# Patient Record
Sex: FEMALE | Race: WHITE | NOT HISPANIC OR LATINO | Employment: FULL TIME | URBAN - METROPOLITAN AREA
[De-identification: names, ages, dates, MRNs, and addresses within clinical notes are randomized per-mention and may not be internally consistent; named-entity substitution may affect disease eponyms.]

---

## 2017-07-03 ENCOUNTER — LAB VISIT (OUTPATIENT)
Dept: LAB | Facility: HOSPITAL | Age: 22
End: 2017-07-03
Payer: COMMERCIAL

## 2017-07-03 ENCOUNTER — OFFICE VISIT (OUTPATIENT)
Dept: INTERNAL MEDICINE | Facility: CLINIC | Age: 22
End: 2017-07-03
Payer: COMMERCIAL

## 2017-07-03 VITALS
SYSTOLIC BLOOD PRESSURE: 101 MMHG | WEIGHT: 144.81 LBS | BODY MASS INDEX: 20.73 KG/M2 | HEART RATE: 74 BPM | HEIGHT: 70 IN | DIASTOLIC BLOOD PRESSURE: 82 MMHG

## 2017-07-03 DIAGNOSIS — M25.541 ARTHRALGIA OF BOTH HANDS: ICD-10-CM

## 2017-07-03 DIAGNOSIS — J45.20 MILD INTERMITTENT ASTHMA WITHOUT COMPLICATION: Primary | ICD-10-CM

## 2017-07-03 DIAGNOSIS — Z00.00 ANNUAL PHYSICAL EXAM: ICD-10-CM

## 2017-07-03 DIAGNOSIS — M25.542 ARTHRALGIA OF BOTH HANDS: ICD-10-CM

## 2017-07-03 DIAGNOSIS — R53.82 CHRONIC FATIGUE: ICD-10-CM

## 2017-07-03 DIAGNOSIS — R81 GLUCOSURIA: ICD-10-CM

## 2017-07-03 LAB
ANION GAP SERPL CALC-SCNC: 7 MMOL/L
BACTERIA #/AREA URNS AUTO: NORMAL /HPF
BASOPHILS # BLD AUTO: 0.02 K/UL
BASOPHILS NFR BLD: 0.3 %
BILIRUB UR QL STRIP: NEGATIVE
BUN SERPL-MCNC: 14 MG/DL
CALCIUM SERPL-MCNC: 9.1 MG/DL
CHLORIDE SERPL-SCNC: 104 MMOL/L
CLARITY UR REFRACT.AUTO: CLEAR
CO2 SERPL-SCNC: 28 MMOL/L
COLOR UR AUTO: YELLOW
CREAT SERPL-MCNC: 0.9 MG/DL
DIFFERENTIAL METHOD: ABNORMAL
EOSINOPHIL # BLD AUTO: 0.1 K/UL
EOSINOPHIL NFR BLD: 1.4 %
ERYTHROCYTE [DISTWIDTH] IN BLOOD BY AUTOMATED COUNT: 12.5 %
EST. GFR  (AFRICAN AMERICAN): >60 ML/MIN/1.73 M^2
EST. GFR  (NON AFRICAN AMERICAN): >60 ML/MIN/1.73 M^2
GLUCOSE SERPL-MCNC: 77 MG/DL (ref 70–110)
GLUCOSE SERPL-MCNC: 82 MG/DL
GLUCOSE UR QL STRIP: NEGATIVE
HCT VFR BLD AUTO: 40.6 %
HGB BLD-MCNC: 13.9 G/DL
HGB UR QL STRIP: NEGATIVE
KETONES UR QL STRIP: NEGATIVE
LEUKOCYTE ESTERASE UR QL STRIP: NEGATIVE
LYMPHOCYTES # BLD AUTO: 2.3 K/UL
LYMPHOCYTES NFR BLD: 35.9 %
MCH RBC QN AUTO: 31 PG
MCHC RBC AUTO-ENTMCNC: 34.2 %
MCV RBC AUTO: 90 FL
MICROSCOPIC COMMENT: NORMAL
MONOCYTES # BLD AUTO: 0.6 K/UL
MONOCYTES NFR BLD: 8.8 %
NEUTROPHILS # BLD AUTO: 3.3 K/UL
NEUTROPHILS NFR BLD: 53.4 %
NITRITE UR QL STRIP: NEGATIVE
PH UR STRIP: 6 [PH] (ref 5–8)
PLATELET # BLD AUTO: 212 K/UL
PMV BLD AUTO: 9.1 FL
POTASSIUM SERPL-SCNC: 4.1 MMOL/L
PROT UR QL STRIP: NEGATIVE
RBC # BLD AUTO: 4.49 M/UL
RBC #/AREA URNS AUTO: 0 /HPF (ref 0–4)
SODIUM SERPL-SCNC: 139 MMOL/L
SP GR UR STRIP: 1.02 (ref 1–1.03)
SQUAMOUS #/AREA URNS AUTO: 1 /HPF
TSH SERPL DL<=0.005 MIU/L-ACNC: 0.81 UIU/ML
URN SPEC COLLECT METH UR: NORMAL
UROBILINOGEN UR STRIP-ACNC: NEGATIVE EU/DL
WBC # BLD AUTO: 6.26 K/UL
WBC #/AREA URNS AUTO: 1 /HPF (ref 0–5)

## 2017-07-03 PROCEDURE — 86592 SYPHILIS TEST NON-TREP QUAL: CPT

## 2017-07-03 PROCEDURE — 86703 HIV-1/HIV-2 1 RESULT ANTBDY: CPT

## 2017-07-03 PROCEDURE — 99999 PR PBB SHADOW E&M-NEW PATIENT-LVL III: CPT | Mod: PBBFAC,,, | Performed by: HOSPITALIST

## 2017-07-03 PROCEDURE — 36415 COLL VENOUS BLD VENIPUNCTURE: CPT

## 2017-07-03 PROCEDURE — 86038 ANTINUCLEAR ANTIBODIES: CPT

## 2017-07-03 PROCEDURE — 86431 RHEUMATOID FACTOR QUANT: CPT

## 2017-07-03 PROCEDURE — 99203 OFFICE O/P NEW LOW 30 MIN: CPT | Mod: S$GLB,,, | Performed by: HOSPITALIST

## 2017-07-03 PROCEDURE — 85025 COMPLETE CBC W/AUTO DIFF WBC: CPT

## 2017-07-03 PROCEDURE — 80048 BASIC METABOLIC PNL TOTAL CA: CPT

## 2017-07-03 PROCEDURE — 82948 REAGENT STRIP/BLOOD GLUCOSE: CPT | Mod: S$GLB,,, | Performed by: HOSPITALIST

## 2017-07-03 PROCEDURE — 84443 ASSAY THYROID STIM HORMONE: CPT

## 2017-07-03 RX ORDER — FLUTICASONE PROPIONATE 110 UG/1
1 AEROSOL, METERED RESPIRATORY (INHALATION) 2 TIMES DAILY
Qty: 12 G | Refills: 0 | Status: CANCELLED | OUTPATIENT
Start: 2017-07-03 | End: 2018-07-03

## 2017-07-03 RX ORDER — DESOGESTREL AND ETHINYL ESTRADIOL 0.15-0.03
0.15 KIT ORAL DAILY
Refills: 0 | COMMUNITY
Start: 2017-06-08 | End: 2022-10-18

## 2017-07-03 RX ORDER — AMOXICILLIN AND CLAVULANATE POTASSIUM 500; 125 MG/1; MG/1
1 TABLET, FILM COATED ORAL
COMMUNITY
End: 2022-10-18

## 2017-07-03 RX ORDER — ALBUTEROL SULFATE 90 UG/1
2 AEROSOL, METERED RESPIRATORY (INHALATION) EVERY 6 HOURS PRN
COMMUNITY
End: 2023-10-31

## 2017-07-03 RX ORDER — ESCITALOPRAM OXALATE 10 MG/1
10 TABLET ORAL DAILY
Refills: 0 | COMMUNITY
Start: 2017-06-08 | End: 2022-10-18

## 2017-07-03 NOTE — PROGRESS NOTES
Subjective:       Patient ID: Valerie Cole is a 21 y.o. female.    Chief Complaint: Follow-up    21 y.o F with hx of asthma and kidney stones presents for further evaluation of glucosuria. patient was seen at urgent care 2 days ago for dysuria as well as rhinorrhea for which she was placed on Augmentin. She was told that her UA showed glucose. She is now day 2/7 of Augmentin therapy and her symptoms are improving. Regarding her glucosuria, patient has no personal or fam hx of DM, however, she was found to have elevated blood glucose in the past during an ER visit. She has polydipsia but no weight loss.     Today, patient also notes chronic fatigue that she has been having for 2-3 years. No fevers or chills. Denies any hx of thyroid issues or problems with her menses. In addition, patient notes 6 mo hx of intermittent arthralgias in her PIP and bilat wrist joints as well as knees with intermittent swelling. She is not currently having symptoms. No fam hx of SLE or RA. Pt's asthma was diagnosed 3-2 years ago by a pulmonary doctor, she was on a daily inhaled corticosteroid but stopped taking it. She has an albuterol inhaler and has not been having to use it frequently (approx once monthly at this time). Takes Claritin intermittently. No prior hx of severe exacerbations, especially ones requiring intubation     Obesity screen: BMI 20  STD Screening: will screen today  Depression Screening: on lexapro  Cervical Cancer: no prior abnormal PAP smears  Vaccines:HPV - completed           Review of Systems   Constitutional: Positive for fatigue. Negative for activity change, appetite change, chills, fever and unexpected weight change.   HENT: Negative for congestion and sore throat.    Eyes: Negative for visual disturbance.   Respiratory: Negative for cough, shortness of breath and wheezing.    Cardiovascular: Negative for chest pain, palpitations and leg swelling.   Gastrointestinal: Negative for abdominal distention and  "abdominal pain.   Genitourinary: Negative for dysuria.   Musculoskeletal: Positive for arthralgias. Negative for joint swelling and myalgias.   Neurological: Negative for dizziness, weakness and light-headedness.   Psychiatric/Behavioral: Negative for confusion.       Objective:     /82   Pulse 74   Ht 5' 10" (1.778 m)   Wt 65.7 kg (144 lb 13.5 oz)   LMP 06/28/2017 (Approximate)   BMI 20.78 kg/m²     Physical Exam   Constitutional: She is oriented to person, place, and time. She appears well-developed and well-nourished. No distress.   HENT:   Head: Normocephalic and atraumatic.   Eyes: EOM are normal. Pupils are equal, round, and reactive to light.   Neck: Normal range of motion. Neck supple. No thyromegaly present.   Cardiovascular: Normal rate, regular rhythm and normal heart sounds.  Exam reveals no friction rub.    No murmur heard.  Pulmonary/Chest: Effort normal and breath sounds normal. No respiratory distress. She has no wheezes. She has no rales.   Abdominal: Soft. Bowel sounds are normal. She exhibits no distension. There is no tenderness.   Musculoskeletal: Normal range of motion. She exhibits no edema, tenderness or deformity.   Lymphadenopathy:     She has no cervical adenopathy.   Neurological: She is alert and oriented to person, place, and time. No cranial nerve deficit.   Skin: Skin is warm and dry. No rash noted. She is not diaphoretic. No pallor.   Psychiatric: She has a normal mood and affect.       Assessment:       1. Mild intermittent asthma without complication    2. Arthralgia of both hands    3. Glucosuria    4. Chronic fatigue    5. Annual physical exam        Plan:       1. Mild intermittent asthma without complication  - cont PRN albuterol inhaler at this time  - advised patient to return to clinic if she has an exacerbation     2. Arthralgia of both hands  - rule out inflammatory dz such as SLE or RA  - FIONA; Future  - Rheumatoid factor; Future    3. Glucosuria  - POCT Glucose " -77, so unlikely to be DM  - URINALYSIS  - Basic metabolic panel; Future    4. Chronic fatigue  - Basic metabolic panel; Future  - TSH; Future    5. Annual physical exam  - CBC auto differential; Future  - HIV-1 and HIV-2 antibodies; Future  - RPR; Future  - C. trachomatis/N. gonorrhoeae by AMP DNA Urine    RTC in 6 mo    Discussed with Dr Escobar Dorsey MD  Internal Medicine PGY-3  Pager [phone]

## 2017-07-04 LAB
C TRACH DNA SPEC QL NAA+PROBE: NOT DETECTED
N GONORRHOEA DNA SPEC QL NAA+PROBE: NOT DETECTED

## 2017-07-05 LAB
ANA SER QL IF: NORMAL
HIV 1+2 AB+HIV1 P24 AG SERPL QL IA: NEGATIVE
RHEUMATOID FACT SERPL-ACNC: <10 IU/ML
RPR SER QL: NORMAL

## 2017-07-06 ENCOUNTER — TELEPHONE (OUTPATIENT)
Dept: INTERNAL MEDICINE | Facility: CLINIC | Age: 22
End: 2017-07-06

## 2017-07-06 NOTE — TELEPHONE ENCOUNTER
Called pt about lab work. All unremarkable. No hyperglycemia or glucosuria.     Elzbieta Dorsey MD  Internal Medicine PGY-3  Pager [phone]

## 2020-04-15 DIAGNOSIS — R06.09 OTHER FORMS OF DYSPNEA: Primary | ICD-10-CM

## 2020-07-07 DIAGNOSIS — R06.09 OTHER FORMS OF DYSPNEA: Primary | ICD-10-CM

## 2021-04-26 ENCOUNTER — PATIENT MESSAGE (OUTPATIENT)
Dept: RESEARCH | Facility: HOSPITAL | Age: 26
End: 2021-04-26

## 2022-10-12 DIAGNOSIS — Z11.59 NEED FOR HEPATITIS C SCREENING TEST: ICD-10-CM

## 2022-10-18 ENCOUNTER — OFFICE VISIT (OUTPATIENT)
Dept: ALLERGY | Facility: CLINIC | Age: 27
End: 2022-10-18
Payer: MEDICAID

## 2022-10-18 ENCOUNTER — LAB VISIT (OUTPATIENT)
Dept: LAB | Facility: HOSPITAL | Age: 27
End: 2022-10-18
Payer: MEDICAID

## 2022-10-18 VITALS — BODY MASS INDEX: 20.64 KG/M2 | WEIGHT: 144.19 LBS | HEIGHT: 70 IN

## 2022-10-18 DIAGNOSIS — J45.20 MILD INTERMITTENT ASTHMA WITHOUT COMPLICATION: ICD-10-CM

## 2022-10-18 DIAGNOSIS — J31.0 CHRONIC RHINITIS: Primary | ICD-10-CM

## 2022-10-18 DIAGNOSIS — J31.0 CHRONIC RHINITIS: ICD-10-CM

## 2022-10-18 DIAGNOSIS — J32.9 RECURRENT SINUS INFECTIONS: ICD-10-CM

## 2022-10-18 LAB
ALBUMIN SERPL BCP-MCNC: 4.4 G/DL (ref 3.5–5.2)
ALP SERPL-CCNC: 51 U/L (ref 55–135)
ALT SERPL W/O P-5'-P-CCNC: 13 U/L (ref 10–44)
ANION GAP SERPL CALC-SCNC: 6 MMOL/L (ref 8–16)
AST SERPL-CCNC: 19 U/L (ref 10–40)
BASOPHILS # BLD AUTO: 0.02 K/UL (ref 0–0.2)
BASOPHILS NFR BLD: 0.5 % (ref 0–1.9)
BILIRUB SERPL-MCNC: 0.9 MG/DL (ref 0.1–1)
BUN SERPL-MCNC: 15 MG/DL (ref 6–20)
CALCIUM SERPL-MCNC: 9.2 MG/DL (ref 8.7–10.5)
CHLORIDE SERPL-SCNC: 107 MMOL/L (ref 95–110)
CO2 SERPL-SCNC: 24 MMOL/L (ref 23–29)
CREAT SERPL-MCNC: 0.8 MG/DL (ref 0.5–1.4)
DIFFERENTIAL METHOD: ABNORMAL
EOSINOPHIL # BLD AUTO: 0 K/UL (ref 0–0.5)
EOSINOPHIL NFR BLD: 0.2 % (ref 0–8)
ERYTHROCYTE [DISTWIDTH] IN BLOOD BY AUTOMATED COUNT: 12.2 % (ref 11.5–14.5)
EST. GFR  (NO RACE VARIABLE): >60 ML/MIN/1.73 M^2
GLUCOSE SERPL-MCNC: 86 MG/DL (ref 70–110)
HCT VFR BLD AUTO: 38.7 % (ref 37–48.5)
HGB BLD-MCNC: 12.8 G/DL (ref 12–16)
IGA SERPL-MCNC: 109 MG/DL (ref 40–350)
IGG SERPL-MCNC: 1149 MG/DL (ref 650–1600)
IGM SERPL-MCNC: 204 MG/DL (ref 50–300)
IMM GRANULOCYTES # BLD AUTO: 0.01 K/UL (ref 0–0.04)
IMM GRANULOCYTES NFR BLD AUTO: 0.2 % (ref 0–0.5)
LYMPHOCYTES # BLD AUTO: 1.8 K/UL (ref 1–4.8)
LYMPHOCYTES NFR BLD: 40.5 % (ref 18–48)
MCH RBC QN AUTO: 31.4 PG (ref 27–31)
MCHC RBC AUTO-ENTMCNC: 33.1 G/DL (ref 32–36)
MCV RBC AUTO: 95 FL (ref 82–98)
MONOCYTES # BLD AUTO: 0.3 K/UL (ref 0.3–1)
MONOCYTES NFR BLD: 7.2 % (ref 4–15)
NEUTROPHILS # BLD AUTO: 2.3 K/UL (ref 1.8–7.7)
NEUTROPHILS NFR BLD: 51.4 % (ref 38–73)
NRBC BLD-RTO: 0 /100 WBC
PLATELET # BLD AUTO: 198 K/UL (ref 150–450)
PMV BLD AUTO: 9.9 FL (ref 9.2–12.9)
POTASSIUM SERPL-SCNC: 3.6 MMOL/L (ref 3.5–5.1)
PROT SERPL-MCNC: 7.4 G/DL (ref 6–8.4)
RBC # BLD AUTO: 4.08 M/UL (ref 4–5.4)
SODIUM SERPL-SCNC: 137 MMOL/L (ref 136–145)
WBC # BLD AUTO: 4.44 K/UL (ref 3.9–12.7)

## 2022-10-18 PROCEDURE — 99999 PR PBB SHADOW E&M-EST. PATIENT-LVL III: CPT | Mod: PBBFAC,,, | Performed by: ALLERGY & IMMUNOLOGY

## 2022-10-18 PROCEDURE — 86774 TETANUS ANTIBODY: CPT | Performed by: ALLERGY & IMMUNOLOGY

## 2022-10-18 PROCEDURE — 99213 OFFICE O/P EST LOW 20 MIN: CPT | Mod: 25,PBBFAC,PO | Performed by: ALLERGY & IMMUNOLOGY

## 2022-10-18 PROCEDURE — 1159F MED LIST DOCD IN RCRD: CPT | Mod: CPTII,,, | Performed by: ALLERGY & IMMUNOLOGY

## 2022-10-18 PROCEDURE — 36415 COLL VENOUS BLD VENIPUNCTURE: CPT | Mod: PO | Performed by: ALLERGY & IMMUNOLOGY

## 2022-10-18 PROCEDURE — 85025 COMPLETE CBC W/AUTO DIFF WBC: CPT | Performed by: ALLERGY & IMMUNOLOGY

## 2022-10-18 PROCEDURE — 80053 COMPREHEN METABOLIC PANEL: CPT | Performed by: ALLERGY & IMMUNOLOGY

## 2022-10-18 PROCEDURE — 1160F RVW MEDS BY RX/DR IN RCRD: CPT | Mod: CPTII,,, | Performed by: ALLERGY & IMMUNOLOGY

## 2022-10-18 PROCEDURE — 99204 OFFICE O/P NEW MOD 45 MIN: CPT | Mod: S$PBB,,, | Performed by: ALLERGY & IMMUNOLOGY

## 2022-10-18 PROCEDURE — 86003 ALLG SPEC IGE CRUDE XTRC EA: CPT | Mod: 59 | Performed by: ALLERGY & IMMUNOLOGY

## 2022-10-18 PROCEDURE — 86003 ALLG SPEC IGE CRUDE XTRC EA: CPT | Performed by: ALLERGY & IMMUNOLOGY

## 2022-10-18 PROCEDURE — 82784 ASSAY IGA/IGD/IGG/IGM EACH: CPT | Performed by: ALLERGY & IMMUNOLOGY

## 2022-10-18 PROCEDURE — 86317 IMMUNOASSAY INFECTIOUS AGENT: CPT | Performed by: ALLERGY & IMMUNOLOGY

## 2022-10-18 PROCEDURE — 1160F PR REVIEW ALL MEDS BY PRESCRIBER/CLIN PHARMACIST DOCUMENTED: ICD-10-PCS | Mod: CPTII,,, | Performed by: ALLERGY & IMMUNOLOGY

## 2022-10-18 PROCEDURE — 82787 IGG 1 2 3 OR 4 EACH: CPT | Performed by: ALLERGY & IMMUNOLOGY

## 2022-10-18 PROCEDURE — 99204 PR OFFICE/OUTPT VISIT, NEW, LEVL IV, 45-59 MIN: ICD-10-PCS | Mod: S$PBB,,, | Performed by: ALLERGY & IMMUNOLOGY

## 2022-10-18 PROCEDURE — 82784 ASSAY IGA/IGD/IGG/IGM EACH: CPT | Mod: 59 | Performed by: ALLERGY & IMMUNOLOGY

## 2022-10-18 PROCEDURE — 1159F PR MEDICATION LIST DOCUMENTED IN MEDICAL RECORD: ICD-10-PCS | Mod: CPTII,,, | Performed by: ALLERGY & IMMUNOLOGY

## 2022-10-18 PROCEDURE — 82785 ASSAY OF IGE: CPT | Performed by: ALLERGY & IMMUNOLOGY

## 2022-10-18 PROCEDURE — 99999 PR PBB SHADOW E&M-EST. PATIENT-LVL III: ICD-10-PCS | Mod: PBBFAC,,, | Performed by: ALLERGY & IMMUNOLOGY

## 2022-10-18 NOTE — PROGRESS NOTES
Subjective:       Patient ID: Valerie Cole is a 27 y.o. female.    Chief Complaint:  Other      28 yo woman presents for new patient evaluation of recurrent sinus issues. She states eery 1-2 months has congestion, sore throat ,lots of mucus both PND and runny nose. May have chest tightness but no SOB. Some times is treated with antibiotics often resolves on own, lasts 3-7 days. Has off and on all year, no season worse. No triggers. Never had allergy test. Has H/O exercise induced asthma. Has albuterol but not used in about 6 months. Never had pneumonia just sinus issues. She does have lots of fatigue. At times feels like will pass out but doesn't. She has dry skin with rash on face at cheeks and forehead. Has appt with derm. Has ulcers in motuh- gums, tongue. Has this since childhood. Has joint stiffness. Has GI issues with burning and discomfort after eating. She did dairy free and gluten free and this is better. No food with hives hives or SOB. No insect or latex allergy. Has been told has osteopenia. No other medical issues.       Environmental History: see history section for home environment  Review of Systems   Constitutional:  Positive for fatigue. Negative for appetite change, chills and fever.   HENT:  Positive for congestion, postnasal drip, rhinorrhea, sinus pressure and sore throat. Negative for ear discharge, ear pain, facial swelling, nosebleeds, sneezing, trouble swallowing and voice change.    Eyes:  Negative for discharge, redness, itching and visual disturbance.   Respiratory:  Positive for cough and chest tightness. Negative for choking, shortness of breath and wheezing.    Cardiovascular:  Negative for chest pain, palpitations and leg swelling.   Gastrointestinal:  Positive for abdominal pain and nausea. Negative for abdominal distention, constipation, diarrhea and vomiting.   Genitourinary:  Negative for difficulty urinating.   Musculoskeletal:  Positive for arthralgias and myalgias.  Negative for gait problem and joint swelling.   Skin:  Positive for color change and rash.   Neurological:  Positive for syncope and headaches. Negative for dizziness, weakness and light-headedness.   Hematological:  Negative for adenopathy. Does not bruise/bleed easily.   Psychiatric/Behavioral:  Negative for agitation, behavioral problems, confusion and sleep disturbance. The patient is not nervous/anxious.       Objective:      Physical Exam  Vitals and nursing note reviewed.   Constitutional:       General: She is not in acute distress.     Appearance: Normal appearance. She is not ill-appearing.   HENT:      Head: Normocephalic and atraumatic.      Right Ear: External ear normal.      Left Ear: External ear normal.      Nose: No rhinorrhea.   Eyes:      General:         Right eye: No discharge.         Left eye: No discharge.      Conjunctiva/sclera: Conjunctivae normal.   Cardiovascular:      Rate and Rhythm: Normal rate and regular rhythm.   Pulmonary:      Effort: Pulmonary effort is normal. No respiratory distress.   Abdominal:      General: There is no distension.   Musculoskeletal:         General: Normal range of motion.   Skin:     General: Skin is warm and dry.      Findings: No erythema or rash.   Neurological:      Mental Status: She is alert and oriented to person, place, and time.   Psychiatric:         Mood and Affect: Mood normal.         Behavior: Behavior normal.         Thought Content: Thought content normal.         Judgment: Judgment normal.       Laboratory:   none performed   Assessment:       1. Chronic rhinitis    2. Recurrent sinus infections    3. Mild intermittent asthma without complication         Plan:       Advised pt recurrent sinus issues can be recurrent infections vs allergic rhinitis vs chronic non allergic rhinitis, will send labs for immunocaps and immune system  Advised ulcers, joint stiffness and rash are not symptoms of IgE mediated allergy and needs to see PCP vs  possibly rheumatology  Phone review

## 2022-10-19 LAB — IGE SERPL-ACNC: <35 IU/ML (ref 0–100)

## 2022-10-20 ENCOUNTER — OFFICE VISIT (OUTPATIENT)
Dept: DERMATOLOGY | Facility: CLINIC | Age: 27
End: 2022-10-20
Payer: MEDICAID

## 2022-10-20 ENCOUNTER — TELEPHONE (OUTPATIENT)
Dept: ALLERGY | Facility: CLINIC | Age: 27
End: 2022-10-20
Payer: MEDICAID

## 2022-10-20 DIAGNOSIS — D22.9 BENIGN NEVUS: ICD-10-CM

## 2022-10-20 DIAGNOSIS — L85.3 XEROSIS CUTIS: ICD-10-CM

## 2022-10-20 DIAGNOSIS — L91.8 SKIN TAG: ICD-10-CM

## 2022-10-20 DIAGNOSIS — L30.9 FACIAL ECZEMA: Primary | ICD-10-CM

## 2022-10-20 DIAGNOSIS — L70.0 ACNE VULGARIS: ICD-10-CM

## 2022-10-20 PROCEDURE — 99203 OFFICE O/P NEW LOW 30 MIN: CPT | Mod: S$PBB,,, | Performed by: STUDENT IN AN ORGANIZED HEALTH CARE EDUCATION/TRAINING PROGRAM

## 2022-10-20 PROCEDURE — 99999 PR PBB SHADOW E&M-EST. PATIENT-LVL II: CPT | Mod: PBBFAC,,, | Performed by: STUDENT IN AN ORGANIZED HEALTH CARE EDUCATION/TRAINING PROGRAM

## 2022-10-20 PROCEDURE — 99212 OFFICE O/P EST SF 10 MIN: CPT | Mod: PBBFAC,PO | Performed by: STUDENT IN AN ORGANIZED HEALTH CARE EDUCATION/TRAINING PROGRAM

## 2022-10-20 PROCEDURE — 1159F MED LIST DOCD IN RCRD: CPT | Mod: CPTII,,, | Performed by: STUDENT IN AN ORGANIZED HEALTH CARE EDUCATION/TRAINING PROGRAM

## 2022-10-20 PROCEDURE — 1160F PR REVIEW ALL MEDS BY PRESCRIBER/CLIN PHARMACIST DOCUMENTED: ICD-10-PCS | Mod: CPTII,,, | Performed by: STUDENT IN AN ORGANIZED HEALTH CARE EDUCATION/TRAINING PROGRAM

## 2022-10-20 PROCEDURE — 1160F RVW MEDS BY RX/DR IN RCRD: CPT | Mod: CPTII,,, | Performed by: STUDENT IN AN ORGANIZED HEALTH CARE EDUCATION/TRAINING PROGRAM

## 2022-10-20 PROCEDURE — 1159F PR MEDICATION LIST DOCUMENTED IN MEDICAL RECORD: ICD-10-PCS | Mod: CPTII,,, | Performed by: STUDENT IN AN ORGANIZED HEALTH CARE EDUCATION/TRAINING PROGRAM

## 2022-10-20 PROCEDURE — 99999 PR PBB SHADOW E&M-EST. PATIENT-LVL II: ICD-10-PCS | Mod: PBBFAC,,, | Performed by: STUDENT IN AN ORGANIZED HEALTH CARE EDUCATION/TRAINING PROGRAM

## 2022-10-20 PROCEDURE — 99203 PR OFFICE/OUTPT VISIT, NEW, LEVL III, 30-44 MIN: ICD-10-PCS | Mod: S$PBB,,, | Performed by: STUDENT IN AN ORGANIZED HEALTH CARE EDUCATION/TRAINING PROGRAM

## 2022-10-20 RX ORDER — PIMECROLIMUS 10 MG/G
CREAM TOPICAL 2 TIMES DAILY
Qty: 60 G | Refills: 1 | Status: SHIPPED | OUTPATIENT
Start: 2022-10-20 | End: 2023-10-31

## 2022-10-20 NOTE — TELEPHONE ENCOUNTER
Spoke to Dr. Worthington re: pt needing referral to Rheumatology. Returned call to pt to let her know that referral needs to come from her PCP for insurance purposes. Pt verbalized understanding.

## 2022-10-20 NOTE — PROGRESS NOTES
Subjective:       Patient ID:  Valerie Cole is a 27 y.o. female who presents for   Chief Complaint   Patient presents with    Dry Skin     face     New patient     Patient here today for dry skin on face x 10 years. States she has very sensitive skin. Stopped using scented products. Getting more breakouts than usual. Uses natural honey and goat soap. Does not use moisturizer.     C/o spot on right lower chest x years. Has gotten bigger.     Review of Systems   Constitutional:  Negative for fever, chills and fatigue.   Respiratory:  Negative for cough and shortness of breath.    Gastrointestinal:  Negative for nausea and vomiting.   Skin:  Positive for dry skin. Negative for daily sunscreen use.      Objective:    Physical Exam   Constitutional: She appears well-developed and well-nourished.   Neurological: She is alert and oriented to person, place, and time.   Psychiatric: She has a normal mood and affect.   Skin:   Areas Examined (abnormalities noted in diagram):   Head / Face Inspection Performed  Chest / Axilla Inspection Performed  LUE Inspection Performed                 Diagram Legend     Erythematous scaling macule/papule c/w actinic keratosis       Vascular papule c/w angioma      Pigmented verrucoid papule/plaque c/w seborrheic keratosis      Yellow umbilicated papule c/w sebaceous hyperplasia      Irregularly shaped tan macule c/w lentigo     1-2 mm smooth white papules consistent with Milia      Movable subcutaneous cyst with punctum c/w epidermal inclusion cyst      Subcutaneous movable cyst c/w pilar cyst      Firm pink to brown papule c/w dermatofibroma      Pedunculated fleshy papule(s) c/w skin tag(s)      Evenly pigmented macule c/w junctional nevus     Mildly variegated pigmented, slightly irregular-bordered macule c/w mildly atypical nevus      Flesh colored to evenly pigmented papule c/w intradermal nevus       Pink pearly papule/plaque c/w basal cell carcinoma      Erythematous  hyperkeratotic cursted plaque c/w SCC      Surgical scar with no sign of skin cancer recurrence      Open and closed comedones      Inflammatory papules and pustules      Verrucoid papule consistent consistent with wart     Erythematous eczematous patches and plaques     Dystrophic onycholytic nail with subungual debris c/w onychomycosis     Umbilicated papule    Erythematous-base heme-crusted tan verrucoid plaque consistent with inflamed seborrheic keratosis     Erythematous Silvery Scaling Plaque c/w Psoriasis     See annotation        Assessment / Plan:        Facial eczema  Xerosis cutis  -     pimecrolimus (ELIDEL) 1 % cream; Apply topically 2 (two) times daily.  Dispense: 60 g; Refill: 1  - recommend vanicream moisturizing cream or lite lotion  - start 1 cream BID x 2 weeks, then add second cream in case of reaction     Acne vulgaris  - not concerning her today, lower face most prominently involved, would start OCP if she desires treatment in the future    Skin tag  - discussed that lesions are benign, non-cancerous. Removal is cosmetic.     Benign nevus  - abdomen, measurement taken, no concerning features, will monitor    Possible colon's nevus on left upper arm/shoulder         No follow-ups on file.

## 2022-10-20 NOTE — TELEPHONE ENCOUNTER
----- Message from Audrey Montelongo sent at 10/20/2022 11:09 AM CDT -----  .Type:  Patient Call Back    Who Called: PT       Does the patient know what this is regarding?: PT CALLED TO SCHEDULE AN APPOINTMENT IN RHEUMATOLOGY JEREMIAH ULLOA IS ACCEPTING NP AT THIS TIME SO PLEASE ADD HER ON THE REFERRAL     Would the patient rather a call back YES     Best Call Back Number: [phone]    Additional Information: Thank You

## 2022-10-21 LAB
A ALTERNATA IGE QN: <0.1 KU/L
A FUMIGATUS IGE QN: <0.1 KU/L
ALLERGEN CHAETOMIUM GLOBOSUM IGE: <0.1 KU/L
BAHIA GRASS IGE QN: <0.1 KU/L
BALD CYPRESS IGE QN: <0.1 KU/L
BERMUDA GRASS IGE QN: <0.1 KU/L
C HERBARUM IGE QN: <0.1 KU/L
C LUNATA IGE QN: <0.1 KU/L
CAT DANDER IGE QN: <0.1 KU/L
CHAETOMIUM GLOB. CLASS: NORMAL
COMMON RAGWEED IGE QN: <0.1 KU/L
COTTONWOOD IGE QN: <0.1 KU/L
D FARINAE IGE QN: <0.1 KU/L
D PTERONYSS IGE QN: <0.1 KU/L
DEPRECATED A ALTERNATA IGE RAST QL: NORMAL
DEPRECATED A FUMIGATUS IGE RAST QL: NORMAL
DEPRECATED BAHIA GRASS IGE RAST QL: NORMAL
DEPRECATED BALD CYPRESS IGE RAST QL: NORMAL
DEPRECATED BERMUDA GRASS IGE RAST QL: NORMAL
DEPRECATED C HERBARUM IGE RAST QL: NORMAL
DEPRECATED C LUNATA IGE RAST QL: NORMAL
DEPRECATED CAT DANDER IGE RAST QL: NORMAL
DEPRECATED COMMON RAGWEED IGE RAST QL: NORMAL
DEPRECATED COTTONWOOD IGE RAST QL: NORMAL
DEPRECATED D FARINAE IGE RAST QL: NORMAL
DEPRECATED D PTERONYSS IGE RAST QL: NORMAL
DEPRECATED DOG DANDER IGE RAST QL: NORMAL
DEPRECATED ELDER IGE RAST QL: NORMAL
DEPRECATED ENGL PLANTAIN IGE RAST QL: NORMAL
DEPRECATED JOHNSON GRASS IGE RAST QL: NORMAL
DEPRECATED LONDON PLANE IGE RAST QL: NORMAL
DEPRECATED MUGWORT IGE RAST QL: NORMAL
DEPRECATED P NOTATUM IGE RAST QL: NORMAL
DEPRECATED PECAN/HICK TREE IGE RAST QL: NORMAL
DEPRECATED ROACH IGE RAST QL: NORMAL
DEPRECATED S ROSTRATA IGE RAST QL: NORMAL
DEPRECATED SALTWORT IGE RAST QL: NORMAL
DEPRECATED SILVER BIRCH IGE RAST QL: NORMAL
DEPRECATED TIMOTHY IGE RAST QL: NORMAL
DEPRECATED WHITE OAK IGE RAST QL: NORMAL
DEPRECATED WILLOW IGE RAST QL: NORMAL
DOG DANDER IGE QN: <0.1 KU/L
ELDER IGE QN: <0.1 KU/L
ENGL PLANTAIN IGE QN: <0.1 KU/L
IGG1 SER-MCNC: 610 MG/DL (ref 382–929)
IGG2 SER-MCNC: 310 MG/DL (ref 242–700)
IGG3 SER-MCNC: 66 MG/DL (ref 22–176)
IGG4 SER-MCNC: 1 MG/DL (ref 4–86)
JOHNSON GRASS IGE QN: <0.1 KU/L
LONDON PLANE IGE QN: <0.1 KU/L
MUGWORT IGE QN: <0.1 KU/L
P NOTATUM IGE QN: <0.1 KU/L
PECAN/HICK TREE IGE QN: <0.1 KU/L
ROACH IGE QN: <0.1 KU/L
S ROSTRATA IGE QN: <0.1 KU/L
SALTWORT IGE QN: <0.1 KU/L
SILVER BIRCH IGE QN: <0.1 KU/L
TIMOTHY IGE QN: <0.1 KU/L
WHITE OAK IGE QN: <0.1 KU/L
WILLOW IGE QN: <0.1 KU/L

## 2022-10-26 ENCOUNTER — TELEPHONE (OUTPATIENT)
Dept: FAMILY MEDICINE | Facility: CLINIC | Age: 27
End: 2022-10-26
Payer: MEDICAID

## 2022-10-26 LAB
C DIPHTHERIAE AB SER IA-ACNC: 1.1 IU/ML
C TETANI TOXOID AB SER-ACNC: 3.29 IU/ML
DEPRECATED S PNEUM23 IGG SER-MCNC: <0.3 UG/ML
DEPRECATED S PNEUM4 IGG SER-MCNC: <0.3 UG/ML
HAEM INFLU B IGG SER IA-MCNC: 0.16 MCG/ML
S PN DA SERO 19F IGG SER-MCNC: 1.4 UG/ML
S PNEUM DA 1 IGG SER-MCNC: 1 UG/ML
S PNEUM DA 14 IGG SER-MCNC: <0.3
S PNEUM DA 18C IGG SER-MCNC: 1.3
S PNEUM DA 19A IGG SER-MCNC: 1.3 UG/ML
S PNEUM DA 3 IGG SER-MCNC: <0.3 UG/ML
S PNEUM DA 5 IGG SER-MCNC: <0.3 UG/ML
S PNEUM DA 6A IGG SER-MCNC: <0.3 UG/ML
S PNEUM DA 6B IGG SER-MCNC: 0.3 UG/ML
S PNEUM DA 7F IGG SER-MCNC: 3 UG/ML
S PNEUM DA 9V IGG SER-MCNC: <0.3 UG/ML

## 2022-10-26 NOTE — TELEPHONE ENCOUNTER
----- Message from Radha Saavedra sent at 10/26/2022 11:53 AM CDT -----  Contact: [phone]  Type: Needs Medical Advice  Who Called:  Pt     Best Call Back Number: [phone]    Additional Information: Pt is requesting a NP appt. Pt has medicaid unable to schedule. Pls call back and advise

## 2022-10-27 ENCOUNTER — PATIENT MESSAGE (OUTPATIENT)
Dept: ALLERGY | Facility: CLINIC | Age: 27
End: 2022-10-27
Payer: MEDICAID

## 2022-10-28 ENCOUNTER — OFFICE VISIT (OUTPATIENT)
Dept: FAMILY MEDICINE | Facility: CLINIC | Age: 27
End: 2022-10-28
Payer: MEDICAID

## 2022-10-28 ENCOUNTER — LAB VISIT (OUTPATIENT)
Dept: LAB | Facility: HOSPITAL | Age: 27
End: 2022-10-28
Attending: FAMILY MEDICINE
Payer: MEDICAID

## 2022-10-28 VITALS
WEIGHT: 141.56 LBS | HEIGHT: 70 IN | OXYGEN SATURATION: 98 % | DIASTOLIC BLOOD PRESSURE: 86 MMHG | HEART RATE: 74 BPM | BODY MASS INDEX: 20.27 KG/M2 | SYSTOLIC BLOOD PRESSURE: 108 MMHG

## 2022-10-28 DIAGNOSIS — L21.9 SEBORRHEIC DERMATITIS: ICD-10-CM

## 2022-10-28 DIAGNOSIS — M25.50 POLYARTHRALGIA: ICD-10-CM

## 2022-10-28 DIAGNOSIS — Z11.59 NEED FOR HEPATITIS C SCREENING TEST: ICD-10-CM

## 2022-10-28 DIAGNOSIS — Z23 NEED FOR PNEUMOCOCCAL VACCINATION: ICD-10-CM

## 2022-10-28 DIAGNOSIS — Z00.00 WELL ADULT EXAM: Primary | ICD-10-CM

## 2022-10-28 DIAGNOSIS — Z00.00 WELL ADULT EXAM: ICD-10-CM

## 2022-10-28 LAB
BACTERIA #/AREA URNS HPF: ABNORMAL /HPF
BILIRUB UR QL STRIP: NEGATIVE
CHOLEST SERPL-MCNC: 155 MG/DL (ref 120–199)
CHOLEST/HDLC SERPL: 2.7 {RATIO} (ref 2–5)
CLARITY UR: CLEAR
COLOR UR: YELLOW
CRP SERPL-MCNC: 0.8 MG/L (ref 0–8.2)
ERYTHROCYTE [SEDIMENTATION RATE] IN BLOOD BY PHOTOMETRIC METHOD: <2 MM/HR (ref 0–36)
ESTIMATED AVG GLUCOSE: 91 MG/DL (ref 68–131)
GLUCOSE UR QL STRIP: NEGATIVE
HBA1C MFR BLD: 4.8 % (ref 4–5.6)
HCV AB SERPL QL IA: NORMAL
HDLC SERPL-MCNC: 57 MG/DL (ref 40–75)
HDLC SERPL: 36.8 % (ref 20–50)
HGB UR QL STRIP: ABNORMAL
KETONES UR QL STRIP: NEGATIVE
LDLC SERPL CALC-MCNC: 88.8 MG/DL (ref 63–159)
LEUKOCYTE ESTERASE UR QL STRIP: NEGATIVE
MICROSCOPIC COMMENT: ABNORMAL
NITRITE UR QL STRIP: NEGATIVE
NONHDLC SERPL-MCNC: 98 MG/DL
PH UR STRIP: 6 [PH] (ref 5–8)
PROT UR QL STRIP: NEGATIVE
RBC #/AREA URNS HPF: 2 /HPF (ref 0–4)
RHEUMATOID FACT SERPL-ACNC: <13 IU/ML (ref 0–15)
SP GR UR STRIP: >=1.03 (ref 1–1.03)
SQUAMOUS #/AREA URNS HPF: 3 /HPF
TRIGL SERPL-MCNC: 46 MG/DL (ref 30–150)
TSH SERPL DL<=0.005 MIU/L-ACNC: 1.21 UIU/ML (ref 0.4–4)
URATE SERPL-MCNC: 5.1 MG/DL (ref 2.4–5.7)
URN SPEC COLLECT METH UR: ABNORMAL
WBC #/AREA URNS HPF: 1 /HPF (ref 0–5)

## 2022-10-28 PROCEDURE — 1160F PR REVIEW ALL MEDS BY PRESCRIBER/CLIN PHARMACIST DOCUMENTED: ICD-10-PCS | Mod: CPTII,,, | Performed by: FAMILY MEDICINE

## 2022-10-28 PROCEDURE — 3074F SYST BP LT 130 MM HG: CPT | Mod: CPTII,,, | Performed by: FAMILY MEDICINE

## 2022-10-28 PROCEDURE — 86038 ANTINUCLEAR ANTIBODIES: CPT | Performed by: FAMILY MEDICINE

## 2022-10-28 PROCEDURE — 99385 PREV VISIT NEW AGE 18-39: CPT | Mod: S$PBB,,, | Performed by: FAMILY MEDICINE

## 2022-10-28 PROCEDURE — 83036 HEMOGLOBIN GLYCOSYLATED A1C: CPT | Performed by: FAMILY MEDICINE

## 2022-10-28 PROCEDURE — 99213 OFFICE O/P EST LOW 20 MIN: CPT | Mod: PBBFAC,PO | Performed by: FAMILY MEDICINE

## 2022-10-28 PROCEDURE — 86140 C-REACTIVE PROTEIN: CPT | Performed by: FAMILY MEDICINE

## 2022-10-28 PROCEDURE — 99999 PR PBB SHADOW E&M-EST. PATIENT-LVL III: CPT | Mod: PBBFAC,,, | Performed by: FAMILY MEDICINE

## 2022-10-28 PROCEDURE — 99385 PR PREVENTIVE VISIT,NEW,18-39: ICD-10-PCS | Mod: S$PBB,,, | Performed by: FAMILY MEDICINE

## 2022-10-28 PROCEDURE — 1160F RVW MEDS BY RX/DR IN RCRD: CPT | Mod: CPTII,,, | Performed by: FAMILY MEDICINE

## 2022-10-28 PROCEDURE — 1159F MED LIST DOCD IN RCRD: CPT | Mod: CPTII,,, | Performed by: FAMILY MEDICINE

## 2022-10-28 PROCEDURE — 3079F PR MOST RECENT DIASTOLIC BLOOD PRESSURE 80-89 MM HG: ICD-10-PCS | Mod: CPTII,,, | Performed by: FAMILY MEDICINE

## 2022-10-28 PROCEDURE — 3074F PR MOST RECENT SYSTOLIC BLOOD PRESSURE < 130 MM HG: ICD-10-PCS | Mod: CPTII,,, | Performed by: FAMILY MEDICINE

## 2022-10-28 PROCEDURE — 81000 URINALYSIS NONAUTO W/SCOPE: CPT | Mod: PO | Performed by: FAMILY MEDICINE

## 2022-10-28 PROCEDURE — 86431 RHEUMATOID FACTOR QUANT: CPT | Performed by: FAMILY MEDICINE

## 2022-10-28 PROCEDURE — 3079F DIAST BP 80-89 MM HG: CPT | Mod: CPTII,,, | Performed by: FAMILY MEDICINE

## 2022-10-28 PROCEDURE — 36415 COLL VENOUS BLD VENIPUNCTURE: CPT | Mod: PO | Performed by: FAMILY MEDICINE

## 2022-10-28 PROCEDURE — 84443 ASSAY THYROID STIM HORMONE: CPT | Performed by: FAMILY MEDICINE

## 2022-10-28 PROCEDURE — 80061 LIPID PANEL: CPT | Performed by: FAMILY MEDICINE

## 2022-10-28 PROCEDURE — 1159F PR MEDICATION LIST DOCUMENTED IN MEDICAL RECORD: ICD-10-PCS | Mod: CPTII,,, | Performed by: FAMILY MEDICINE

## 2022-10-28 PROCEDURE — 84550 ASSAY OF BLOOD/URIC ACID: CPT | Performed by: FAMILY MEDICINE

## 2022-10-28 PROCEDURE — 86803 HEPATITIS C AB TEST: CPT | Performed by: FAMILY MEDICINE

## 2022-10-28 PROCEDURE — 99999 PR PBB SHADOW E&M-EST. PATIENT-LVL III: ICD-10-PCS | Mod: PBBFAC,,, | Performed by: FAMILY MEDICINE

## 2022-10-28 PROCEDURE — 85652 RBC SED RATE AUTOMATED: CPT | Performed by: FAMILY MEDICINE

## 2022-10-28 NOTE — PROGRESS NOTES
"Subjective:       Patient ID: Valerie Cole is a 27 y.o. female.    Chief Complaint: Establish Care (Ulcers in mouth /Joint pain  )    Here today to establish care with a new PCP.   She was seeing Dr. Aldana.  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    Patient here today for annual well adult exam.   Tries to eat a healthy diet.  Exercises regularly.    Immunizations: Tdap 2020  Last Lab work: 2022  Colon Ca screening: due at 45  Breast Ca Screening: MMG at 40  Cervical Ca Screening: PAP - due, has appt with Dr. BRYCE Silva.    Review of Systems   Constitutional:  Negative for activity change, appetite change, fatigue and fever.   HENT:          History of recurrent oral ulcers   Respiratory:  Negative for cough, shortness of breath and wheezing.    Cardiovascular:  Negative for chest pain and palpitations.   Gastrointestinal:  Negative for abdominal pain, constipation, diarrhea and nausea.   Musculoskeletal:  Positive for arthralgias (laxity to joints).   Skin:  Positive for rash (red, itchy and flaky on face). Negative for pallor.   Neurological:  Negative for dizziness, syncope, light-headedness and headaches.   Hematological:  Bruises/bleeds easily.     Objective:      Vitals:    10/28/22 1257   BP: 108/86   Pulse: 74   SpO2: 98%   Weight: 64.2 kg (141 lb 8.6 oz)   Height: 5' 10" (1.778 m)      Physical Exam  Constitutional:       General: She is not in acute distress.  Cardiovascular:      Rate and Rhythm: Normal rate and regular rhythm.      Heart sounds: Normal heart sounds. No murmur heard.  Pulmonary:      Effort: Pulmonary effort is normal. No respiratory distress.      Breath sounds: Normal breath sounds. No wheezing, rhonchi or rales.   Skin:     General: Skin is warm and dry.   Neurological:      General: No focal deficit present.      Mental Status: She is alert.   Psychiatric:         Mood and Affect: Mood normal.         Behavior: Behavior normal.         " Thought Content: Thought content normal.          Assessment:       1. Well adult exam    2. Need for pneumococcal vaccination    3. Seborrheic dermatitis    4. Polyarthralgia    5. Need for hepatitis C screening test        Plan:       Well adult exam  -     Hemoglobin A1C; Future; Expected date: 10/28/2022  -     Lipid Panel; Future; Expected date: 10/28/2022  -     TSH; Future; Expected date: 10/28/2022  -     Urinalysis, Reflex to Urine Culture Urine, Clean Catch; Future; Expected date: 10/28/2022  Continue to work on dietary improvements (decrease overall calorie intake, decrease sugar and carb intake, decrease animal protein intake)  Continue to exercise at least 30-40 minutes, 3 times per week  Immunizations were discussed and were up to date  Preventative exams were discussed and she will get her PAP soon.     Need for pneumococcal vaccination  -     (In Office Administered) Pneumococcal Polysaccharide Vaccine (23 Valent) (SQ/IM); Future; Expected date: 11/04/2022    Seborrheic dermatitis  Discussed Nizoral shampoo and using Elidel PRN    Polyarthralgia  -     Rheumatoid Factor; Future; Expected date: 10/28/2022  -     Sedimentation rate; Future; Expected date: 10/28/2022  -     C-Reactive Protein; Future; Expected date: 10/28/2022  -     FIONA Screen w/Reflex; Future; Expected date: 10/28/2022  -     Uric Acid; Future; Expected date: 10/28/2022  -     Ambulatory referral/consult to Rheumatology; Future; Expected date: 11/04/2022    Need for hepatitis C screening test  -     Hepatitis C Antibody; Future; Expected date: 10/28/2022      Possible Rheum disease (Bechets?).  Check lab work today.  Referral today.  Medication List with Changes/Refills   Current Medications    ALBUTEROL (PROVENTIL/VENTOLIN HFA) 90 MCG/ACTUATION INHALER    Inhale 2 puffs into the lungs every 6 (six) hours as needed for Wheezing or Shortness of Breath. Rescue    PIMECROLIMUS (ELIDEL) 1 % CREAM    Apply topically 2 (two) times daily.

## 2022-11-01 ENCOUNTER — PATIENT MESSAGE (OUTPATIENT)
Dept: DERMATOLOGY | Facility: CLINIC | Age: 27
End: 2022-11-01
Payer: MEDICAID

## 2022-11-01 LAB — ANA SER QL IF: NORMAL

## 2022-11-07 LAB
HUMAN PAPILLOMAVIRUS (HPV): NORMAL
PAP RECOMMENDATION EXT: NORMAL
PAP SMEAR: NORMAL

## 2022-11-08 ENCOUNTER — PATIENT MESSAGE (OUTPATIENT)
Dept: ADMINISTRATIVE | Facility: HOSPITAL | Age: 27
End: 2022-11-08
Payer: MEDICAID

## 2022-11-08 ENCOUNTER — PATIENT OUTREACH (OUTPATIENT)
Dept: ADMINISTRATIVE | Facility: HOSPITAL | Age: 27
End: 2022-11-08
Payer: MEDICAID

## 2022-11-08 NOTE — LETTER
November 16, 2022    Valerie Cole  [address] [facility] [address]             [facility]  [address]  [facility] [address]  Phone: [phone] Dear Ms. Cole:    We have tried to reach you by mychart unsuccessfully.    [facility] is committed to your overall health.  To help you get the most out of each of your visits, we will review your information to make sure you are up to date on all of your recommended tests and or procedures.       Your [facility] team has found that you may be due for:     Health Maintenance Due   Topic    Pneumococcal Vaccines (Age 0-64) (1 - PCV)    Pap Smear     COVID-19 Vaccine (3 - Booster for Pfizer series)    Influenza Vaccine (1)      If you have had any of the above done at another facility, please bring the records or information with you so that your record at [facility] will be complete and up to date.     If you have not had any of these tests or procedures done recently and would like to complete this testing before your appointment with Christoph Watson MD please call [phone] or send a message through your [facility] portal to your provider's office.      If you are currently taking medication, please bring it with you to your appointment for review.     Thanks,      Christoph Watson MD and your [facility] Team

## 2022-11-08 NOTE — PROGRESS NOTES
Pre-Visit Chart Review  For Appointment Scheduled on 11/22/2022    Health Maintenance Due   Topic    Pneumococcal Vaccines (Age 0-64) (1 - PCV)    Pap Smear     COVID-19 Vaccine (3 - Booster for Pfizer series)    Influenza Vaccine (1)

## 2022-12-28 ENCOUNTER — PATIENT MESSAGE (OUTPATIENT)
Dept: FAMILY MEDICINE | Facility: CLINIC | Age: 27
End: 2022-12-28
Payer: MEDICAID

## 2023-10-19 ENCOUNTER — OFFICE VISIT (OUTPATIENT)
Dept: URGENT CARE | Facility: CLINIC | Age: 28
End: 2023-10-19
Payer: COMMERCIAL

## 2023-10-19 VITALS
OXYGEN SATURATION: 97 % | DIASTOLIC BLOOD PRESSURE: 84 MMHG | BODY MASS INDEX: 21.47 KG/M2 | RESPIRATION RATE: 16 BRPM | TEMPERATURE: 98 F | SYSTOLIC BLOOD PRESSURE: 127 MMHG | HEIGHT: 70 IN | HEART RATE: 93 BPM | WEIGHT: 150 LBS

## 2023-10-19 DIAGNOSIS — R05.9 COUGH, UNSPECIFIED TYPE: ICD-10-CM

## 2023-10-19 DIAGNOSIS — J06.9 ACUTE URI: Primary | ICD-10-CM

## 2023-10-19 LAB
CTP QC/QA: YES
CTP QC/QA: YES
FLUAV AG NPH QL: NEGATIVE
FLUBV AG NPH QL: NEGATIVE
SARS-COV-2 AG RESP QL IA.RAPID: NEGATIVE

## 2023-10-19 PROCEDURE — 87804 INFLUENZA ASSAY W/OPTIC: CPT | Mod: QW,,, | Performed by: NURSE PRACTITIONER

## 2023-10-19 PROCEDURE — 99204 OFFICE O/P NEW MOD 45 MIN: CPT | Mod: 25,S$GLB,, | Performed by: NURSE PRACTITIONER

## 2023-10-19 PROCEDURE — 87811 SARS CORONAVIRUS 2 ANTIGEN POCT, MANUAL READ: ICD-10-PCS | Mod: QW,S$GLB,, | Performed by: NURSE PRACTITIONER

## 2023-10-19 PROCEDURE — 87804 POCT INFLUENZA A/B: ICD-10-PCS | Mod: QW,,, | Performed by: NURSE PRACTITIONER

## 2023-10-19 PROCEDURE — 87811 SARS-COV-2 COVID19 W/OPTIC: CPT | Mod: QW,S$GLB,, | Performed by: NURSE PRACTITIONER

## 2023-10-19 PROCEDURE — 99204 PR OFFICE/OUTPT VISIT, NEW, LEVL IV, 45-59 MIN: ICD-10-PCS | Mod: 25,S$GLB,, | Performed by: NURSE PRACTITIONER

## 2023-10-19 RX ORDER — GUAIFENESIN 600 MG/1
1200 TABLET, EXTENDED RELEASE ORAL 2 TIMES DAILY
Qty: 40 TABLET | Refills: 0 | Status: SHIPPED | OUTPATIENT
Start: 2023-10-19 | End: 2023-10-29

## 2023-10-19 RX ORDER — CETIRIZINE HYDROCHLORIDE 10 MG/1
10 TABLET ORAL DAILY
Qty: 10 TABLET | Refills: 0 | Status: SHIPPED | OUTPATIENT
Start: 2023-10-19 | End: 2023-11-09

## 2023-10-19 RX ORDER — AZITHROMYCIN 250 MG/1
TABLET, FILM COATED ORAL
Qty: 6 TABLET | Refills: 0 | Status: SHIPPED | OUTPATIENT
Start: 2023-10-19 | End: 2023-10-24

## 2023-10-19 RX ORDER — FLUTICASONE PROPIONATE 50 MCG
1 SPRAY, SUSPENSION (ML) NASAL DAILY
Qty: 9.9 ML | Refills: 0 | Status: SHIPPED | OUTPATIENT
Start: 2023-10-19 | End: 2023-10-31

## 2023-10-19 RX ORDER — BENZONATATE 100 MG/1
100 CAPSULE ORAL 3 TIMES DAILY PRN
Qty: 30 CAPSULE | Refills: 0 | Status: SHIPPED | OUTPATIENT
Start: 2023-10-19 | End: 2023-10-29

## 2023-10-19 NOTE — PROGRESS NOTES
"Subjective:      Patient ID: Valerie Cole is a 28 y.o. female.    Vitals:  height is 5' 10" (1.778 m) and weight is 68 kg (150 lb). Her temperature is 98.1 °F (36.7 °C). Her blood pressure is 127/84 and her pulse is 93. Her respiration is 16 and oxygen saturation is 97%.     Chief Complaint: Cough    Cough  This is a new problem. Episode onset: x 4 days. The problem has been gradually worsening. The cough is Productive of sputum (Light green). Associated symptoms include ear congestion, myalgias, nasal congestion, postnasal drip, rhinorrhea and a sore throat. Pertinent negatives include no chest pain, chills, ear pain, eye redness, fever, headaches, rash, shortness of breath or wheezing. Nothing aggravates the symptoms. She has tried nothing for the symptoms. The treatment provided no relief. Her past medical history is significant for asthma. There is no history of environmental allergies.       Constitution: Positive for fatigue. Negative for activity change, appetite change, chills, fever, unexpected weight change and generalized weakness.   HENT:  Positive for congestion, postnasal drip, sinus pressure and sore throat. Negative for ear pain, ear discharge, foreign body in ear, tinnitus, hearing loss, dental problem, mouth sores, tongue pain, facial swelling, sinus pain, trouble swallowing and voice change.    Neck: Negative for neck pain, neck stiffness and painful lymph nodes.   Cardiovascular:  Negative for chest pain, leg swelling, palpitations and sob on exertion.   Eyes:  Negative for eye trauma, eye discharge, eye itching, eye pain, eye redness, vision loss and eyelid swelling.   Respiratory:  Positive for cough, sputum production and asthma. Negative for chest tightness, COPD, shortness of breath and wheezing.    Gastrointestinal:  Negative for abdominal pain, nausea, vomiting, constipation, diarrhea, bright red blood in stool and dark colored stools.   Endocrine: hair loss, cold intolerance and heat " intolerance.   Genitourinary:  Negative for dysuria, frequency, urgency and hematuria.   Musculoskeletal:  Positive for muscle ache. Negative for pain, trauma, joint pain, joint swelling and abnormal ROM of joint.   Skin:  Negative for color change, pale, rash, wound and hives.   Allergic/Immunologic: Positive for asthma. Negative for environmental allergies, seasonal allergies, food allergies, hives and itching.   Neurological:  Negative for dizziness, history of vertigo, light-headedness, facial drooping, speech difficulty, headaches, disorientation, altered mental status, loss of consciousness and numbness.   Hematologic/Lymphatic: Negative for swollen lymph nodes and easy bruising/bleeding. Does not bruise/bleed easily.   Psychiatric/Behavioral:  Negative for altered mental status, disorientation, confusion, agitation, sleep disturbance and hallucinations.       Objective:     Physical Exam   Constitutional: She is oriented to person, place, and time. She appears well-developed. She is cooperative.   HENT:   Head: Normocephalic and atraumatic.   Ears:   Right Ear: Hearing, external ear and ear canal normal. A middle ear effusion is present.   Left Ear: Hearing, external ear and ear canal normal. A middle ear effusion is present.   Nose: Rhinorrhea present. No mucosal edema or nasal deformity. No epistaxis. Right sinus exhibits no maxillary sinus tenderness and no frontal sinus tenderness. Left sinus exhibits maxillary sinus tenderness. Left sinus exhibits no frontal sinus tenderness.   Mouth/Throat: Uvula is midline and mucous membranes are normal. No trismus in the jaw. Normal dentition. No uvula swelling. Posterior oropharyngeal erythema and cobblestoning present. No oropharyngeal exudate or posterior oropharyngeal edema. No tonsillar exudate.   Eyes: Conjunctivae and lids are normal.   Neck: Trachea normal and phonation normal. Neck supple.   Cardiovascular: Normal rate, regular rhythm, normal heart sounds  and normal pulses.   Pulmonary/Chest: Effort normal and breath sounds normal.   Abdominal: Normal appearance and bowel sounds are normal. Soft.   Musculoskeletal: Normal range of motion.         General: Normal range of motion.   Neurological: She is alert and oriented to person, place, and time. She exhibits normal muscle tone.   Skin: Skin is warm, dry and intact.   Psychiatric: Her speech is normal and behavior is normal. Judgment and thought content normal.   Nursing note and vitals reviewed.      Assessment:     1. Acute URI    2. Cough, unspecified type        Plan:       Acute URI  -     azithromycin (Z-CHAUNCEY) 250 MG tablet; Take 2 tablets by mouth on day 1; Take 1 tablet by mouth on days 2-5  Dispense: 6 tablet; Refill: 0  -     fluticasone propionate (FLONASE) 50 mcg/actuation nasal spray; 1 spray (50 mcg total) by Each Nostril route once daily.  Dispense: 9.9 mL; Refill: 0  -     cetirizine (ZYRTEC) 10 MG tablet; Take 1 tablet (10 mg total) by mouth once daily. for 10 days  Dispense: 10 tablet; Refill: 0  -     guaiFENesin (MUCINEX) 600 mg 12 hr tablet; Take 2 tablets (1,200 mg total) by mouth 2 (two) times daily. for 10 days  Dispense: 40 tablet; Refill: 0    Cough, unspecified type  -     SARS Coronavirus 2 Antigen, POCT Manual Read - negative  -     POCT Influenza A/B Rapid Antigen - negative  -     benzonatate (TESSALON) 100 MG capsule; Take 1 capsule (100 mg total) by mouth 3 (three) times daily as needed for Cough.  Dispense: 30 capsule; Refill: 0      Utilize over-the-counter Tylenol or Motrin as directed for fever.    Ensure adequate fluid intake.    Return to clinic for new or worsening symptoms.  Patient is recommended to follow-up with their PCP post discharge.    Total time spent on med rec, H&P, with over half of the time in direct patient care: 33 minutes       Additional MDM:     Heart Failure Score:   COPD = No

## 2023-10-19 NOTE — LETTER
October 19, 2023      [facility]  [address] [address] [id number]       Patient: Valerie Cole   YOB: 1995  Date of Visit: 10/19/2023    To Whom It May Concern:    Lizeth Cole  was at [facility] on 10/19/2023. The patient may return to work/school on 10/23 with no restrictions. If you have any questions or concerns, or if I can be of further assistance, please do not hesitate to contact me.    Sincerely,    Tez Qureshi NP

## 2023-10-31 ENCOUNTER — OFFICE VISIT (OUTPATIENT)
Dept: RHEUMATOLOGY | Facility: CLINIC | Age: 28
End: 2023-10-31
Payer: COMMERCIAL

## 2023-10-31 VITALS
HEIGHT: 70 IN | WEIGHT: 152.25 LBS | HEART RATE: 86 BPM | SYSTOLIC BLOOD PRESSURE: 131 MMHG | DIASTOLIC BLOOD PRESSURE: 82 MMHG | BODY MASS INDEX: 21.8 KG/M2

## 2023-10-31 DIAGNOSIS — Z87.19 HISTORY OF ORAL APHTHOUS ULCERS: ICD-10-CM

## 2023-10-31 DIAGNOSIS — M25.50 ARTHRALGIA, UNSPECIFIED JOINT: Primary | ICD-10-CM

## 2023-10-31 PROCEDURE — 1159F PR MEDICATION LIST DOCUMENTED IN MEDICAL RECORD: ICD-10-PCS | Mod: CPTII,S$GLB,, | Performed by: STUDENT IN AN ORGANIZED HEALTH CARE EDUCATION/TRAINING PROGRAM

## 2023-10-31 PROCEDURE — 3075F SYST BP GE 130 - 139MM HG: CPT | Mod: CPTII,S$GLB,, | Performed by: STUDENT IN AN ORGANIZED HEALTH CARE EDUCATION/TRAINING PROGRAM

## 2023-10-31 PROCEDURE — 99999 PR PBB SHADOW E&M-EST. PATIENT-LVL IV: ICD-10-PCS | Mod: PBBFAC,,, | Performed by: STUDENT IN AN ORGANIZED HEALTH CARE EDUCATION/TRAINING PROGRAM

## 2023-10-31 PROCEDURE — 99999 PR PBB SHADOW E&M-EST. PATIENT-LVL IV: CPT | Mod: PBBFAC,,, | Performed by: STUDENT IN AN ORGANIZED HEALTH CARE EDUCATION/TRAINING PROGRAM

## 2023-10-31 PROCEDURE — 3008F BODY MASS INDEX DOCD: CPT | Mod: CPTII,S$GLB,, | Performed by: STUDENT IN AN ORGANIZED HEALTH CARE EDUCATION/TRAINING PROGRAM

## 2023-10-31 PROCEDURE — 3079F DIAST BP 80-89 MM HG: CPT | Mod: CPTII,S$GLB,, | Performed by: STUDENT IN AN ORGANIZED HEALTH CARE EDUCATION/TRAINING PROGRAM

## 2023-10-31 PROCEDURE — 3075F PR MOST RECENT SYSTOLIC BLOOD PRESS GE 130-139MM HG: ICD-10-PCS | Mod: CPTII,S$GLB,, | Performed by: STUDENT IN AN ORGANIZED HEALTH CARE EDUCATION/TRAINING PROGRAM

## 2023-10-31 PROCEDURE — 1159F MED LIST DOCD IN RCRD: CPT | Mod: CPTII,S$GLB,, | Performed by: STUDENT IN AN ORGANIZED HEALTH CARE EDUCATION/TRAINING PROGRAM

## 2023-10-31 PROCEDURE — 99204 PR OFFICE/OUTPT VISIT, NEW, LEVL IV, 45-59 MIN: ICD-10-PCS | Mod: S$GLB,,, | Performed by: STUDENT IN AN ORGANIZED HEALTH CARE EDUCATION/TRAINING PROGRAM

## 2023-10-31 PROCEDURE — 3079F PR MOST RECENT DIASTOLIC BLOOD PRESSURE 80-89 MM HG: ICD-10-PCS | Mod: CPTII,S$GLB,, | Performed by: STUDENT IN AN ORGANIZED HEALTH CARE EDUCATION/TRAINING PROGRAM

## 2023-10-31 PROCEDURE — 99204 OFFICE O/P NEW MOD 45 MIN: CPT | Mod: S$GLB,,, | Performed by: STUDENT IN AN ORGANIZED HEALTH CARE EDUCATION/TRAINING PROGRAM

## 2023-10-31 PROCEDURE — 3008F PR BODY MASS INDEX (BMI) DOCUMENTED: ICD-10-PCS | Mod: CPTII,S$GLB,, | Performed by: STUDENT IN AN ORGANIZED HEALTH CARE EDUCATION/TRAINING PROGRAM

## 2023-10-31 NOTE — PROGRESS NOTES
RHEUMATOLOGY OUTPATIENT CLINIC NOTE    10/31/2023    Attending Rheumatologist: Milagro Alexander  Primary Care Provider: Christoph Watson MD   Physician Requesting Consultation: Christoph Watson MD  [address] [facility] [address] [place]  [place] [address]  Chief Complaint/Reason For Consultation:  Joint Pain and Fatigue      Subjective:       HPI  Valerie Cole is a 28 y.o. White female with no medical history who comes for evaluation of joint pain.     She reports different joint pain for 10+ years.   Pain in bilateral knees, right greater than left, with no swelling.   Pain in left foot, she had injury while in college that resulted in fracture that did not heal properly.   Pain in left wrist, without swelling.   Pain in right shoulder, without swelling.   She denies any other injuries or hx of dislocation. However she feels as if they are dislocated especially at night when she sleeps on her shoulder or her knees.   Morning stiffness <1 hour.   Feels that overall pain is worse at the end of the day.     Reports fatigue with occasional dizziness. She passed out one time as a teenager.     Reports oral ulcers for many years, she does not recall a time of not having them. They are painful and can last up to a week. Sometimes has them once or twice per month, other times they can be more frequent. They improved somewhat after she changed toothpaste but still happening.     She reports erythematous rash in the whole face. Saw dermatology and was prescribed pimecrolimus but it burnt so she stopped using it. Rash improved with changes in detergent, fragances. Rash was not related to sun exposure.     Denies any hematochezia or melena. She has constipation. No abdominal pain     Has been having migraines recently. Starts in the right eye then it goes to frontar area. Takes ibuprofen as needed which helps along with sleeping and icepack.     She denies any family history of autoimmune conditions however reports early OA in  her mother and uncles.     Answers submitted by the patient for this visit:  Rheumatology Questionnaire (Submitted on 10/31/2023)  fever: No  eye redness: No  mouth sores: Yes  headaches: Yes  shortness of breath: No  chest pain: No  trouble swallowing: No  diarrhea: No  constipation: Yes  unexpected weight change: No  genital sore: No  dysuria: No  During the last 3 days, have you had a skin rash?: No  Bruises or bleeds easily: Yes  cough: No        Chronic comorbid conditions affecting medical decision making today:  Past Medical History:   Diagnosis Date    Asthma     GERD (gastroesophageal reflux disease)     Kidney stones      History reviewed. No pertinent surgical history.  Family History   Problem Relation Age of Onset    Arthritis Mother     Hypertension Mother     Hypertension Father     Hypertension Maternal Aunt     Hypertension Maternal Uncle      Social History     Substance and Sexual Activity   Alcohol Use No     Social History     Tobacco Use   Smoking Status Never   Smokeless Tobacco Never     Social History     Substance and Sexual Activity   Drug Use Never       Current Outpatient Medications:     cetirizine (ZYRTEC) 10 MG tablet, Take 1 tablet (10 mg total) by mouth once daily. for 10 days, Disp: 10 tablet, Rfl: 0     Objective:         Vitals:    10/31/23 1420   BP: 131/82   Pulse: 86     Physical Exam   Constitutional: She is oriented to person, place, and time. She appears well-developed.   HENT:   Head: Normocephalic.   Mouth/Throat: Mucous membranes are moist.   Salivary pool adequate  Oral aperture is normal   Mouth/Throat: No ulcers   Pulmonary/Chest: Effort normal.   Abdominal: Soft.   Musculoskeletal:      Cervical back: Neck supple.      Comments: No synovitis on examination   Beighton score 3/9   Lymphadenopathy:     She has no cervical adenopathy.   Neurological: She is alert and oriented to person, place, and time.   Skin: No rash noted.   No skin rashes noted  No skin  laxity  "  Vitals reviewed.      Reviewed old and all outside pertinent medical records available.    All lab results personally reviewed and interpreted by me.  Lab Results   Component Value Date    WBC 4.44 10/18/2022    HGB 12.8 10/18/2022    HCT 38.7 10/18/2022    MCV 95 10/18/2022    MCH 31.4 (H) 10/18/2022    MCHC 33.1 10/18/2022    RDW 12.2 10/18/2022     10/18/2022    MPV 9.9 10/18/2022       Lab Results   Component Value Date     10/18/2022    K 3.6 10/18/2022     10/18/2022    CO2 24 10/18/2022    GLU 86 10/18/2022    BUN 15 10/18/2022    CALCIUM 9.2 10/18/2022    PROT 7.4 10/18/2022    ALBUMIN 4.4 10/18/2022    BILITOT 0.9 10/18/2022    AST 19 10/18/2022    ALKPHOS 51 (L) 10/18/2022    ALT 13 10/18/2022       Lab Results   Component Value Date    COLORU Yellow 10/28/2022    APPEARANCEUA Clear 10/28/2022    SPECGRAV >=1.030 (A) 10/28/2022    PHUR 6.0 10/28/2022    PROTEINUA Negative 10/28/2022    KETONESU Negative 10/28/2022    LEUKOCYTESUR Negative 10/28/2022    NITRITE Negative 10/28/2022    UROBILINOGEN Negative 07/03/2017       Lab Results   Component Value Date    CRP 0.8 10/28/2022       Lab Results   Component Value Date    RF <13.0 10/28/2022    SEDRATE <2 10/28/2022       No components found for: "25OHVITDTOT", "89OXJJEJ0", "57XZVVTB8", "METHODNOTE"    Lab Results   Component Value Date    URICACID 5.1 10/28/2022       Lab Results   Component Value Date    HEPCAB Non-reactive 10/28/2022       Imaging:  All imaging reviewed and independently interpreted by me.         ASSESSMENT / PLAN:     Valerie Cole is a 28 y.o. White female with:      1. Arthralgia, unspecified joint  -she has some component of hypermobility. Discussed with patient that management is with physical therapy and exercise to build muscle around her joints.   -Will obtain arthritis survey today.   -neg RF and FIONA. Normal ESR and CRP  -no signs or symptoms of inflammatory arthritis     2. History of oral aphthous " ulcers  -present since childhood  -recommend checking HSV culture when active ulcer to see if they are related to HSV. This can be done at her PCP office as it has to be collected in the clinic and not the lab.   -she showed me picture of the ulcers and I have uploaded them to media.     Follow up if symptoms worsen or fail to improve.    Method of contact with patient concerns: [facility]    Disclaimer:  This note is prepared using voice recognition software and as such is likely to have errors and has not been proof read. Please contact me for questions.     Time spent: 45 minutes in face to face discussion concerning diagnosis, prognosis, review of lab and test results, benefits of treatment as well as management of disease, counseling of patient and coordination of care between various health care providers.  Greater than half the time spent was used for coordination of care and counseling of patient.    Milagro Alexander M.D.  Rheumatology  [facility]

## 2023-11-01 ENCOUNTER — HOSPITAL ENCOUNTER (OUTPATIENT)
Dept: RADIOLOGY | Facility: HOSPITAL | Age: 28
Discharge: HOME | End: 2023-11-01
Attending: STUDENT IN AN ORGANIZED HEALTH CARE EDUCATION/TRAINING PROGRAM
Payer: COMMERCIAL

## 2023-11-01 DIAGNOSIS — M25.50 ARTHRALGIA, UNSPECIFIED JOINT: ICD-10-CM

## 2023-11-01 PROCEDURE — 77077 JOINT SURVEY SINGLE VIEW: CPT | Mod: TC,PO

## 2023-11-01 PROCEDURE — 77077 JOINT SURVEY SINGLE VIEW: CPT | Mod: 26,,, | Performed by: RADIOLOGY

## 2023-11-01 PROCEDURE — 73030 X-RAY EXAM OF SHOULDER: CPT | Mod: TC,50,FY,PO

## 2023-11-01 PROCEDURE — 77077 XR ARTHRITIS SURVEY: ICD-10-PCS | Mod: 26,,, | Performed by: RADIOLOGY

## 2023-11-01 PROCEDURE — 73030 X-RAY EXAM OF SHOULDER: CPT | Mod: 26,50,, | Performed by: RADIOLOGY

## 2023-11-01 PROCEDURE — 73030 XR SHOULDER COMPLETE 2 OR MORE VIEWS BILATERAL: ICD-10-PCS | Mod: 26,50,, | Performed by: RADIOLOGY

## 2023-11-09 ENCOUNTER — OFFICE VISIT (OUTPATIENT)
Dept: FAMILY MEDICINE | Facility: CLINIC | Age: 28
End: 2023-11-09
Payer: COMMERCIAL

## 2023-11-09 ENCOUNTER — LAB VISIT (OUTPATIENT)
Dept: LAB | Facility: HOSPITAL | Age: 28
End: 2023-11-09
Attending: FAMILY MEDICINE
Payer: COMMERCIAL

## 2023-11-09 ENCOUNTER — HOSPITAL ENCOUNTER (OUTPATIENT)
Dept: RADIOLOGY | Facility: HOSPITAL | Age: 28
Discharge: HOME | End: 2023-11-09
Attending: FAMILY MEDICINE
Payer: COMMERCIAL

## 2023-11-09 VITALS
HEIGHT: 70 IN | BODY MASS INDEX: 22.09 KG/M2 | HEART RATE: 89 BPM | SYSTOLIC BLOOD PRESSURE: 120 MMHG | WEIGHT: 154.31 LBS | DIASTOLIC BLOOD PRESSURE: 78 MMHG | OXYGEN SATURATION: 99 %

## 2023-11-09 DIAGNOSIS — G44.52 NEW DAILY PERSISTENT HEADACHE: ICD-10-CM

## 2023-11-09 DIAGNOSIS — R20.2 NUMBNESS AND TINGLING: Primary | ICD-10-CM

## 2023-11-09 DIAGNOSIS — R11.0 NAUSEA: ICD-10-CM

## 2023-11-09 DIAGNOSIS — R20.0 NUMBNESS AND TINGLING: Primary | ICD-10-CM

## 2023-11-09 DIAGNOSIS — R53.83 OTHER FATIGUE: ICD-10-CM

## 2023-11-09 DIAGNOSIS — R20.0 NUMBNESS AND TINGLING: ICD-10-CM

## 2023-11-09 DIAGNOSIS — R20.2 NUMBNESS AND TINGLING: ICD-10-CM

## 2023-11-09 LAB
ALBUMIN SERPL BCP-MCNC: 4.3 G/DL (ref 3.5–5.2)
ALP SERPL-CCNC: 58 U/L (ref 55–135)
ALT SERPL W/O P-5'-P-CCNC: 29 U/L (ref 10–44)
ANION GAP SERPL CALC-SCNC: 9 MMOL/L (ref 8–16)
AST SERPL-CCNC: 26 U/L (ref 10–40)
BASOPHILS # BLD AUTO: 0.02 K/UL (ref 0–0.2)
BASOPHILS NFR BLD: 0.5 % (ref 0–1.9)
BILIRUB SERPL-MCNC: 0.8 MG/DL (ref 0.1–1)
BUN SERPL-MCNC: 8 MG/DL (ref 6–20)
CALCIUM SERPL-MCNC: 9.3 MG/DL (ref 8.7–10.5)
CHLORIDE SERPL-SCNC: 106 MMOL/L (ref 95–110)
CO2 SERPL-SCNC: 25 MMOL/L (ref 23–29)
CREAT SERPL-MCNC: 0.9 MG/DL (ref 0.5–1.4)
DIFFERENTIAL METHOD: ABNORMAL
EOSINOPHIL # BLD AUTO: 0 K/UL (ref 0–0.5)
EOSINOPHIL NFR BLD: 0.5 % (ref 0–8)
ERYTHROCYTE [DISTWIDTH] IN BLOOD BY AUTOMATED COUNT: 12.6 % (ref 11.5–14.5)
EST. GFR  (NO RACE VARIABLE): >60 ML/MIN/1.73 M^2
FOLATE SERPL-MCNC: 5.8 NG/ML (ref 4–24)
GLUCOSE SERPL-MCNC: 96 MG/DL (ref 70–110)
HCT VFR BLD AUTO: 37.5 % (ref 37–48.5)
HGB BLD-MCNC: 12.9 G/DL (ref 12–16)
IMM GRANULOCYTES # BLD AUTO: 0.01 K/UL (ref 0–0.04)
IMM GRANULOCYTES NFR BLD AUTO: 0.2 % (ref 0–0.5)
LYMPHOCYTES # BLD AUTO: 1.7 K/UL (ref 1–4.8)
LYMPHOCYTES NFR BLD: 41.2 % (ref 18–48)
MCH RBC QN AUTO: 31.5 PG (ref 27–31)
MCHC RBC AUTO-ENTMCNC: 34.4 G/DL (ref 32–36)
MCV RBC AUTO: 92 FL (ref 82–98)
MONOCYTES # BLD AUTO: 0.3 K/UL (ref 0.3–1)
MONOCYTES NFR BLD: 7.7 % (ref 4–15)
NEUTROPHILS # BLD AUTO: 2 K/UL (ref 1.8–7.7)
NEUTROPHILS NFR BLD: 49.9 % (ref 38–73)
NRBC BLD-RTO: 0 /100 WBC
PLATELET # BLD AUTO: 175 K/UL (ref 150–450)
PMV BLD AUTO: 8.8 FL (ref 9.2–12.9)
POTASSIUM SERPL-SCNC: 3.9 MMOL/L (ref 3.5–5.1)
PROT SERPL-MCNC: 7 G/DL (ref 6–8.4)
RBC # BLD AUTO: 4.1 M/UL (ref 4–5.4)
SODIUM SERPL-SCNC: 140 MMOL/L (ref 136–145)
TSH SERPL DL<=0.005 MIU/L-ACNC: 1.24 UIU/ML (ref 0.4–4)
VIT B12 SERPL-MCNC: 378 PG/ML (ref 210–950)
WBC # BLD AUTO: 4.05 K/UL (ref 3.9–12.7)

## 2023-11-09 PROCEDURE — 3078F PR MOST RECENT DIASTOLIC BLOOD PRESSURE < 80 MM HG: ICD-10-PCS | Mod: CPTII,S$GLB,, | Performed by: FAMILY MEDICINE

## 2023-11-09 PROCEDURE — 84443 ASSAY THYROID STIM HORMONE: CPT | Performed by: FAMILY MEDICINE

## 2023-11-09 PROCEDURE — 99214 PR OFFICE/OUTPT VISIT, EST, LEVL IV, 30-39 MIN: ICD-10-PCS | Mod: S$GLB,,, | Performed by: FAMILY MEDICINE

## 2023-11-09 PROCEDURE — 80053 COMPREHEN METABOLIC PANEL: CPT | Mod: PO | Performed by: FAMILY MEDICINE

## 2023-11-09 PROCEDURE — 3074F SYST BP LT 130 MM HG: CPT | Mod: CPTII,S$GLB,, | Performed by: FAMILY MEDICINE

## 2023-11-09 PROCEDURE — 99999 PR PBB SHADOW E&M-EST. PATIENT-LVL III: CPT | Mod: PBBFAC,,, | Performed by: FAMILY MEDICINE

## 2023-11-09 PROCEDURE — 3008F BODY MASS INDEX DOCD: CPT | Mod: CPTII,S$GLB,, | Performed by: FAMILY MEDICINE

## 2023-11-09 PROCEDURE — 82607 VITAMIN B-12: CPT | Performed by: FAMILY MEDICINE

## 2023-11-09 PROCEDURE — 36415 COLL VENOUS BLD VENIPUNCTURE: CPT | Mod: PO | Performed by: FAMILY MEDICINE

## 2023-11-09 PROCEDURE — 70551 MRI BRAIN STEM W/O DYE: CPT | Mod: TC,PO

## 2023-11-09 PROCEDURE — 85025 COMPLETE CBC W/AUTO DIFF WBC: CPT | Mod: PO | Performed by: FAMILY MEDICINE

## 2023-11-09 PROCEDURE — 3074F PR MOST RECENT SYSTOLIC BLOOD PRESSURE < 130 MM HG: ICD-10-PCS | Mod: CPTII,S$GLB,, | Performed by: FAMILY MEDICINE

## 2023-11-09 PROCEDURE — 3008F PR BODY MASS INDEX (BMI) DOCUMENTED: ICD-10-PCS | Mod: CPTII,S$GLB,, | Performed by: FAMILY MEDICINE

## 2023-11-09 PROCEDURE — 3078F DIAST BP <80 MM HG: CPT | Mod: CPTII,S$GLB,, | Performed by: FAMILY MEDICINE

## 2023-11-09 PROCEDURE — 99214 OFFICE O/P EST MOD 30 MIN: CPT | Mod: S$GLB,,, | Performed by: FAMILY MEDICINE

## 2023-11-09 PROCEDURE — 82746 ASSAY OF FOLIC ACID SERUM: CPT | Performed by: FAMILY MEDICINE

## 2023-11-09 PROCEDURE — 99999 PR PBB SHADOW E&M-EST. PATIENT-LVL III: ICD-10-PCS | Mod: PBBFAC,,, | Performed by: FAMILY MEDICINE

## 2023-11-09 RX ORDER — ONDANSETRON 4 MG/1
4 TABLET, ORALLY DISINTEGRATING ORAL EVERY 8 HOURS PRN
Qty: 30 TABLET | Refills: 0 | Status: SHIPPED | OUTPATIENT
Start: 2023-11-09 | End: 2024-02-07

## 2023-11-10 ENCOUNTER — TELEPHONE (OUTPATIENT)
Dept: FAMILY MEDICINE | Facility: CLINIC | Age: 28
End: 2023-11-10
Payer: COMMERCIAL

## 2023-11-10 ENCOUNTER — CLINICAL SUPPORT (OUTPATIENT)
Dept: FAMILY MEDICINE | Facility: CLINIC | Age: 28
End: 2023-11-10
Payer: COMMERCIAL

## 2023-11-10 ENCOUNTER — TELEPHONE (OUTPATIENT)
Dept: NEUROLOGY | Facility: CLINIC | Age: 28
End: 2023-11-10
Payer: COMMERCIAL

## 2023-11-10 ENCOUNTER — CLINICAL SUPPORT (OUTPATIENT)
Dept: REHABILITATION | Facility: HOSPITAL | Age: 28
End: 2023-11-10
Payer: COMMERCIAL

## 2023-11-10 DIAGNOSIS — M89.9 SCAPULAR DYSFUNCTION: ICD-10-CM

## 2023-11-10 DIAGNOSIS — M25.50 ARTHRALGIA, UNSPECIFIED JOINT: ICD-10-CM

## 2023-11-10 DIAGNOSIS — R29.898 WEAKNESS OF RIGHT UPPER EXTREMITY: ICD-10-CM

## 2023-11-10 DIAGNOSIS — G44.52 NEW DAILY PERSISTENT HEADACHE: Primary | ICD-10-CM

## 2023-11-10 DIAGNOSIS — R29.3 POSTURE ABNORMALITY: ICD-10-CM

## 2023-11-10 PROCEDURE — 97112 NEUROMUSCULAR REEDUCATION: CPT

## 2023-11-10 PROCEDURE — 97161 PT EVAL LOW COMPLEX 20 MIN: CPT

## 2023-11-10 PROCEDURE — 99499 UNLISTED E&M SERVICE: CPT | Mod: S$GLB,,, | Performed by: FAMILY MEDICINE

## 2023-11-10 PROCEDURE — 96372 PR INJECTION,THERAP/PROPH/DIAG2ST, IM OR SUBCUT: ICD-10-PCS | Mod: S$GLB,,, | Performed by: FAMILY MEDICINE

## 2023-11-10 PROCEDURE — 99499 NO LOS: ICD-10-PCS | Mod: S$GLB,,, | Performed by: FAMILY MEDICINE

## 2023-11-10 PROCEDURE — 96372 THER/PROPH/DIAG INJ SC/IM: CPT | Mod: S$GLB,,, | Performed by: FAMILY MEDICINE

## 2023-11-10 RX ORDER — KETOROLAC TROMETHAMINE 30 MG/ML
30 INJECTION, SOLUTION INTRAMUSCULAR; INTRAVENOUS ONCE
Status: COMPLETED | OUTPATIENT
Start: 2023-11-10 | End: 2023-11-10

## 2023-11-10 RX ORDER — RIMEGEPANT SULFATE 75 MG/75MG
75 TABLET, ORALLY DISINTEGRATING ORAL ONCE AS NEEDED
Qty: 9 TABLET | Refills: 0 | Status: SHIPPED | OUTPATIENT
Start: 2023-11-10 | End: 2023-11-15

## 2023-11-10 RX ORDER — KETOROLAC TROMETHAMINE 30 MG/ML
30 INJECTION, SOLUTION INTRAMUSCULAR; INTRAVENOUS ONCE
Status: DISCONTINUED | OUTPATIENT
Start: 2023-11-10 | End: 2023-11-10

## 2023-11-10 RX ADMIN — KETOROLAC TROMETHAMINE 30 MG: 30 INJECTION, SOLUTION INTRAMUSCULAR; INTRAVENOUS at 02:11

## 2023-11-10 NOTE — PROGRESS NOTES
Assessment:       1. Numbness and tingling    2. Other fatigue    3. New daily persistent headache    4. Nausea        Plan:       Numbness and tingling:  New problem workup needed  -     CBC Auto Differential; Future; Expected date: 11/09/2023  -     Comprehensive Metabolic Panel; Future; Expected date: 11/09/2023  -     Vitamin B12; Future; Expected date: 11/09/2023  -     Folate; Future; Expected date: 11/09/2023    Other fatigue:  New problem workup needed  -     CBC Auto Differential; Future; Expected date: 11/09/2023  -     Comprehensive Metabolic Panel; Future; Expected date: 11/09/2023  -     TSH; Future; Expected date: 11/09/2023    New daily persistent headache:  New problem workup needed  -     MRI Brain Without Contrast; Future; Expected date: 11/09/2023    Nausea:  New problem workup needed  -     ondansetron (ZOFRAN-ODT) 4 MG TbDL; Dissolve 1 tablet (4 mg total) by mouth every 8 (eight) hours as needed (nausea).  Dispense: 30 tablet; Refill: 0         MRI stat secondary to the patient symptoms, if negative, will order Toradol injection and not take.  Prescription for Zofran was given to the patient.   Patient agreed with assessment and plan. Patient verbalized understanding.     Subjective:       Patient ID: Valerie Cole is a 28 y.o. female.    Chief Complaint: Headache (For 7 days with right eye involvement )    Headache   This is a recurrent problem. The current episode started in the past 7 days. The problem occurs constantly. The problem has been gradually worsening. The pain is located in the Bilateral region. The pain does not radiate. The pain quality is not similar to prior headaches. The quality of the pain is described as aching. The pain is moderate. Associated symptoms include nausea, numbness, phonophobia and tingling. Pertinent negatives include no abdominal pain, back pain, dizziness, ear pain, hearing loss, loss of balance, scalp tenderness, seizures, swollen glands, visual change  or weakness. Associated symptoms comments: Blurred vision in the right eye. Nothing aggravates the symptoms. She has tried acetaminophen, NSAIDs, darkened room and triptans for the symptoms. The treatment provided no relief. Her past medical history is significant for migraine headaches.   The patient stated that she wakes up with a headache and also goals asleep with the headache, the headache when her up in the middle night couple times at night.  The patient is been trying high dosage of ibuprofen, Tylenol, sumatriptan and nothing seemed to help.  The patient stated that she not eat the whole day, she is been drinking water.  She also feels nauseated when she has these headaches.  She also complains of numbness and tingling sensation in her cheeks and mouth.    Past medical history, past social history was reviewed and discussed with the patient.    Review of Systems   Constitutional:  Positive for activity change and fatigue. Negative for appetite change and chills.   HENT:  Negative for congestion, ear discharge, ear pain and hearing loss.    Eyes:  Negative for discharge and itching.   Respiratory:  Negative for choking and chest tightness.    Cardiovascular:  Negative for chest pain and palpitations.   Gastrointestinal:  Positive for nausea. Negative for abdominal distention, abdominal pain and constipation.   Endocrine: Negative for cold intolerance and heat intolerance.   Genitourinary:  Negative for dysuria and flank pain.   Musculoskeletal:  Negative for arthralgias and back pain.   Skin:  Negative for pallor and rash.   Allergic/Immunologic: Negative for environmental allergies and food allergies.   Neurological:  Positive for tingling, numbness and headaches. Negative for dizziness, seizures, facial asymmetry, weakness and loss of balance.   Hematological:  Negative for adenopathy. Does not bruise/bleed easily.   Psychiatric/Behavioral:  Negative for agitation, confusion, decreased concentration and  sleep disturbance.        Objective:      Physical Exam  Vitals and nursing note reviewed.   Constitutional:       General: She is not in acute distress.     Appearance: Normal appearance. She is well-developed and normal weight. She is not diaphoretic.   HENT:      Head: Normocephalic and atraumatic.      Right Ear: External ear normal.      Left Ear: External ear normal.      Nose: Nose normal.   Eyes:      General: No scleral icterus.        Right eye: No discharge.         Left eye: No discharge.      Conjunctiva/sclera: Conjunctivae normal.      Pupils: Pupils are equal, round, and reactive to light.   Cardiovascular:      Rate and Rhythm: Normal rate and regular rhythm.      Heart sounds: Normal heart sounds.   Pulmonary:      Effort: Pulmonary effort is normal. No respiratory distress.      Breath sounds: Normal breath sounds. No wheezing.   Musculoskeletal:         General: No tenderness or deformity.      Cervical back: Neck supple.   Skin:     Coloration: Skin is not pale.      Findings: No erythema.   Neurological:      General: No focal deficit present.      Mental Status: She is alert and oriented to person, place, and time. Mental status is at baseline.      Cranial Nerves: No cranial nerve deficit.      Sensory: No sensory deficit.      Motor: No weakness.      Coordination: Coordination normal.      Gait: Gait normal.   Psychiatric:         Behavior: Behavior normal.         Thought Content: Thought content normal.         Judgment: Judgment normal.

## 2023-11-10 NOTE — PLAN OF CARE
[facility]   Physical Therapy Initial Evaluation      Name: Valerie Cole  Clinic Number: [id number]    Therapy Diagnosis:   Encounter Diagnoses   Name Primary?    Arthralgia, unspecified joint     Posture abnormality     Weakness of right upper extremity     Scapular dysfunction         Physician: Milagro Alexander*    Physician Orders: PT Eval and Treat  Medical Diagnosis from Referral: M25.50 (ICD-10-CM) - Arthralgia, unspecified joint  Evaluation Date: 11/10/2023  Authorization Period Expiration: 11/30/2023  Plan of Care Expiration: 2/2/2024  Progress Note Due: 12/9/2023  Visit # / Visits authorized: 1/ 1   FOTO: 1/ 3    Precautions: Standard     Time In: 804am  Time Out: 904am  Total Billable Time: 60 minutes    Subjective     Date of onset: over a decade ago    History of current condition - Valerie reports: that she has had weird pain in multiple joints throughout her body. The worst is the R shoulder, R knee and L wrist. She feels like the R shoulder is most bothersome at this time. She recently saw a rheumatologist recently and was told that many of her joints over excessively mobile and she needs to strengthen them. 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 Every few months the pain would get so bad she could not lift her arm. This happens about every 2 months now but there is always a dull pain at rest. 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 She has tried to exercise to strengthen the the shoulder but it always ends up making it hurt more. Today she has a migraine that has been present for a week and there is tension throughout her neck and upper trap muscles as well.     Falls: Non    Imaging: B shoulder x-rays:   FINDINGS:  No fracture, dislocation, or osseous destructive process.  There is a probable  bone island visible in the right greater tuberosity.  No radiographically evident calcific tendinitis.  The left and right chest are clear    Prior Therapy: None  Social History: 1 story home  Occupation: Part time dance teacher, /   Prior Level of Function: no pain or difficulty with ADLs and dance activities   Current Level of Function: moderate pain and difficulty with ADLs and dance activities     Pain:  Current 2/10, worst 6/10, best 2/10   Location: right shoulder (deep inside)   Description: Aching and Dull  Aggravating Factors: teaching dance routines, lifting heavy objects over head, carrying heavy objects by her side and laying on her R side  Easing Factors: rest    Patients goals: to be able to exercise without pain and learn how the manage her symptoms at home      Medical History:   Past Medical History:   Diagnosis Date    Asthma     GERD (gastroesophageal reflux disease)     Kidney stones        Surgical History:   Valerie Cole  has no past surgical history on file.    Medications:   Valerie has a current medication list which includes the following prescription(s): cetirizine, ondansetron, and nurtec, and the following Facility-Administered Medications: ketorolac.    Allergies:   Review of patient's allergies indicates:  No Known Allergies     Objective      Observation: pt ambulates into the clinic independently today and in no acute distress at this time    Posture: she stands with a slightly kyphotic upper thoracic spine with flattening in her mid and lower T-spine. Her R scapula is elevated compared to her L. Her R scapular is anteriorly tipping and internally rotated. There is a sligh anterior glide of her R humeral head at rest.     Cervical Range of Motion:    Degrees Pain   Flexion 40 tight     Extension 60 Tight      Right Rotation 80 no     Left Rotation 80 no       Cervical Special Tests:  Compression: -  Spurling's: -  ULTT: not assessed    Passive  "Range of Motion (degrees):   Shoulder Right Left   Flexion 180 180   Abduction 180 180   ER at 0 80 (pain at end range) 70   ER at 45 90 (pain at end range) 80   ER at 90 120 (pain at end range) 110      Active Range of Motion (degrees):   Shoulder Right Left   Flexion 180 (pain at end range) 180   Abduction 180(pain at end range) 180   ER at 0 80 70       Upper Extremity Strength  (R) UE  (L) UE    Shoulder ER 3+/5 Shoulder ER 4/5   Shoulder IR 3+/5 Shoulder IR 4/5   Lower Trap 3-/5 Lower Trap 3+/5   Middle Trap 3-/5 Middle Trap 3+/5     Special Tests:    Instability:     Right Left   Sulcus Sign + (also painful) +   Anterior Apprehension Test + -   Relocation test + -   Load and shift test + (anteriorly) -     Labrum:     Right Left   Empire's Test + -   Biceps load 1 Test - -   Biceps load 2 test - -       Beighton index: 7/9    Joint Mobility: there is decreased posterior and inferior joint mobility on the R shoulder. There is excessive anterior joint mobility on the R side.    Palpation: TTP at the anterior and posterior aspect of the shoulder    Sensation: intact    Scapular Control/Dyskinesis:    Normal / Subtle / Obvious  Comments    Left  Normal     Right  Abnormal Excessive winging and anterior tipping during scapular upward rotation. PT manual assistance decreased pain at end range of shoulder flexion.          Intake Outcome Measure for FOTO Shoulder Survey    Therapist reviewed FOTO scores for Valerie Cole on 11/10/2023.   FOTO report - see Media section or FOTO account episode details.    Intake Score: 64%         Treatment     Total Treatment time (time-based codes) separate from Evaluation: 10 minutes     Valerie received the treatments listed below:      Neuromuscular reeducation to develop motor control for 10 minutes including:    ER and IR walkouts with RTB, 10x with 3" holds each  Hand heel rocks, 10x      Patient Education and Home Exercises     Education provided:   - HEP  - Plan of " care  - Excessive mobility in multiple joints  - importance of improving strength and stability around the shoulder    Written Home Exercises Provided: Patient instructed to cont prior HEP. Exercises were reviewed and Valerie was able to demonstrate them prior to the end of the session.  Valerie demonstrated good  understanding of the education provided. See EMR under Patient Instructions for exercises provided during therapy sessions.    Assessment     Valerie is a 28 y.o. female referred to outpatient Physical Therapy with a medical diagnosis of Arthralgia, unspecified joint. Patient presents with a chronic history of R shoulder pain. She displays painful but full shoulder ROM, excessive joint mobility of the R shoulder, + instability tests, general hypermobility of multiple joints, rotator cuff and scapular weakness, and impaired scapular mechanics.    Patient prognosis is Good.   Patient will benefit from skilled outpatient Physical Therapy to address the deficits stated above and in the chart below, provide patient /family education, and to maximize patientt's level of independence.     Plan of care discussed with patient: Yes  Patient's spiritual, cultural and educational needs considered and patient is agreeable to the plan of care and goals as stated below:     Anticipated Barriers for therapy: chronicity of symptoms, high Beighton index    Medical Necessity is demonstrated by the following  History  Co-morbidities and personal factors that may impact the plan of care [x] LOW: no personal factors / co-morbidities  [] MODERATE: 1-2 personal factors / co-morbidities  [] HIGH: 3+ personal factors / co-morbidities    Moderate / High Support Documentation:   Co-morbidities affecting plan of care: none    Personal Factors:   no deficits     Examination  Body Structures and Functions, activity limitations and participation restrictions that may impact the plan of care [] LOW: addressing 1-2 elements  [x]  MODERATE: 3+ elements  [] HIGH: 4+ elements (please support below)    Moderate / High Support Documentation: strength, motor control, joint mobility     Clinical Presentation [x] LOW: stable  [] MODERATE: Evolving  [] HIGH: Unstable     Decision Making/ Complexity Score: low       Goals:  Short Term Goals (6 Weeks):   1. Pt will be independent with HEP to supplement PT in improving functional use of R UE.  2. Pt will display full and pain free shoulder flexion and abduction ROM to demonstrate improved GH joint mechanics.   3. Pt will increase R UE strength by 1/2 MMT grade to demonstrate improved functional strength.   Long Term Goals (12 Weeks):   1. Pt will improve FOTO score to </=75% to decrease perceived limitation with carrying, moving, and handling objects.  2. Pt will be able to lift 15# overhead without pain to demonstrate improved overhead strength.   3. Pt will increase R UE strength by 1 MMT grade to demonstrate improved functional strength.   4. Pt will be able to perform all dance activities without pain to demonstrate improved overall QOL.   5. Pt will be independent with gym routine to demonstrate improved ability to maintain strength outside of the clinic.     Plan     Plan of care Certification: 11/10/2023 to 2/2/2024.    Outpatient Physical Therapy 1 times weekly for 12 weeks to include the following interventions: Gait Training, Manual Therapy, Moist Heat/ Ice, Neuromuscular Re-ed, Patient Education, Self Care, Therapeutic Activities, and Therapeutic Exercise.     GEOVANNA ZARAGOZA, PT        Physician's Signature: _________________________________________ Date: ________________

## 2023-11-10 NOTE — TELEPHONE ENCOUNTER
Returned Patient call, assisted in scheduling nurse visit for patient to come in for ketorolac injection per Dr. Ornelas.

## 2023-11-10 NOTE — PROGRESS NOTES
After obtaining consent, and per orders of Dr. Ornelas, injection of ketorolac given by Hannah Ibarra. Patient instructed to remain in clinic for 20 minutes afterwards, and to report any adverse reaction to me immediately.

## 2023-11-10 NOTE — TELEPHONE ENCOUNTER
----- Message from Racquel Rueda sent at 11/10/2023 11:27 AM CST -----  Contact: pt  Type:  Patient Returning Call    Who Called:  pt  Who Left Message for Patient:  Hannah  Does the patient know what this is regarding?:  inj  Best Call Back Number:  [phone]   Additional Information:  Thanks

## 2023-11-10 NOTE — TELEPHONE ENCOUNTER
Spoke to the pt, appt scheduled on 11/17/23 at 0800 with Jarvis Dumont for headaches.  Date, time and location discussed.

## 2023-11-15 ENCOUNTER — PATIENT MESSAGE (OUTPATIENT)
Dept: REHABILITATION | Facility: HOSPITAL | Age: 28
End: 2023-11-15
Payer: COMMERCIAL

## 2023-11-17 ENCOUNTER — OFFICE VISIT (OUTPATIENT)
Dept: NEUROLOGY | Facility: CLINIC | Age: 28
End: 2023-11-17
Payer: COMMERCIAL

## 2023-11-17 VITALS
RESPIRATION RATE: 17 BRPM | WEIGHT: 153.25 LBS | HEIGHT: 70 IN | HEART RATE: 72 BPM | TEMPERATURE: 98 F | SYSTOLIC BLOOD PRESSURE: 116 MMHG | BODY MASS INDEX: 21.94 KG/M2 | DIASTOLIC BLOOD PRESSURE: 68 MMHG

## 2023-11-17 DIAGNOSIS — G44.52 NEW DAILY PERSISTENT HEADACHE: ICD-10-CM

## 2023-11-17 DIAGNOSIS — R51.9 CHRONIC NONINTRACTABLE HEADACHE, UNSPECIFIED HEADACHE TYPE: Primary | ICD-10-CM

## 2023-11-17 DIAGNOSIS — G89.29 CHRONIC NONINTRACTABLE HEADACHE, UNSPECIFIED HEADACHE TYPE: Primary | ICD-10-CM

## 2023-11-17 PROCEDURE — 1160F PR REVIEW ALL MEDS BY PRESCRIBER/CLIN PHARMACIST DOCUMENTED: ICD-10-PCS | Mod: CPTII,S$GLB,, | Performed by: PHYSICIAN ASSISTANT

## 2023-11-17 PROCEDURE — 3074F PR MOST RECENT SYSTOLIC BLOOD PRESSURE < 130 MM HG: ICD-10-PCS | Mod: CPTII,S$GLB,, | Performed by: PHYSICIAN ASSISTANT

## 2023-11-17 PROCEDURE — 3008F PR BODY MASS INDEX (BMI) DOCUMENTED: ICD-10-PCS | Mod: CPTII,S$GLB,, | Performed by: PHYSICIAN ASSISTANT

## 2023-11-17 PROCEDURE — 1159F PR MEDICATION LIST DOCUMENTED IN MEDICAL RECORD: ICD-10-PCS | Mod: CPTII,S$GLB,, | Performed by: PHYSICIAN ASSISTANT

## 2023-11-17 PROCEDURE — 3078F PR MOST RECENT DIASTOLIC BLOOD PRESSURE < 80 MM HG: ICD-10-PCS | Mod: CPTII,S$GLB,, | Performed by: PHYSICIAN ASSISTANT

## 2023-11-17 PROCEDURE — 3074F SYST BP LT 130 MM HG: CPT | Mod: CPTII,S$GLB,, | Performed by: PHYSICIAN ASSISTANT

## 2023-11-17 PROCEDURE — 1159F MED LIST DOCD IN RCRD: CPT | Mod: CPTII,S$GLB,, | Performed by: PHYSICIAN ASSISTANT

## 2023-11-17 PROCEDURE — 99999 PR PBB SHADOW E&M-EST. PATIENT-LVL V: CPT | Mod: PBBFAC,,, | Performed by: PHYSICIAN ASSISTANT

## 2023-11-17 PROCEDURE — 99999 PR PBB SHADOW E&M-EST. PATIENT-LVL V: ICD-10-PCS | Mod: PBBFAC,,, | Performed by: PHYSICIAN ASSISTANT

## 2023-11-17 PROCEDURE — 99205 PR OFFICE/OUTPT VISIT, NEW, LEVL V, 60-74 MIN: ICD-10-PCS | Mod: S$GLB,,, | Performed by: PHYSICIAN ASSISTANT

## 2023-11-17 PROCEDURE — 1160F RVW MEDS BY RX/DR IN RCRD: CPT | Mod: CPTII,S$GLB,, | Performed by: PHYSICIAN ASSISTANT

## 2023-11-17 PROCEDURE — 3008F BODY MASS INDEX DOCD: CPT | Mod: CPTII,S$GLB,, | Performed by: PHYSICIAN ASSISTANT

## 2023-11-17 PROCEDURE — 99205 OFFICE O/P NEW HI 60 MIN: CPT | Mod: S$GLB,,, | Performed by: PHYSICIAN ASSISTANT

## 2023-11-17 PROCEDURE — 3078F DIAST BP <80 MM HG: CPT | Mod: CPTII,S$GLB,, | Performed by: PHYSICIAN ASSISTANT

## 2023-11-17 RX ORDER — PREDNISONE 20 MG/1
TABLET ORAL
Qty: 12 TABLET | Refills: 0 | Status: SHIPPED | OUTPATIENT
Start: 2023-11-17 | End: 2024-01-30

## 2023-11-17 RX ORDER — AMITRIPTYLINE HYDROCHLORIDE 10 MG/1
TABLET, FILM COATED ORAL
Qty: 60 TABLET | Refills: 6 | Status: SHIPPED | OUTPATIENT
Start: 2023-11-17 | End: 2024-01-18 | Stop reason: SDUPTHER

## 2023-11-17 NOTE — PATIENT INSTRUCTIONS
"      Consider tracking your headaches with migraine mirza for ios     To reduce headaches:  Try the elavil (amitriptyline) 1 pill approx 2 hours before bed every night, if after a week, no benefits/no side effects move up to 2 pills  Suggested that the patient research out OTC supplements (stressed NOT FDA regulated and should take at own risk).    To help prevent a headache:   Petadolex (butterbur root)  Vitamin B2 (riboflavin)  CoQ10  Magnesium  "Migraeeze" which is petadolex/Vitamin B2/woodrow  "Dolovent" which is magnesium/Vitamin B2/coq10    * all of the above can be found locally in a variety of shops or on the internet          To "factory reset"  Try the deltasone (prednisone) on full stomach at breakfast time only, 3 pills for 2 pills, 2 pills for 2 days, 1 pill fors 2 days     To abort headaches:  Try the nurtec, 1 pill at onset headache , can use 1 in 24 horu period, no more than 3 days use in week   "

## 2023-11-17 NOTE — PROGRESS NOTES
[facility]  [address] [facility] [address] [place] [address]  Phone:[phone]  Fax: [phone]   New Patient Consultation    Patient Name: Valerie Cole  : 1995  MRN:  [id number]  Today: 2023   chief complaint: Headache    PCP: Christoph Watson MD.       Assessment:   Valerie Cole is a 28 y.o. right handed female with a PMHx of: asthma, GERD, and nephrolithiasis   whom presents with her fiance at the request of the PCP for new daily persistent HA. HA appear to be both tension quality and migrainous (at times with aura). Will have her track her HA to better qualify. MRI brain (non contrasted) was normal.       Review:    ICD-10-CM ICD-9-CM   1. Chronic nonintractable headache, unspecified headache type  R51.9 784.0    G89.29    2. New daily persistent headache  G44.52 339.42   (#2, referral diagnosis)       Plan:   Discussed realistic goals of care with patient at length. Discussed medication options, need for lifestyle adjustment. Discussed treatment will take time. Goal will be to reduce frequency/intensity/quantity of HA, not to be completely HA free. Gave copy of [facility] triggers for migraine informational sheet (N.b., a standard I give to patients who come to seek my care in HA clinic, regardless if they have migraines or not) and discussed clinic's non narcotic policy re: HA. Patient voiced understanding and agreement.            -will have patient track HA, discussed alfredo for smart phone           -will have patient work on lifestyle                 -discussed potential for teratogenicity with treatment, if her family planning status should change, discussed I'd like her to let office know immediately. She voice agreement    For HA Prevention:  1 no topamax d/t PMHx of nephrolithiasis, no BB d/t PMHx of asthma   2 offered elavil vs zonegran, chose elavil, discussed adv effects/dosing, can self titrate up to 20 mg Q2h before bed, she agreed   3  gave list of supplements at her request to research (in AVS, stressed not FDA regulated)     For HA :  Has nurtec from PCP, reaffirmed dosing/adv effects with patient      To break up Headaches:  Course of deltasone to start tomorrow, discussed taper schedule (wrote it out with read back), take on full stomach at breakfast time only, take until completion and discussed side effects. The patient agreed.       Other:  N/a           All test results as well as any necessary instructions were reviewed and discussed with patient.    Review:  Orders Placed This Encounter    amitriptyline (ELAVIL) 10 MG tablet    predniSONE (DELTASONE) 20 MG tablet         Patient to return to PCP/other specialists for all other problems  Patient to continue on all medications as Rx'd   A detailed AVS was provided to the patient with patient readback   RTO- 2-3 months to check in   The patient indicates understanding of these issues and agrees to the plan.    HPI:   Valerie Cole is a 28 y.o.right handed, female with a PMHx of: asthma, GERD, and nephrolithiasis   whom presents with her fiance at the request of the PCP for new daily persistent HA.     HA HPI:  Start:HA in past (2 years ago) but were episodic (1 every few months), but became more prevalent in past 8-9 months  and in past 1-2 months more regularly, no triggering nidus per patient/fiance.   History of trauma (no), History of CNS infection (no), History of Stroke (no)  Location:right retroobital  and right temporal, however rarely on the left   Severity: range: 1-6/10  Duration:4+ hours   Frequency:15 HD in past month  Aura: vision change/blurred vision (infrequent)   Associated factors (bolded positive) WITH HA ( or migraine): Nausea, vomiting, photophobia, numbness in facie (rare), phonophobia, tinnitus, scalp pain, vision loss, diplopia, scintillations, eye pain, jaw pain, clumsiness (dropping things-?),  weakness?    Tried:ibuprofen   Triggers (in bold):  stress, lack of sleep, too much caffeine, too little caffeine, weather change, seasonal change, strong odours, bright lights, sunlight, food , not a trigger but patient states she does miss meals frequently   Currently having a HA?:yes   Positives in bold: Hx of Kidney Stones, asthma, GI bleed, osteoporosis, CAD/MI, CVA/TIA, DM    Imaging on file: MRI Brain (non contrasted) 11/9/2023-NML  Therapies tried in past: (failures to be marked, if known---why did it fail?)  Zofran  Toradol  Lexapro  Imitrex  Nurtec       Medication Reconciliation:   Current Outpatient Medications   Medication Sig Dispense Refill    ondansetron (ZOFRAN-ODT) 4 MG TbDL Dissolve 1 tablet (4 mg total) by mouth every 8 (eight) hours as needed (nausea). 30 tablet 0    cetirizine (ZYRTEC) 10 MG tablet Take 1 tablet (10 mg total) by mouth once daily. for 10 days 10 tablet 0    multivitamin capsule Take 1 capsule by mouth once daily.      OMEPRAZOLE ORAL Take by mouth.      rimegepant sulfate (NURTEC ODT ORAL) Take by mouth.       No current facility-administered medications for this visit.     Review of patient's allergies indicates:  No Known Allergies    PMHx:  Past Medical History:   Diagnosis Date    Asthma     GERD (gastroesophageal reflux disease)     Kidney stones      No past surgical history on file.    Fhx:  Family History   Problem Relation Age of Onset    Arthritis Mother     Hypertension Mother     Hypertension Father     Hypertension Maternal Aunt     Hypertension Maternal Uncle        Shx:   Social History     Socioeconomic History    Marital status:    Tobacco Use    Smoking status: Never    Smokeless tobacco: Never   Substance and Sexual Activity    Alcohol use: No    Drug use: Never     Social Determinants of Health     Financial Resource Strain: High Risk (11/9/2023)    Overall Financial Resource Strain (CARDIA)     Difficulty of Paying Living Expenses: Hard   Food Insecurity: Food Insecurity Present (11/9/2023)    Hunger  Vital Sign     Worried About Running Out of Food in the Last Year: Sometimes true     Ran Out of Food in the Last Year: Never true   Transportation Needs: No Transportation Needs (11/9/2023)    PRAPARE - Transportation     Lack of Transportation (Medical): No     Lack of Transportation (Non-Medical): No   Physical Activity: Sufficiently Active (11/9/2023)    Exercise Vital Sign     Days of Exercise per Week: 2 days     Minutes of Exercise per Session: 90 min   Stress: No Stress Concern Present (11/9/2023)    [employer] [place] [employer] - Occupational Stress Questionnaire     Feeling of Stress : Not at all   Social Connections: Unknown (11/9/2023)    Social Connection and Isolation Panel [NHANES]     Frequency of Communication with Friends and Family: More than three times a week     Frequency of Social Gatherings with Friends and Family: Once a week     Active Member of Clubs or Organizations: No     Attends Club or Organization Meetings: Patient refused     Marital Status: Living with partner   Housing Stability: High Risk (11/9/2023)    Housing Stability Vital Sign     Unable to Pay for Housing in the Last Year: Yes     Number of Places Lived in the Last Year: 1     Unstable Housing in the Last Year: No           Labs:   CMP  Sodium   Date Value Ref Range Status   11/09/2023 140 136 - 145 mmol/L Final     Potassium   Date Value Ref Range Status   11/09/2023 3.9 3.5 - 5.1 mmol/L Final     Chloride   Date Value Ref Range Status   11/09/2023 106 95 - 110 mmol/L Final     CO2   Date Value Ref Range Status   11/09/2023 25 23 - 29 mmol/L Final     Glucose   Date Value Ref Range Status   11/09/2023 96 70 - 110 mg/dL Final     BUN   Date Value Ref Range Status   11/09/2023 8 6 - 20 mg/dL Final     Creatinine   Date Value Ref Range Status   11/09/2023 0.9 0.5 - 1.4 mg/dL Final     Calcium   Date Value Ref Range Status   11/09/2023 9.3 8.7 - 10.5 mg/dL Final     Total Protein   Date Value Ref Range Status    2023 7.0 6.0 - 8.4 g/dL Final     Albumin   Date Value Ref Range Status   2023 4.3 3.5 - 5.2 g/dL Final     Total Bilirubin   Date Value Ref Range Status   2023 0.8 0.1 - 1.0 mg/dL Final     Comment:     For infants and newborns, interpretation of results should be based  on gestational age, weight and in agreement with clinical  observations.    Premature Infant recommended reference ranges:  Up to 24 hours.............<8.0 mg/dL  Up to 48 hours............<12.0 mg/dL  3-5 days..................<15.0 mg/dL  6-29 days.................<15.0 mg/dL       Alkaline Phosphatase   Date Value Ref Range Status   2023 58 55 - 135 U/L Final     AST   Date Value Ref Range Status   2023 26 10 - 40 U/L Final     ALT   Date Value Ref Range Status   2023 29 10 - 44 U/L Final     Anion Gap   Date Value Ref Range Status   2023 9 8 - 16 mmol/L Final     eGFR   Date Value Ref Range Status   2023 >60 >60 mL/min/1.73 m^2 Final     Lab Results   Component Value Date    WBC 4.05 2023    HGB 12.9 2023    HCT 37.5 2023    MCV 92 2023     2023       Lab Results   Component Value Date    TSH 1.239 2023         Imagin2023 MRI Brain (report): Clinical history is headache for 7 days, blurry vision right eye     Multiplanar images of the brain were obtained.     The ventricles and sulci are normal in size and configuration for age. There is no hemorrhage, mass or midline shift. There are no extra-axial fluid collections.  There is normal signal within the white matter on all sequences. There is no abnormality on the diffusion-weighted images to suggest acute infarct. Cerebellum and brainstem are normal.  There are flow voids in the cavernous portions of the internal carotid arteries and basilar artery indicating patency. The corpus callosum, cerebellar tonsils, midline structures and orbits are normal.     IMPRESSION: Negative MRI of the  "brain      Other testing:  Reviewed in chart     Note: I have independently reviewed any/all imaging/labs/tests and agree with the report (s) as documented.  Any discrepancies will be as noted/demarcated by free text.  JOE ZAVALA 11/17/2023                     ROS:   Review Of Systems (questions asked, positive or additions in BOLD)  Gen: Weight change, fatigue/malaise, pyrexia   HEENT: Tinnitus, headache,  blurred vision, eye pain, diplopia, photophobia,  nose bleeds, congestion, sore throat, jaw pain, scalp pain, neck stiffness   Card: Palpitations, CP   Pulm: SOB, cough   Vas: Easy bruising, easy bleeding   GI: N/V/D/C, incontinence, hematemesis, hematochezia    : incontinence, hematuria   Endocrine: Temp intolerance, polyuria, polydipsia   M/S: Neck pain, myalgia, back pain, joint pain, falls    Neuro: PER HPI   PSY: Memory loss, confusion, depression, anxiety, trouble in sleep, hallucinations          EXAM:   /68 (BP Location: Left arm, Patient Position: Sitting, BP Method: Medium (Automatic))   Pulse 72   Temp 97.8 °F (36.6 °C)   Resp 17   Ht 5' 10" (1.778 m)   Wt 69.5 kg (153 lb 3.5 oz)   LMP 10/28/2023 (Exact Date)   BMI 21.98 kg/m²    GEN:  NAD  HEENT: NC/AT, Frontalis was NTTP, temporalis was NTTP, vertex NTTP,  nares patent, dentition appropriate,  neck supple, trachea midline, Occiput and trapezius NTTP     EXTREM:    no edema present.    NEURO:  Mental Status:  Awake, alert and appropriately oriented to time, place, and person.  Normal attention and concentration.  Speech is fluent and appropriate language function (I.e., comprehension).     Cranial Nerves:     Pupils are equal and reactive to light.  Extraocular movements are intact and without nystagmus.  Visual fields are full to confrontation testing.    Facial movement is symmetric.  Facial sensation is intact.  Hearing is normal. Uvula in midline. FROM of neck in all (6) directions, shoulder shrug symmetrical. Tongue in midline " without fasiculation.     Motor:  RUE:appropriate against gravity and medium force as tested 5/5              LUE: appropriate against gravity and medium force as tested 5/5              RLE:appropriate against gravity and medium force as tested 5/5              LLE: appropriate against gravity and medium force as tested 5/5  Tremor/pronator drift not apparent.   finger extension strength was strong bilat     Sensory:  RUE  intact light touch, proprioception, and temperature  LUE intact light touch, proprioception, and temperature    RLE intact light touch  LLE intact light touch      DTR's:                                            R              L  biceps 2+ 2+         brachioradialis 2+ 2+   Knee jerk 2+ 2+        Coordination:  FTN-WNL.       Gait and Stance: Normal manner of stance and gait function testing. Romberg was negative.          This document has been electronically signed by Mr. Haider Dumont MPA, PA-C on 11/17/2023, I have personally typed this message using the EMR.       Dr Jesus MD was available during today's visit.     Personal Protective Equipment:    Personal Protective Equipment was used during this encounter including: non latex gloves.          CC: Christoph Watson MD/Cherelle Ornelas MD

## 2023-11-21 ENCOUNTER — PATIENT MESSAGE (OUTPATIENT)
Dept: REHABILITATION | Facility: HOSPITAL | Age: 28
End: 2023-11-21
Payer: COMMERCIAL

## 2023-11-22 ENCOUNTER — CLINICAL SUPPORT (OUTPATIENT)
Dept: REHABILITATION | Facility: HOSPITAL | Age: 28
End: 2023-11-22
Payer: COMMERCIAL

## 2023-11-22 DIAGNOSIS — R29.898 WEAKNESS OF RIGHT UPPER EXTREMITY: ICD-10-CM

## 2023-11-22 DIAGNOSIS — R29.3 POSTURE ABNORMALITY: Primary | ICD-10-CM

## 2023-11-22 DIAGNOSIS — M89.9 SCAPULAR DYSFUNCTION: ICD-10-CM

## 2023-11-22 PROCEDURE — 97110 THERAPEUTIC EXERCISES: CPT

## 2023-11-22 PROCEDURE — 97112 NEUROMUSCULAR REEDUCATION: CPT

## 2023-11-22 NOTE — PROGRESS NOTES
"  Physical Therapy Daily Treatment Note     Name: Valerie Cole  Clinic Number: [id number]    Therapy Diagnosis:   Encounter Diagnoses   Name Primary?    Posture abnormality Yes    Weakness of right upper extremity     Scapular dysfunction      Physician: Milagro Alexander*    Visit Date: 11/22/2023    Physician Orders: PT Eval and Treat  Medical Diagnosis from Referral: M25.50 (ICD-10-CM) - Arthralgia, unspecified joint  Evaluation Date: 11/10/2023  Authorization Period Expiration: 12/31/2023  Plan of Care Expiration: 2/2/2024  Progress Note Due: 12/9/2023  Visit # / Visits authorized: 1/ 12  FOTO: 1/ 3    1st FOTO Follow up:   2nd FOTO Follow up:     Time In: 904am  Time Out: 957am  Total Billable Time: 50 minutes    Precautions: Standard    Subjective     Pt reports: that overall she has been feeling good recently. Her exercises cause soreness for an hour or so in the shoulder but the pain does not linger.   She was compliant with home exercise program.  Response to previous treatment: no change  Functional change: ongoing    Pain: 2/10  Location: right shoulder (deep inside)      Objective     Valerie received therapeutic exercises to develop strength, endurance, and ROM for 10 minutes including:    Prone mid trap level 2, 3 x 8  Prone low trap level 2, 3 x 8    Valerie received the following manual therapy techniques: Joint mobilizations were applied to the: shoulder and thoracic spine for 0 minutes, including:      Valerie participated in neuromuscular re-education activities to improve: Coordination, Kinesthetic, Sense, and Proprioception for 40 minutes. The following activities were included:    Joint centration with ER and IR isometrics, 2 x 10 each with 3" holds  Rhythmic stabilization from PT, 2 x 30" each with arm in scapular plane and at 90 degrees flexion  Supine shoulder ER and IR with 2#, arm in scapular plane, 3 x 8  ER and IR walkouts with RTB, 10x with 3" holds each  Ball circles on the " "wall, clockwise and counter clockwise, 3 x 30" each direction  Hand heel rocks, 3 x 8  Serratus wall slides, 3 x 8    Valerie participated in dynamic functional therapeutic activities to improve functional performance for 0  minutes, including:      Home Exercises Provided and Patient Education Provided     Education provided:   - HEP  - Plan of care  - Excessive mobility in multiple joints  - importance of improving strength and stability around the shoulder    Written Home Exercises Provided: Patient instructed to cont prior HEP.  Exercises were reviewed and Valerie was able to demonstrate them prior to the end of the session.  Valerie demonstrated good  understanding of the education provided.     See EMR under Patient Instructions for exercises provided  11/10/2023 .    Assessment     Valerie remains with excessive GH mobility bilaterally at this time. There is some pain and apprehension at her end range of ER on the R side today. Manual cuff activation was performed today at the onset of treatment to get the cuff activated. This was tolerated well today. She was progressed with more rotator cuff loading and motor control exercises today with good tolerance. Periscapular strengthening was added today and appropriately challenged her.     Valerie Is progressing well towards her goals.   Pt prognosis is Good.     Pt will continue to benefit from skilled outpatient physical therapy to address the deficits listed in the problem list box on initial evaluation, provide pt/family education and to maximize pt's level of independence in the home and community environment.     Pt's spiritual, cultural and educational needs considered and pt agreeable to plan of care and goals.     Anticipated barriers to physical therapy: chronicity of symptoms, high Beighton index     Goals:   Short Term Goals (6 Weeks):   1. Pt will be independent with HEP to supplement PT in improving functional use of R UE. (Progressing)  2. Pt " will display full and pain free shoulder flexion and abduction ROM to demonstrate improved GH joint mechanics. (Progressing)  3. Pt will increase R UE strength by 1/2 MMT grade to demonstrate improved functional strength. (Progressing)  Long Term Goals (12 Weeks):   1. Pt will improve FOTO score to </=75% to decrease perceived limitation with carrying, moving, and handling objects.(Progressing)  2. Pt will be able to lift 15# overhead without pain to demonstrate improved overhead strength. (Progressing)  3. Pt will increase R UE strength by 1 MMT grade to demonstrate improved functional strength. (Progressing)  4. Pt will be able to perform all dance activities without pain to demonstrate improved overall QOL. (Progressing)  5. Pt will be independent with gym routine to demonstrate improved ability to maintain strength outside of the clinic. (Progressing)    Plan     Continue to progress per ESTEVAN ZARAGOZA, PT

## 2023-11-28 ENCOUNTER — CLINICAL SUPPORT (OUTPATIENT)
Dept: REHABILITATION | Facility: HOSPITAL | Age: 28
End: 2023-11-28
Payer: COMMERCIAL

## 2023-11-28 DIAGNOSIS — R29.3 POSTURE ABNORMALITY: Primary | ICD-10-CM

## 2023-11-28 DIAGNOSIS — R29.898 WEAKNESS OF RIGHT UPPER EXTREMITY: ICD-10-CM

## 2023-11-28 DIAGNOSIS — M89.9 SCAPULAR DYSFUNCTION: ICD-10-CM

## 2023-11-28 PROCEDURE — 97110 THERAPEUTIC EXERCISES: CPT

## 2023-11-28 PROCEDURE — 97112 NEUROMUSCULAR REEDUCATION: CPT

## 2023-11-28 NOTE — PROGRESS NOTES
"  Physical Therapy Daily Treatment Note     Name: Valerie Cole  Clinic Number: [id number]    Therapy Diagnosis:   Encounter Diagnoses   Name Primary?    Posture abnormality Yes    Weakness of right upper extremity     Scapular dysfunction        Physician: Milagro Alexander*    Visit Date: 11/28/2023    Physician Orders: PT Eval and Treat  Medical Diagnosis from Referral: M25.50 (ICD-10-CM) - Arthralgia, unspecified joint  Evaluation Date: 11/10/2023  Authorization Period Expiration: 12/31/2023  Plan of Care Expiration: 2/2/2024  Progress Note Due: 12/9/2023  Visit # / Visits authorized: 2/ 12  FOTO: 1/ 3    1st FOTO Follow up:   2nd FOTO Follow up:     Time In: 204pm  Time Out: 300pm  Total Billable Time: 52 minutes    Precautions: Standard    Subjective     Pt reports: that she was pretty sore after her previous treatment session. It lasted for 2 days but was better after the first day. She is doing well today.   She was compliant with home exercise program.  Response to previous treatment: no change  Functional change: ongoing    Pain: 2/10  Location: right shoulder (deep inside)      Objective     Valerie received therapeutic exercises to develop strength, endurance, and ROM for 10 minutes including:    Prone mid trap level 2, 3 x 8  Prone low trap level 2, 3 x 8    Valerie received the following manual therapy techniques: Joint mobilizations were applied to the: shoulder and thoracic spine for 0 minutes, including:      Valerie participated in neuromuscular re-education activities to improve: Coordination, Kinesthetic, Sense, and Proprioception for 42 minutes. The following activities were included:    Joint centration with ER and IR isometrics, 2 x 10 each with 3" holds  Rhythmic stabilization from PT, 2 x 30" each with arm at 90 and 120 degrees flexion  Prone shoulder ER and IR, towel under anterior shoulder for stability, 3 x 8  ER and IR walkouts with RTB, 10x with 3" holds each  Ball circles on " "the wall, clockwise and counter clockwise, 3 x 30" each direction  Hand heel rocks, 3 x 8  Serratus wall slides with YTB around wrists, 3 x 8    Valerie participated in dynamic functional therapeutic activities to improve functional performance for 0  minutes, including:      Home Exercises Provided and Patient Education Provided     Education provided:   - HEP  - Plan of care  - Excessive mobility in multiple joints  - importance of improving strength and stability around the shoulder    Written Home Exercises Provided: Patient instructed to cont prior HEP.  Exercises were reviewed and Valerie was able to demonstrate them prior to the end of the session.  Valerie demonstrated good  understanding of the education provided.     See EMR under Patient Instructions for exercises provided  11/10/2023 .    Assessment     Valerie displayed about 2 days of soreness following her previous treatment session but there is not residual pain present at this time. She had pain at her end range of ER and 90/90 today and there continues to be laxity as well. She was slightly progress with rotator cuff activation exercises today today with good tolerance. Wall slides caused the appropriate amount of fatigue today.     Valerie Is progressing well towards her goals.   Pt prognosis is Good.     Pt will continue to benefit from skilled outpatient physical therapy to address the deficits listed in the problem list box on initial evaluation, provide pt/family education and to maximize pt's level of independence in the home and community environment.     Pt's spiritual, cultural and educational needs considered and pt agreeable to plan of care and goals.     Anticipated barriers to physical therapy: chronicity of symptoms, high Beighton index     Goals:   Short Term Goals (6 Weeks):   1. Pt will be independent with HEP to supplement PT in improving functional use of R UE. (Progressing)  2. Pt will display full and pain free shoulder " flexion and abduction ROM to demonstrate improved GH joint mechanics. (Progressing)  3. Pt will increase R UE strength by 1/2 MMT grade to demonstrate improved functional strength. (Progressing)  Long Term Goals (12 Weeks):   1. Pt will improve FOTO score to </=75% to decrease perceived limitation with carrying, moving, and handling objects.(Progressing)  2. Pt will be able to lift 15# overhead without pain to demonstrate improved overhead strength. (Progressing)  3. Pt will increase R UE strength by 1 MMT grade to demonstrate improved functional strength. (Progressing)  4. Pt will be able to perform all dance activities without pain to demonstrate improved overall QOL. (Progressing)  5. Pt will be independent with gym routine to demonstrate improved ability to maintain strength outside of the clinic. (Progressing)    Plan     Continue to progress per ESTEVAN ZARAGOZA, PT

## 2023-12-04 ENCOUNTER — CLINICAL SUPPORT (OUTPATIENT)
Dept: REHABILITATION | Facility: HOSPITAL | Age: 28
End: 2023-12-04
Payer: COMMERCIAL

## 2023-12-04 DIAGNOSIS — M89.9 SCAPULAR DYSFUNCTION: ICD-10-CM

## 2023-12-04 DIAGNOSIS — R29.898 WEAKNESS OF RIGHT UPPER EXTREMITY: ICD-10-CM

## 2023-12-04 DIAGNOSIS — R29.3 POSTURE ABNORMALITY: Primary | ICD-10-CM

## 2023-12-04 PROCEDURE — 97530 THERAPEUTIC ACTIVITIES: CPT

## 2023-12-04 PROCEDURE — 97112 NEUROMUSCULAR REEDUCATION: CPT

## 2023-12-04 PROCEDURE — 97110 THERAPEUTIC EXERCISES: CPT

## 2023-12-04 NOTE — PROGRESS NOTES
"  Physical Therapy Daily Treatment Note     Name: Valerie Cole  Clinic Number: [id number]    Therapy Diagnosis:   Encounter Diagnoses   Name Primary?    Posture abnormality Yes    Weakness of right upper extremity     Scapular dysfunction        Physician: Milagro Alexander*    Visit Date: 12/4/2023    Physician Orders: PT Eval and Treat  Medical Diagnosis from Referral: M25.50 (ICD-10-CM) - Arthralgia, unspecified joint  Evaluation Date: 11/10/2023  Authorization Period Expiration: 12/31/2023  Plan of Care Expiration: 2/2/2024  Progress Note Due: 12/9/2023  Visit # / Visits authorized: 3/ 12  FOTO: 1/ 3    1st FOTO Follow up:   2nd FOTO Follow up:     Time In: 305pm  Time Out: 400pm  Total Billable Time: 55 minutes    Precautions: Standard    Subjective     Pt reports: that she was not as as sore after her last session that she was at her previous one. The exercises are starting to get easier.   She was compliant with home exercise program.  Response to previous treatment: no change  Functional change: ongoing    Pain: 2/10  Location: right shoulder (deep inside)      Objective     Valerie received therapeutic exercises to develop strength, endurance, and ROM for 10 minutes including:    Prone mid trap level 2, 3 x 8  Prone low trap level 2, 3 x 8    Valerie received the following manual therapy techniques: Joint mobilizations were applied to the: shoulder and thoracic spine for 0 minutes, including:      Valerie participated in neuromuscular re-education activities to improve: Coordination, Kinesthetic, Sense, and Proprioception for 35 minutes. The following activities were included:    Joint centration with ER and IR isometrics, 2 x 10 each with 3" holds  Rhythmic stabilization from PT, 2 x 30" each with arm at 90 and 120 degrees flexion  Prone shoulder ER and IR with 2#, towel under anterior shoulder for stability, 3 x 8  ER and IR walkouts with RTB, 10x with 3" holds each  Ball circles on the wall, " "clockwise and counter clockwise, 3 x 30" each direction  Hand heel rocks, 3 x 8  Serratus wall slides with YTB around wrists, 3 x 8    Valerie participated in dynamic functional therapeutic activities to improve functional performance for 10  minutes, including:    Modified pushup position on chair, alternating shoulder taps, 3 x 8   carries with 4#, 20 yards x5    Home Exercises Provided and Patient Education Provided     Education provided:   - HEP  - Plan of care  - Excessive mobility in multiple joints  - importance of improving strength and stability around the shoulder    Written Home Exercises Provided: Patient instructed to cont prior HEP.  Exercises were reviewed and Valerie was able to demonstrate them prior to the end of the session.  Valerie demonstrated good  understanding of the education provided.     See EMR under Patient Instructions for exercises provided  11/10/2023 .    Assessment     Valerie remains with poor cuff activation at this time so manual resistance continues to be applied to maximize motor control. She had some scapular pain today with cuff activation but this did not linger once the exercise was halted. She continues to be very fatigued with sustained rotator cuff exercises that requires excessive rest breaks at this time. Closed chain activities were initiated today and challenged her GH joint stability.     Valerie Is progressing well towards her goals.   Pt prognosis is Good.     Pt will continue to benefit from skilled outpatient physical therapy to address the deficits listed in the problem list box on initial evaluation, provide pt/family education and to maximize pt's level of independence in the home and community environment.     Pt's spiritual, cultural and educational needs considered and pt agreeable to plan of care and goals.     Anticipated barriers to physical therapy: chronicity of symptoms, high Beighton index     Goals:   Short Term Goals (6 Weeks):   1. " Pt will be independent with HEP to supplement PT in improving functional use of R UE. (Progressing)  2. Pt will display full and pain free shoulder flexion and abduction ROM to demonstrate improved GH joint mechanics. (Progressing)  3. Pt will increase R UE strength by 1/2 MMT grade to demonstrate improved functional strength. (Progressing)  Long Term Goals (12 Weeks):   1. Pt will improve FOTO score to </=75% to decrease perceived limitation with carrying, moving, and handling objects.(Progressing)  2. Pt will be able to lift 15# overhead without pain to demonstrate improved overhead strength. (Progressing)  3. Pt will increase R UE strength by 1 MMT grade to demonstrate improved functional strength. (Progressing)  4. Pt will be able to perform all dance activities without pain to demonstrate improved overall QOL. (Progressing)  5. Pt will be independent with gym routine to demonstrate improved ability to maintain strength outside of the clinic. (Progressing)    Plan     Continue to progress per ESTEVAN ZARAGOZA, PT

## 2023-12-11 ENCOUNTER — PATIENT MESSAGE (OUTPATIENT)
Dept: REHABILITATION | Facility: HOSPITAL | Age: 28
End: 2023-12-11

## 2023-12-11 ENCOUNTER — CLINICAL SUPPORT (OUTPATIENT)
Dept: REHABILITATION | Facility: HOSPITAL | Age: 28
End: 2023-12-11
Payer: COMMERCIAL

## 2023-12-11 DIAGNOSIS — R29.898 WEAKNESS OF RIGHT UPPER EXTREMITY: ICD-10-CM

## 2023-12-11 DIAGNOSIS — M89.9 SCAPULAR DYSFUNCTION: ICD-10-CM

## 2023-12-11 DIAGNOSIS — R29.3 POSTURE ABNORMALITY: Primary | ICD-10-CM

## 2023-12-11 PROCEDURE — 97112 NEUROMUSCULAR REEDUCATION: CPT

## 2023-12-11 PROCEDURE — 97110 THERAPEUTIC EXERCISES: CPT

## 2023-12-11 NOTE — PROGRESS NOTES
"  Physical Therapy Daily Treatment Note     Name: Valerie Cole  Clinic Number: [id number]    Therapy Diagnosis:   Encounter Diagnoses   Name Primary?    Posture abnormality Yes    Weakness of right upper extremity     Scapular dysfunction        Physician: Milagro Alexander*    Visit Date: 12/11/2023    Physician Orders: PT Eval and Treat  Medical Diagnosis from Referral: M25.50 (ICD-10-CM) - Arthralgia, unspecified joint  Evaluation Date: 11/10/2023  Authorization Period Expiration: 12/31/2023  Plan of Care Expiration: 2/2/2024  Progress Note Due: 12/9/2023  Visit # / Visits authorized: 4/ 12  FOTO: 1/ 3    1st FOTO Follow up:   2nd FOTO Follow up:     Time In: 318pm (notified me she will be late due to traffic)  Time Out: 405pm  Total Billable Time: 47 minutes    Precautions: Standard    Subjective     Pt reports: that she was sore for about 4 days after her previous treatment session. It was not too bad but the muscles were very sore and fatigued for a while.   She was compliant with home exercise program.  Response to previous treatment: no change  Functional change: ongoing    Pain: 2/10  Location: right shoulder (deep inside)      Objective     Valerie received therapeutic exercises to develop strength, endurance, and ROM for 8 minutes including:    Prone mid trap level 3, 3 x 8  Prone low trap level 3, 3 x 8    Valerie received the following manual therapy techniques: Joint mobilizations were applied to the: shoulder and thoracic spine for 0 minutes, including:      Valerie participated in neuromuscular re-education activities to improve: Coordination, Kinesthetic, Sense, and Proprioception for 39 minutes. The following activities were included:    Joint centration with ER and IR isometrics, 2 x 10 each with 3" holds  Rhythmic stabilization from PT, 2 x 30" each with arm at 90 and 120 degrees flexion  Prone shoulder ER and IR with 2#, towel under anterior shoulder for stability, 3 x 8  ER and IR " "walkouts with RTB, 2 x 10 with 3" holds   Ball circles on the wall, clockwise and counter clockwise, 3 x 30" each direction  Hand heel rocks, 3 x 8  Serratus wall slides with YTB around wrists, 3 x 8  Self rhythmic stabilization with YTB around wrists, arm by the side, 3 x 30"  Robots on the wall with YTB around wrists, 2 x 8    Valerie participated in dynamic functional therapeutic activities to improve functional performance for 00  minutes, including:    Modified pushup position on chair, alternating shoulder taps, 3 x 8   carries with 4#, 20 yards x5    Home Exercises Provided and Patient Education Provided     Education provided:   - HEP  - Plan of care  - Excessive mobility in multiple joints  - importance of improving strength and stability around the shoulder    Written Home Exercises Provided: Patient instructed to cont prior HEP.  Exercises were reviewed and Valerie was able to demonstrate them prior to the end of the session.  Valerie demonstrated good  understanding of the education provided.     See EMR under Patient Instructions for exercises provided  11/10/2023 .    Assessment     Valerie's rotator cuff control continues to be the main of focus of her treatment session. There is excessive fatigue when her rotator cuff is isolated but she tends to recover quickly within the treatment session. She continues to be challenged with scapular strengthening to ensure her GH mechanics improve. She was challenged with more overhead activities today but closed chain exercises were held today.     Valerie Is progressing well towards her goals.   Pt prognosis is Good.     Pt will continue to benefit from skilled outpatient physical therapy to address the deficits listed in the problem list box on initial evaluation, provide pt/family education and to maximize pt's level of independence in the home and community environment.     Pt's spiritual, cultural and educational needs considered and pt agreeable " to plan of care and goals.     Anticipated barriers to physical therapy: chronicity of symptoms, high Beighton index     Goals:   Short Term Goals (6 Weeks):   1. Pt will be independent with HEP to supplement PT in improving functional use of R UE. (Progressing)  2. Pt will display full and pain free shoulder flexion and abduction ROM to demonstrate improved GH joint mechanics. (Progressing)  3. Pt will increase R UE strength by 1/2 MMT grade to demonstrate improved functional strength. (Progressing)  Long Term Goals (12 Weeks):   1. Pt will improve FOTO score to </=75% to decrease perceived limitation with carrying, moving, and handling objects.(Progressing)  2. Pt will be able to lift 15# overhead without pain to demonstrate improved overhead strength. (Progressing)  3. Pt will increase R UE strength by 1 MMT grade to demonstrate improved functional strength. (Progressing)  4. Pt will be able to perform all dance activities without pain to demonstrate improved overall QOL. (Progressing)  5. Pt will be independent with gym routine to demonstrate improved ability to maintain strength outside of the clinic. (Progressing)    Plan     Continue to progress per ESTEVAN ZARAGOZA, PT

## 2023-12-20 ENCOUNTER — CLINICAL SUPPORT (OUTPATIENT)
Dept: REHABILITATION | Facility: HOSPITAL | Age: 28
End: 2023-12-20
Payer: COMMERCIAL

## 2023-12-20 DIAGNOSIS — M89.9 SCAPULAR DYSFUNCTION: ICD-10-CM

## 2023-12-20 DIAGNOSIS — R29.3 POSTURE ABNORMALITY: Primary | ICD-10-CM

## 2023-12-20 DIAGNOSIS — R29.898 WEAKNESS OF RIGHT UPPER EXTREMITY: ICD-10-CM

## 2023-12-20 PROCEDURE — 97112 NEUROMUSCULAR REEDUCATION: CPT

## 2023-12-20 PROCEDURE — 97110 THERAPEUTIC EXERCISES: CPT

## 2023-12-20 PROCEDURE — 97530 THERAPEUTIC ACTIVITIES: CPT

## 2023-12-20 NOTE — PROGRESS NOTES
"  Physical Therapy Daily Treatment Note     Name: Valerie Cole  Clinic Number: [id number]    Therapy Diagnosis:   Encounter Diagnoses   Name Primary?    Posture abnormality Yes    Weakness of right upper extremity     Scapular dysfunction        Physician: Milagro Alexander*    Visit Date: 12/20/2023    Physician Orders: PT Eval and Treat  Medical Diagnosis from Referral: M25.50 (ICD-10-CM) - Arthralgia, unspecified joint  Evaluation Date: 11/10/2023  Authorization Period Expiration: 12/31/2023  Plan of Care Expiration: 2/2/2024  Progress Note Due: 12/9/2023  Visit # / Visits authorized: 5/ 12  FOTO: 1/ 3    1st FOTO Follow up:   2nd FOTO Follow up:     Time In: 303pm  Time Out: 403pm  Total Billable Time: 60 minutes    Precautions: Standard    Subjective     Pt reports: that overall her shoulder held up well over the past week. She got  over the weekend and did not have any trouble with the shoulder at any point.   She was compliant with home exercise program.  Response to previous treatment: no change  Functional change: ongoing    Pain: 2/10  Location: right shoulder (deep inside)      Objective     Valerie received therapeutic exercises to develop strength, endurance, and ROM for 10 minutes including:    SL ER with 3#, 3 x 8 with 3" holds  Prone mid trap level 3, 3 x 8  Prone low trap level 3, 3 x 8    Valerie received the following manual therapy techniques: Joint mobilizations were applied to the: shoulder and thoracic spine for 0 minutes, including:      Valerie participated in neuromuscular re-education activities to improve: Coordination, Kinesthetic, Sense, and Proprioception for 40 minutes. The following activities were included:    Joint centration with ER and IR isometrics, 2 x 10 each with 3" holds  Rhythmic stabilization from PT, 2 x 30" each with arm at 90 and 120 degrees flexion  Prone shoulder ER and IR with 2#, towel under anterior shoulder for stability, 3 x 8  ER and IR " "walkouts with RTB, 2 x 10 with 3" holds   Ball circles on the wall, clockwise and counter clockwise, 3 x 30" each direction  Hand heel rocks, 3 x 8  Serratus wall slides with YTB around wrists, 3 x 8  Self rhythmic stabilization with YTB around wrists, arm by the side, 3 x 30"  Robots on the wall with YTB around wrists, 2 x 8    Valerie participated in dynamic functional therapeutic activities to improve functional performance for 10  minutes, including:    Modified pushup position on chair, alternating shoulder taps, 3 x 8   carries with 4#, 20 yards x5    Home Exercises Provided and Patient Education Provided     Education provided:   - HEP  - Plan of care  - Excessive mobility in multiple joints  - importance of improving strength and stability around the shoulder    Written Home Exercises Provided: Patient instructed to cont prior HEP.  Exercises were reviewed and Valerie was able to demonstrate them prior to the end of the session.  Valerie demonstrated good  understanding of the education provided.     See EMR under Patient Instructions for exercises provided  11/10/2023 .    Assessment     Valerie continues to be challenged with manual rotation cuff activation exercises to help improve her joint centration before more functional activities are performed. She is appropriately fatigued with rotator cuff strengthening exercises at this time. Closed chain activities were slightly progressed today to a full push up position with good tolerance.     Valerie Is progressing well towards her goals.   Pt prognosis is Good.     Pt will continue to benefit from skilled outpatient physical therapy to address the deficits listed in the problem list box on initial evaluation, provide pt/family education and to maximize pt's level of independence in the home and community environment.     Pt's spiritual, cultural and educational needs considered and pt agreeable to plan of care and goals.     Anticipated barriers " to physical therapy: chronicity of symptoms, high Beighton index     Goals:   Short Term Goals (6 Weeks):   1. Pt will be independent with HEP to supplement PT in improving functional use of R UE. (Progressing)  2. Pt will display full and pain free shoulder flexion and abduction ROM to demonstrate improved GH joint mechanics. (Progressing)  3. Pt will increase R UE strength by 1/2 MMT grade to demonstrate improved functional strength. (Progressing)  Long Term Goals (12 Weeks):   1. Pt will improve FOTO score to </=75% to decrease perceived limitation with carrying, moving, and handling objects.(Progressing)  2. Pt will be able to lift 15# overhead without pain to demonstrate improved overhead strength. (Progressing)  3. Pt will increase R UE strength by 1 MMT grade to demonstrate improved functional strength. (Progressing)  4. Pt will be able to perform all dance activities without pain to demonstrate improved overall QOL. (Progressing)  5. Pt will be independent with gym routine to demonstrate improved ability to maintain strength outside of the clinic. (Progressing)    Plan     Continue to progress per ESTEVAN ZARAGOZA, PT

## 2023-12-28 ENCOUNTER — CLINICAL SUPPORT (OUTPATIENT)
Dept: REHABILITATION | Facility: HOSPITAL | Age: 28
End: 2023-12-28
Payer: COMMERCIAL

## 2023-12-28 DIAGNOSIS — M89.9 SCAPULAR DYSFUNCTION: ICD-10-CM

## 2023-12-28 DIAGNOSIS — R29.3 POSTURE ABNORMALITY: Primary | ICD-10-CM

## 2023-12-28 DIAGNOSIS — R29.898 WEAKNESS OF RIGHT UPPER EXTREMITY: ICD-10-CM

## 2023-12-28 PROCEDURE — 97530 THERAPEUTIC ACTIVITIES: CPT

## 2023-12-28 PROCEDURE — 97112 NEUROMUSCULAR REEDUCATION: CPT

## 2023-12-28 PROCEDURE — 97110 THERAPEUTIC EXERCISES: CPT

## 2023-12-28 NOTE — PROGRESS NOTES
Physical Therapy Daily Treatment Note / Progress Note     Name: Valerie Cole  Clinic Number: [id number]    Therapy Diagnosis:   Encounter Diagnoses   Name Primary?    Posture abnormality Yes    Weakness of right upper extremity     Scapular dysfunction        Physician: Milagro Alexander*    Visit Date: 12/28/2023    Physician Orders: PT Eval and Treat  Medical Diagnosis from Referral: M25.50 (ICD-10-CM) - Arthralgia, unspecified joint  Evaluation Date: 11/10/2023  Authorization Period Expiration: 12/31/2023  Plan of Care Expiration: 2/2/2024  Progress Note Due: 1/15/2023  Visit # / Visits authorized: 6/ 12  FOTO: 1/ 3    1st FOTO Follow up: 12/28/2023   2nd FOTO Follow up:     Time In: 210pm  Time Out: 300pm  Total Billable Time: 50 minutes    Precautions: Standard    Subjective     Pt reports: that she was sore after last week for about 1 day but not longer than that. The shoulder has been feeling good over the past week. No pain at this time. Still feels like there are something that she us unable to do throughout her day.  She was compliant with home exercise program.  Response to previous treatment: no change  Functional change: ongoing    Pain: 2/10  Location: right shoulder (deep inside)      Objective     Passive Range of Motion (degrees):   Shoulder Right Left   Flexion 180 180   Abduction 180 180   ER at 0 80  70   ER at 45 90  80   ER at 90 120 (pain at end range) 110      Active Range of Motion (degrees):   Shoulder Right Left   Flexion 180 180   Abduction 180 (pain at end range) 180   ER at 0 80 70         Upper Extremity Strength  (R) UE   (L) UE     Shoulder ER 3+/5 Shoulder ER 4/5   Shoulder IR 3+/5 Shoulder IR 4/5   Lower Trap 3-/5 Lower Trap 3+/5   Middle Trap 3-/5 Middle Trap 3+/5      Special Tests:     Instability:       Right Left   Sulcus Sign +  +   Anterior Apprehension Test + -   Relocation test + -   Load and shift test + (anteriorly) -        Valerie received therapeutic  "exercises to develop strength, endurance, and ROM for 10 minutes including:    Assessment as above  Prone ER and IR with 3#, 3 x 8 with 3" holds    Valerie received the following manual therapy techniques: Joint mobilizations were applied to the: shoulder and thoracic spine for 0 minutes, including:      Valerie participated in neuromuscular re-education activities to improve: Coordination, Kinesthetic, Sense, and Proprioception for 30 minutes. The following activities were included:    Joint centration with ER and IR isometrics, 1x0 each with 3" holds  Rhythmic stabilization from PT, 2 x 30" each with arm at 90 and 120 degrees flexion  ER and IR walkouts with GTB, 2 x 10 with 3" holds   Hand heel rocks, 3 x 8  Prone mid trap level 3 on stability ball, 2 x 10  Prone low trap level 3 on stability ball, 2 x 10  Serratus wall slides with YTB around wrists, 3 x 8  Self rhythmic stabilization with YTB around wrists, arm by the side, at 90 and 120, 2 x 30" each    Not today:  Ball circles on the wall, clockwise and counter clockwise, 3 x 30" each direction  Robots on the wall with YTB around wrists, 2 x 8    Valerie participated in dynamic functional therapeutic activities to improve functional performance for 10  minutes, including:    Modified pushup position on chair, alternating shoulder taps, 3 x 8   carries with 4#, 20 yards x5    Home Exercises Provided and Patient Education Provided     Education provided:   - HEP  - Plan of care  - Excessive mobility in multiple joints  - importance of improving strength and stability around the shoulder    Written Home Exercises Provided: Patient instructed to cont prior HEP.  Exercises were reviewed and Valerie was able to demonstrate them prior to the end of the session.  Valerie demonstrated good  understanding of the education provided.     See EMR under Patient Instructions for exercises provided  11/10/2023 .    Assessment     Valerie has been treated for a " total of 7 visits over the past 6 weeks to address her complaints of R shoulder pain. Since the onset of her treatment her passive and active ROM has become a bit less painful but is still bothersome in certain directions. Her activation ROM is painful at her end range of abduction only. She remains with scapular and rotator cuff weakness at this time. She also is still displaying + instability signs at this time. Functional she continues with pain during lifting activities and overhead movements with resistance. At this time Valerie remains a great candidate for skilled PT services at this time.     Valerie Is progressing well towards her goals.   Pt prognosis is Good.     Pt will continue to benefit from skilled outpatient physical therapy to address the deficits listed in the problem list box on initial evaluation, provide pt/family education and to maximize pt's level of independence in the home and community environment.     Pt's spiritual, cultural and educational needs considered and pt agreeable to plan of care and goals.     Anticipated barriers to physical therapy: chronicity of symptoms, high Beighton index     Goals:   Short Term Goals (6 Weeks):   1. Pt will be independent with HEP to supplement PT in improving functional use of R UE. (MET)  2. Pt will display full and pain free shoulder flexion and abduction ROM to demonstrate improved GH joint mechanics. (Progressing)  3. Pt will increase R UE strength by 1/2 MMT grade to demonstrate improved functional strength. (Progressing)  Long Term Goals (12 Weeks):   1. Pt will improve FOTO score to </=75% to decrease perceived limitation with carrying, moving, and handling objects.(Progressing)  2. Pt will be able to lift 15# overhead without pain to demonstrate improved overhead strength. (Progressing)  3. Pt will increase R UE strength by 1 MMT grade to demonstrate improved functional strength. (Progressing)  4. Pt will be able to perform all dance  activities without pain to demonstrate improved overall QOL. (Progressing)  5. Pt will be independent with gym routine to demonstrate improved ability to maintain strength outside of the clinic. (Progressing)    Plan     Continue to progress per ESTEVAN ZARAGOZA PT

## 2024-01-02 ENCOUNTER — OFFICE VISIT (OUTPATIENT)
Dept: DERMATOLOGY | Facility: CLINIC | Age: 29
End: 2024-01-02
Payer: COMMERCIAL

## 2024-01-02 VITALS — BODY MASS INDEX: 21.9 KG/M2 | WEIGHT: 153 LBS | HEIGHT: 70 IN

## 2024-01-02 DIAGNOSIS — D22.9 MULTIPLE BENIGN NEVI: ICD-10-CM

## 2024-01-02 DIAGNOSIS — L30.9 FACIAL ECZEMA: Primary | ICD-10-CM

## 2024-01-02 DIAGNOSIS — D17.9 MULTIPLE LIPOMAS: ICD-10-CM

## 2024-01-02 DIAGNOSIS — L01.00 IMPETIGO: ICD-10-CM

## 2024-01-02 DIAGNOSIS — L85.3 XEROSIS CUTIS: ICD-10-CM

## 2024-01-02 PROCEDURE — 99214 OFFICE O/P EST MOD 30 MIN: CPT | Mod: S$GLB,,, | Performed by: DERMATOLOGY

## 2024-01-02 PROCEDURE — 3008F BODY MASS INDEX DOCD: CPT | Mod: CPTII,S$GLB,, | Performed by: DERMATOLOGY

## 2024-01-02 PROCEDURE — 1159F MED LIST DOCD IN RCRD: CPT | Mod: CPTII,S$GLB,, | Performed by: DERMATOLOGY

## 2024-01-02 PROCEDURE — 1160F RVW MEDS BY RX/DR IN RCRD: CPT | Mod: CPTII,S$GLB,, | Performed by: DERMATOLOGY

## 2024-01-02 RX ORDER — TACROLIMUS 1 MG/G
OINTMENT TOPICAL
Qty: 100 G | Refills: 2 | Status: SHIPPED | OUTPATIENT
Start: 2024-01-02

## 2024-01-02 RX ORDER — MUPIROCIN 20 MG/G
OINTMENT TOPICAL
Qty: 22 G | Refills: 1 | Status: SHIPPED | OUTPATIENT
Start: 2024-01-02

## 2024-01-02 NOTE — PROGRESS NOTES
Subjective:      Patient ID:  Valerie Cole is a 28 y.o. female who presents for   Chief Complaint   Patient presents with    Dry Skin    Skin Check     [facility]- [facility]- Dr. Garcia -facial eczema  Elidel triggered burning and worsening of the rash    Here today for a TBSC  C/o dry skin all over- especially face and elbow crease- uses vanicream which helps  Follows a sensitive skin care routine  C/o lumps under her skin- different spots of the body-some are painful x 10 years  Mother with similar issue, less numerous    Has no hx of NMSC  Has no fhx of MM    Current Outpatient Medications:   ·  amitriptyline (ELAVIL) 10 MG tablet, Take 2 pills approx 2 hours before bed, Disp: 60 tablet, Rfl: 6  ·  cetirizine (ZYRTEC) 10 MG tablet, Take 1 tablet (10 mg total) by mouth once daily. for 10 days, Disp: 10 tablet, Rfl: 0  ·  ergocalciferol, vitamin D2, (VITAMIN D ORAL), Take by mouth., Disp: , Rfl:   ·  Lactobac no.41/Bifidobact no.7 (PROBIOTIC-10 ORAL), Take by mouth., Disp: , Rfl:   ·  multivitamin capsule, Take 1 capsule by mouth once daily., Disp: , Rfl:   ·  OMEPRAZOLE ORAL, Take by mouth., Disp: , Rfl:   ·  ondansetron (ZOFRAN-ODT) 4 MG TbDL, Dissolve 1 tablet (4 mg total) by mouth every 8 (eight) hours as needed (nausea)., Disp: 30 tablet, Rfl: 0  ·  predniSONE (DELTASONE) 20 MG tablet, On full stomach (breakfast): 3 tabs for 2 days, 2 tabs for 2 days, 1 tab for 2 days. Finish, Disp: 12 tablet, Rfl: 0  ·  rimegepant sulfate (NURTEC ODT ORAL), Take by mouth., Disp: , Rfl:         Review of Systems   Constitutional:  Negative for fever, chills and fatigue.   Respiratory:  Negative for cough and shortness of breath.    Gastrointestinal:  Negative for nausea and vomiting.   Skin:  Positive for dry skin and activity-related sunscreen use. Negative for daily sunscreen use and wears hat.   Hematologic/Lymphatic: Bruises/bleeds easily.       Objective:   Physical Exam   Constitutional: She appears well-developed  and well-nourished. No distress.   Neurological: She is alert and oriented to person, place, and time. She is not disoriented.   Psychiatric: She has a normal mood and affect.   Skin:   Areas Examined (abnormalities noted in diagram):   Scalp / Hair Palpated and Inspected  Head / Face Inspection Performed  Neck Inspection Performed  Chest / Axilla Inspection Performed  Abdomen Inspection Performed  Genitals / Buttocks / Groin Inspection Performed  Back Inspection Performed  RUE Inspected  LUE Inspection Performed  RLE Inspected  LLE Inspection Performed  Nails and Digits Inspection Performed                 Diagram Legend     Erythematous scaling macule/papule c/w actinic keratosis       Vascular papule c/w angioma      Pigmented verrucoid papule/plaque c/w seborrheic keratosis      Yellow umbilicated papule c/w sebaceous hyperplasia      Irregularly shaped tan macule c/w lentigo     1-2 mm smooth white papules consistent with Milia      Movable subcutaneous cyst with punctum c/w epidermal inclusion cyst      Subcutaneous movable cyst c/w pilar cyst      Firm pink to brown papule c/w dermatofibroma      Pedunculated fleshy papule(s) c/w skin tag(s)      Evenly pigmented macule c/w junctional nevus     Mildly variegated pigmented, slightly irregular-bordered macule c/w mildly atypical nevus      Flesh colored to evenly pigmented papule c/w intradermal nevus       Pink pearly papule/plaque c/w basal cell carcinoma      Erythematous hyperkeratotic cursted plaque c/w SCC      Surgical scar with no sign of skin cancer recurrence      Open and closed comedones      Inflammatory papules and pustules      Verrucoid papule consistent consistent with wart     Erythematous eczematous patches and plaques     Dystrophic onycholytic nail with subungual debris c/w onychomycosis     Umbilicated papule    Erythematous-base heme-crusted tan verrucoid plaque consistent with inflamed seborrheic keratosis     Erythematous Silvery  Scaling Plaque c/w Psoriasis     See annotation      Assessment / Plan:        Facial eczema  -     tacrolimus (PROTOPIC) 0.1 % ointment; Apply to affected areas of face BID prn eczema/rash.  Dispense: 100 g; Refill: 2  Facial cleanser  Uses vanicream moisturizer, ok to continue  Prone to perioral derm, avoid steroid use    Xerosis cutis  Cerave cream after shower daily    Multiple benign nevi  Careful dermoscopy evaluation of nevi performed with none identified as needing biopsy today  Monitor for new mole or moles that are becoming bigger, darker, irritated, or developing irregular borders.     Multiple lipomas  FH of same in mother  Few lesions bothersome, desires excision, schedule E&S]    Impetigo- inflamed impetiginized R cartilage piercing    -     mupirocin (BACTROBAN) 2 % ointment; Apply to piercing sites BID  Dispense: 22 g; Refill: 1  BP wash to area daily in shower    BENZOYL PEROXIDE WASH OVER THE COUNTER    We prefer:  - NEUTROGENA CLEAR PORE mask/cleanser  or  - CERAVE acne foaming cream cleanser   Or  - cetaphil gentle clear BPO wash  Use small pea size amount, massage on face, chest, and back, rinse well. This ingredient may bleach dark clothing, avoid direct contact of suds with fabrics (best used in the shower)             No follow-ups on file.

## 2024-01-03 ENCOUNTER — CLINICAL SUPPORT (OUTPATIENT)
Dept: REHABILITATION | Facility: HOSPITAL | Age: 29
End: 2024-01-03
Payer: COMMERCIAL

## 2024-01-03 DIAGNOSIS — M89.9 SCAPULAR DYSFUNCTION: ICD-10-CM

## 2024-01-03 DIAGNOSIS — R29.898 WEAKNESS OF RIGHT UPPER EXTREMITY: ICD-10-CM

## 2024-01-03 DIAGNOSIS — R29.3 POSTURE ABNORMALITY: Primary | ICD-10-CM

## 2024-01-03 PROCEDURE — 97112 NEUROMUSCULAR REEDUCATION: CPT

## 2024-01-03 PROCEDURE — 97110 THERAPEUTIC EXERCISES: CPT

## 2024-01-03 NOTE — PROGRESS NOTES
"  Physical Therapy Daily Treatment Note      Name: Valerie Cole  Clinic Number: [id number]    Therapy Diagnosis:   Encounter Diagnoses   Name Primary?    Posture abnormality Yes    Weakness of right upper extremity     Scapular dysfunction      Physician: Milagro Alexander*    Visit Date: 1/3/2024    Physician Orders: PT Eval and Treat  Medical Diagnosis from Referral: M25.50 (ICD-10-CM) - Arthralgia, unspecified joint  Evaluation Date: 11/10/2023  Authorization Period Expiration: 12/31/2023  Plan of Care Expiration: 2/2/2024  Progress Note Due: 1/15/2023  Visit # / Visits authorized: 1/20  FOTO: 1/ 3    1st FOTO Follow up: 12/28/2023   2nd FOTO Follow up:     Time In: 306pm  Time Out: 356pm  Total Billable Time: 45 minutes    Precautions: Standard    Subjective     Pt reports: that she was reaching up to grab a sweater in her closet on Monday when her R shoulder "gave out" and locked up on her. This caused a lot of pain which has improved a bit but is still present at this time. She is having a good amount of soreness today and her motion is still painful.   She was compliant with home exercise program.  Response to previous treatment: no change  Functional change: ongoing    Pain: 2/10  Location: right shoulder (deep inside)      Objective     Valerie received therapeutic exercises to develop strength, endurance, and ROM for 10 minutes including:    Sie lying ER with no weight, 3 x 8 with 5" holds  B shoulder ER with YTB  Prone ER and IR with 3#, 3 x 8 with 3" holds (not today)    Valerie received the following manual therapy techniques: Joint mobilizations were applied to the: shoulder and thoracic spine for 0 minutes, including:      Valerie participated in neuromuscular re-education activities to improve: Coordination, Kinesthetic, Sense, and Proprioception for 30 minutes. The following activities were included:    Joint centration with ER and IR isometrics, 10x each with 3" holds (held after 1 set " "due to pain)  Rhythmic stabilization from PT, 2 x 30" each with arm at 90 and 120 degrees flexion  ER and IR walkouts with GTB, 2 x 10 with 3" holds   Side lying serratus punches with arm on dowel tiffany, 3 x 8  Side lying shoulder flexion with arm on dowel tiffany, 3 x 8  Serratus wall slides, 3 x 8    Hand heel rocks, 3 x 8  Prone mid trap level 3 on stability ball, 2 x 10  Prone low trap level 3 on stability ball, 2 x 10  Self rhythmic stabilization with YTB around wrists, arm by the side, at 90 and 120, 2 x 30" each    Not today:  Ball circles on the wall, clockwise and counter clockwise, 3 x 30" each direction  Robots on the wall with YTB around wrists, 2 x 8    Valerie participated in dynamic functional therapeutic activities to improve functional performance for 00  minutes, including:    Modified pushup position on chair, alternating shoulder taps, 3 x 8   carries with 4#, 20 yards x5    Home Exercises Provided and Patient Education Provided     Education provided:   - HEP  - Plan of care  - Excessive mobility in multiple joints  - importance of improving strength and stability around the shoulder    Written Home Exercises Provided: Patient instructed to cont prior HEP.  Exercises were reviewed and Valerie was able to demonstrate them prior to the end of the session.  Valerie demonstrated good  understanding of the education provided.     See EMR under Patient Instructions for exercises provided  11/10/2023 .    Assessment     Valerie presents today with increased reports of shoulder pain at this time after reaching overhead and flaring her shoulder up. Her shoulder flexion and abduction were both painful today. Her GH joint mobility remain hypermobile in the posterior and inferior directions at this time and she had trouble tolerating mobilizations in any direction. Joint centration with GH isometrics was painful today so they were held after 1 set. She did tolerate rhythmic stabilization and walkouts " well today so these were pursued. Her exercises were kept within her tolerance today to avoid over loading her shoulder and further exacerbating her symptoms. She was educated on activity modification at home to use her L UE a bit more with dailty activities to avoid pain in her R shoulder.     Valerie Is progressing well towards her goals.   Pt prognosis is Good.     Pt will continue to benefit from skilled outpatient physical therapy to address the deficits listed in the problem list box on initial evaluation, provide pt/family education and to maximize pt's level of independence in the home and community environment.     Pt's spiritual, cultural and educational needs considered and pt agreeable to plan of care and goals.     Anticipated barriers to physical therapy: chronicity of symptoms, high Beighton index     Goals:   Short Term Goals (6 Weeks):   1. Pt will be independent with HEP to supplement PT in improving functional use of R UE. (MET)  2. Pt will display full and pain free shoulder flexion and abduction ROM to demonstrate improved GH joint mechanics. (Progressing)  3. Pt will increase R UE strength by 1/2 MMT grade to demonstrate improved functional strength. (Progressing)  Long Term Goals (12 Weeks):   1. Pt will improve FOTO score to </=75% to decrease perceived limitation with carrying, moving, and handling objects.(Progressing)  2. Pt will be able to lift 15# overhead without pain to demonstrate improved overhead strength. (Progressing)  3. Pt will increase R UE strength by 1 MMT grade to demonstrate improved functional strength. (Progressing)  4. Pt will be able to perform all dance activities without pain to demonstrate improved overall QOL. (Progressing)  5. Pt will be independent with gym routine to demonstrate improved ability to maintain strength outside of the clinic. (Progressing)    Plan     Continue to progress per ESTEVAN ZARAGOZA, PT

## 2024-01-10 ENCOUNTER — CLINICAL SUPPORT (OUTPATIENT)
Dept: REHABILITATION | Facility: HOSPITAL | Age: 29
End: 2024-01-10
Payer: COMMERCIAL

## 2024-01-10 DIAGNOSIS — M89.9 SCAPULAR DYSFUNCTION: ICD-10-CM

## 2024-01-10 DIAGNOSIS — R29.3 POSTURE ABNORMALITY: Primary | ICD-10-CM

## 2024-01-10 DIAGNOSIS — R29.898 WEAKNESS OF RIGHT UPPER EXTREMITY: ICD-10-CM

## 2024-01-10 PROCEDURE — 97112 NEUROMUSCULAR REEDUCATION: CPT

## 2024-01-10 PROCEDURE — 97110 THERAPEUTIC EXERCISES: CPT

## 2024-01-10 NOTE — PROGRESS NOTES
"  Physical Therapy Daily Treatment Note      Name: Valerie Cole  Clinic Number: [id number]    Therapy Diagnosis:   Encounter Diagnoses   Name Primary?    Posture abnormality Yes    Weakness of right upper extremity     Scapular dysfunction        Physician: Milagro Alexander*    Visit Date: 1/10/2024    Physician Orders: PT Eval and Treat  Medical Diagnosis from Referral: M25.50 (ICD-10-CM) - Arthralgia, unspecified joint  Evaluation Date: 11/10/2023  Authorization Period Expiration: 12/31/2024  Plan of Care Expiration: 2/2/2024  Progress Note Due: 1/15/2023  Visit # / Visits authorized: 2/20  FOTO: 1/ 3    1st FOTO Follow up: 12/28/2023   2nd FOTO Follow up:     Time In: 305pm  Time Out: 355pm  Total Billable Time: 50 minutes    Precautions: Standard    Subjective     Pt reports: that her pain has improved since her set back last week but it is not completely resolved. She still have pain with lifting her arm over head but it is of low intensity.   She was compliant with home exercise program.  Response to previous treatment: no change  Functional change: ongoing    Pain: 2/10  Location: right shoulder (deep inside)      Objective     Valerie received therapeutic exercises to develop strength, endurance, and ROM for 10 minutes including:    Side lying ER with 2#, 3 x 8 with 5" holds  Seated assisted shoulder flexion with cable column, 5#, 3 x 8  Prone ER and IR with 3#, 3 x 8 with 3" holds (not today)    Valerie received the following manual therapy techniques: Joint mobilizations were applied to the: shoulder and thoracic spine for 0 minutes, including:      Valerie participated in neuromuscular re-education activities to improve: Coordination, Kinesthetic, Sense, and Proprioception for 40 minutes. The following activities were included:    Joint centration with ER and IR isometrics, 10x each with 3" holds (held after 1 set due to pain)  Rhythmic stabilization from PT, 2 x 30" each with arm at 90 and " "120 degrees flexion  ER and IR walkouts with RTB, 2 x 10 with 3" holds   Side lying shoulder flexion + serratus punch, arm on dowel tiffany, 3 x 8  Serratus wall slides, 3 x 8  B shoulder ER with YTB, 3 x 8  Hand heel rocks, 3 x 8    Not today:  Prone mid trap level 3 on stability ball, 2 x 10  Prone low trap level 3 on stability ball, 2 x 10  Self rhythmic stabilization with YTB around wrists, arm by the side, at 90 and 120, 2 x 30" each    Not today:  Ball circles on the wall, clockwise and counter clockwise, 3 x 30" each direction  Robots on the wall with YTB around wrists, 2 x 8    Valerie participated in dynamic functional therapeutic activities to improve functional performance for 00  minutes, including:    Modified pushup position on chair, alternating shoulder taps, 3 x 8   carries with 4#, 20 yards x5    Home Exercises Provided and Patient Education Provided     Education provided:   - HEP  - Plan of care  - Excessive mobility in multiple joints  - importance of improving strength and stability around the shoulder    Written Home Exercises Provided: Patient instructed to cont prior HEP.  Exercises were reviewed and Valerie was able to demonstrate them prior to the end of the session.  Valerie demonstrated good  understanding of the education provided.     See EMR under Patient Instructions for exercises provided  11/10/2023 .    Assessment     Valerie is displaying improved symptoms today compared to last week as her irritability level has decreased over the past week. She continues to be challenged with rotator cuff and scapular control within her tolerance. She was progressed a bit more today with overhead movements and cuff loading as her symptoms were more controlled today. She was able to tolerate everything well today an without an increase in her pain.      Valerie Is progressing well towards her goals.   Pt prognosis is Good.     Pt will continue to benefit from skilled outpatient physical " therapy to address the deficits listed in the problem list box on initial evaluation, provide pt/family education and to maximize pt's level of independence in the home and community environment.     Pt's spiritual, cultural and educational needs considered and pt agreeable to plan of care and goals.     Anticipated barriers to physical therapy: chronicity of symptoms, high Beighton index     Goals:   Short Term Goals (6 Weeks):   1. Pt will be independent with HEP to supplement PT in improving functional use of R UE. (MET)  2. Pt will display full and pain free shoulder flexion and abduction ROM to demonstrate improved GH joint mechanics. (Progressing)  3. Pt will increase R UE strength by 1/2 MMT grade to demonstrate improved functional strength. (Progressing)  Long Term Goals (12 Weeks):   1. Pt will improve FOTO score to </=75% to decrease perceived limitation with carrying, moving, and handling objects.(Progressing)  2. Pt will be able to lift 15# overhead without pain to demonstrate improved overhead strength. (Progressing)  3. Pt will increase R UE strength by 1 MMT grade to demonstrate improved functional strength. (Progressing)  4. Pt will be able to perform all dance activities without pain to demonstrate improved overall QOL. (Progressing)  5. Pt will be independent with gym routine to demonstrate improved ability to maintain strength outside of the clinic. (Progressing)    Plan     Continue to progress per ESTEVAN ZARAGOZA, PT

## 2024-01-17 ENCOUNTER — CLINICAL SUPPORT (OUTPATIENT)
Dept: REHABILITATION | Facility: HOSPITAL | Age: 29
End: 2024-01-17
Payer: COMMERCIAL

## 2024-01-17 DIAGNOSIS — R29.3 POSTURE ABNORMALITY: Primary | ICD-10-CM

## 2024-01-17 DIAGNOSIS — R29.898 WEAKNESS OF RIGHT UPPER EXTREMITY: ICD-10-CM

## 2024-01-17 DIAGNOSIS — M89.9 SCAPULAR DYSFUNCTION: ICD-10-CM

## 2024-01-17 PROCEDURE — 97110 THERAPEUTIC EXERCISES: CPT

## 2024-01-17 PROCEDURE — 97112 NEUROMUSCULAR REEDUCATION: CPT

## 2024-01-17 NOTE — PROGRESS NOTES
"  Physical Therapy Daily Treatment Note      Name: Valerie Cole  Clinic Number: [id number]    Therapy Diagnosis:   Encounter Diagnoses   Name Primary?    Posture abnormality Yes    Weakness of right upper extremity     Scapular dysfunction      Physician: Milagro Alexander*    Visit Date: 1/17/2024    Physician Orders: PT Eval and Treat  Medical Diagnosis from Referral: M25.50 (ICD-10-CM) - Arthralgia, unspecified joint  Evaluation Date: 11/10/2023  Authorization Period Expiration: 12/31/2024  Plan of Care Expiration: 2/2/2024  Progress Note Due: 1/15/2023  Visit # / Visits authorized: 3/20  FOTO: 1/ 3    1st FOTO Follow up: 12/28/2023   2nd FOTO Follow up:     Time In: 302pm  Time Out: 400pm  Total Billable Time: 53 minutes    Precautions: Standard    Subjective     Pt reports: that her shoulder pain continues to improve following her set back a few weeks ago. There is occasionally pain with reaching over her head but this is not near as bad as it has been in the past.  She was compliant with home exercise program.  Response to previous treatment: no change  Functional change: ongoing    Pain: 2/10  Location: right shoulder (deep inside)      Objective     Valerie received therapeutic exercises to develop strength, endurance, and ROM for 13 minutes including:    Side lying ER with 2#, 3 x 8 with 5" holds  Seated assisted shoulder flexion with cable column, 5#, 3 x 8  Prone ER and IR, 3 x 8 with 3" holds    Valerie received the following manual therapy techniques: Joint mobilizations were applied to the: shoulder and thoracic spine for 0 minutes, including:      Valerie participated in neuromuscular re-education activities to improve: Coordination, Kinesthetic, Sense, and Proprioception for 40 minutes. The following activities were included:    Joint centration with ER and IR isometrics, 10x each with 3" holds (held after 1 set due to pain)  Rhythmic stabilization from PT, 2 x 30" each with arm at 90 and " "120 degrees flexion  ER and IR walkouts with RTB, 2 x 10 with 3" holds   Self rhythmic stabilization with YTB around wrists, arm by the side, 3 x 30"  Side lying shoulder flexion, 3 x 8  Serratus wall slides, 3 x 8  B shoulder ER with YTB, 3 x 8  Hand heel rocks, 3 x 8    Not today:  Prone mid trap level 3 on stability ball, 2 x 10  Prone low trap level 3 on stability ball, 2 x 10    Not today:  Ball circles on the wall, clockwise and counter clockwise, 3 x 30" each direction  Robots on the wall with YTB around wrists, 2 x 8    Valerie participated in dynamic functional therapeutic activities to improve functional performance for 00  minutes, including:    Modified pushup position on chair, alternating shoulder taps, 3 x 8   carries with 4#, 20 yards x5    Home Exercises Provided and Patient Education Provided     Education provided:   - HEP  - Plan of care  - Excessive mobility in multiple joints  - importance of improving strength and stability around the shoulder    Written Home Exercises Provided: Patient instructed to cont prior HEP.  Exercises were reviewed and Valerie was able to demonstrate them prior to the end of the session.  Valerie demonstrated good  understanding of the education provided.     See EMR under Patient Instructions for exercises provided  11/10/2023 .    Assessment     Valerie's symptoms continue to stabilize following her set back a few weeks ago that resulted from her reaching over he head abruptly. She continues to be challenged with rotator cuff activation to help maximize her joint stability. She was reintroduced to some higher level strengthening exercises that were held previously. She tolerated all exercises well today and without any increase in symptoms.     Valerie Is progressing well towards her goals.   Pt prognosis is Good.     Pt will continue to benefit from skilled outpatient physical therapy to address the deficits listed in the problem list box on initial " evaluation, provide pt/family education and to maximize pt's level of independence in the home and community environment.     Pt's spiritual, cultural and educational needs considered and pt agreeable to plan of care and goals.     Anticipated barriers to physical therapy: chronicity of symptoms, high Beighton index     Goals:   Short Term Goals (6 Weeks):   1. Pt will be independent with HEP to supplement PT in improving functional use of R UE. (MET)  2. Pt will display full and pain free shoulder flexion and abduction ROM to demonstrate improved GH joint mechanics. (Progressing)  3. Pt will increase R UE strength by 1/2 MMT grade to demonstrate improved functional strength. (Progressing)  Long Term Goals (12 Weeks):   1. Pt will improve FOTO score to </=75% to decrease perceived limitation with carrying, moving, and handling objects.(Progressing)  2. Pt will be able to lift 15# overhead without pain to demonstrate improved overhead strength. (Progressing)  3. Pt will increase R UE strength by 1 MMT grade to demonstrate improved functional strength. (Progressing)  4. Pt will be able to perform all dance activities without pain to demonstrate improved overall QOL. (Progressing)  5. Pt will be independent with gym routine to demonstrate improved ability to maintain strength outside of the clinic. (Progressing)    Plan     Continue to progress per ESTEVAN ZARAGOZA, PT

## 2024-01-18 ENCOUNTER — OFFICE VISIT (OUTPATIENT)
Dept: NEUROLOGY | Facility: CLINIC | Age: 29
End: 2024-01-18
Payer: COMMERCIAL

## 2024-01-18 VITALS
TEMPERATURE: 98 F | HEIGHT: 70 IN | RESPIRATION RATE: 17 BRPM | SYSTOLIC BLOOD PRESSURE: 126 MMHG | BODY MASS INDEX: 22.42 KG/M2 | WEIGHT: 156.63 LBS | HEART RATE: 84 BPM | DIASTOLIC BLOOD PRESSURE: 73 MMHG

## 2024-01-18 DIAGNOSIS — G43.909 EPISODIC MIGRAINE: Primary | ICD-10-CM

## 2024-01-18 DIAGNOSIS — M25.50 ARTHRALGIA, UNSPECIFIED JOINT: ICD-10-CM

## 2024-01-18 PROCEDURE — 1160F RVW MEDS BY RX/DR IN RCRD: CPT | Mod: CPTII,S$GLB,, | Performed by: PHYSICIAN ASSISTANT

## 2024-01-18 PROCEDURE — 1159F MED LIST DOCD IN RCRD: CPT | Mod: CPTII,S$GLB,, | Performed by: PHYSICIAN ASSISTANT

## 2024-01-18 PROCEDURE — 3078F DIAST BP <80 MM HG: CPT | Mod: CPTII,S$GLB,, | Performed by: PHYSICIAN ASSISTANT

## 2024-01-18 PROCEDURE — 99999 PR PBB SHADOW E&M-EST. PATIENT-LVL IV: CPT | Mod: PBBFAC,,, | Performed by: PHYSICIAN ASSISTANT

## 2024-01-18 PROCEDURE — 3008F BODY MASS INDEX DOCD: CPT | Mod: CPTII,S$GLB,, | Performed by: PHYSICIAN ASSISTANT

## 2024-01-18 PROCEDURE — 3074F SYST BP LT 130 MM HG: CPT | Mod: CPTII,S$GLB,, | Performed by: PHYSICIAN ASSISTANT

## 2024-01-18 PROCEDURE — 99214 OFFICE O/P EST MOD 30 MIN: CPT | Mod: S$GLB,,, | Performed by: PHYSICIAN ASSISTANT

## 2024-01-18 RX ORDER — UBROGEPANT 100 MG/1
TABLET ORAL
Qty: 10 TABLET | Refills: 6 | Status: SHIPPED | OUTPATIENT
Start: 2024-01-18

## 2024-01-18 RX ORDER — AMITRIPTYLINE HYDROCHLORIDE 25 MG/1
TABLET, FILM COATED ORAL
Qty: 30 TABLET | Refills: 6 | Status: SHIPPED | OUTPATIENT
Start: 2024-01-18 | End: 2024-05-21 | Stop reason: SDUPTHER

## 2024-01-18 NOTE — PROGRESS NOTES
[facility]  [address] [facility] [address]  [place] [address]  Phone:[phone]  Fax: [phone]  Follow up visit     Patient Name: Valerie Cole  : 1995  MRN:  [id number]  Today: 2024   LOV: 2023  chief complaint: Headache    PCP: Christoph Watson MD.       Assessment:   Valerie Cole is a 28 y.o. right handed female with a PMHx of: asthma, GERD, and nephrolithiasis   whom presents solo in follow up. HA appearing to be both migrainous and tension quality, better on current regimen. Has been having body pain/joint pain, GI pain and has been told she has hypermobility. She would like another referral to a rheumatologist on the [place], placed at her request.     Review:    ICD-10-CM ICD-9-CM   1. Episodic migraine  G43.909 346.90   2. Arthralgia, unspecified joint  M25.50 719.40          Plan:        For HA Prevention:  1 no topamax d/t PMHx of nephrolithiasis, no BB d/t PMHx of asthma   2 increased elavil from 20 mg Q2h before bed to 25 mg Q2h before bed        For HA :  Stop nurtec  Try ubrelvy, discussed adv effects/dosing, she agreed      To break up Headaches:  If HA return, can consider another course of deltasone       Other:  Referral to rheumatology           All test results as well as any necessary instructions were reviewed and discussed with patient.    Review:  Orders Placed This Encounter    Ambulatory referral/consult to Rheumatology    amitriptyline (ELAVIL) 25 MG tablet    ubrogepant (UBRELVY) 100 mg tablet           Patient to return to PCP/other specialists for all other problems  Patient to continue on all medications as Rx'd  Full office note available online   RTO- 3-4  months to check in   The patient indicates understanding of these issues and agrees to the plan.    HPI:   Valerie Cole is a 28 y.o.right handed, female with a PMHx of: asthma, GERD, and nephrolithiasis   whom presents solo in follow  up for HA.    At last visit: elavil and prednisone written, has nurtec from PCP.   8 HD in past month, pain stays about 5/10 on average ( noted 2 may have been a bit higher)   Last mild HA right now, feels may be d/t sinuses   Pain retro orbital (R>L and then moves to ipsilateral forehead)  Elavil at 20 mg no side effects   Steroids had delayed benefit, no side effects   Mentions multi location joint pain, hyper mobility, hx of ulcers, GI issues, and numbness that migrates. Has seen rheum, been referred to PT, finds it not helping and would like second opinion.     HA Historically:   Start:HA in past (2 years ago) but were episodic (1 every few months), but became more prevalent in past 8-9 months  and in past 1-2 months more regularly, no triggering nidus per patient/fiance.   History of trauma (no), History of CNS infection (no), History of Stroke (no)  Location:right retroobital  and right temporal, however rarely on the left   Severity: range: 1-6/10  Duration:4+ hours   Frequency:15 HD in past month  Aura: vision change/blurred vision (infrequent)   Associated factors (bolded positive) WITH HA ( or migraine): Nausea, vomiting, photophobia, numbness in facie (rare), phonophobia, tinnitus, scalp pain, vision loss, diplopia, scintillations, eye pain, jaw pain, clumsiness (dropping things-?),  weakness?    Tried:ibuprofen   Triggers (in bold): stress, lack of sleep, too much caffeine, too little caffeine, weather change, seasonal change, strong odours, bright lights, sunlight, food , not a trigger but patient states she does miss meals frequently   Currently having a HA?:yes   Positives in bold: Hx of Kidney Stones, asthma, GI bleed, osteoporosis, CAD/MI, CVA/TIA, DM    Imaging on file: MRI Brain (non contrasted) 11/9/2023-NML  Therapies tried in past: (failures to be marked, if known---why did it fail?)  Zofran  Toradol  Lexapro  Imitrex  Nurtec   Elavil  prednisone    Medication Reconciliation:   Current  Outpatient Medications   Medication Sig Dispense Refill    amitriptyline (ELAVIL) 10 MG tablet Take 2 pills approx 2 hours before bed 60 tablet 6    cetirizine (ZYRTEC) 10 MG tablet Take 1 tablet (10 mg total) by mouth once daily. for 10 days 10 tablet 0    ergocalciferol, vitamin D2, (VITAMIN D ORAL) Take by mouth.      Lactobac no.41/Bifidobact no.7 (PROBIOTIC-10 ORAL) Take by mouth.      multivitamin capsule Take 1 capsule by mouth once daily.      mupirocin (BACTROBAN) 2 % ointment Apply to piercing sites BID 22 g 1    OMEPRAZOLE ORAL Take by mouth.      ondansetron (ZOFRAN-ODT) 4 MG TbDL Dissolve 1 tablet (4 mg total) by mouth every 8 (eight) hours as needed (nausea). 30 tablet 0    predniSONE (DELTASONE) 20 MG tablet On full stomach (breakfast): 3 tabs for 2 days, 2 tabs for 2 days, 1 tab for 2 days. Finish 12 tablet 0    rimegepant sulfate (NURTEC ODT ORAL) Take by mouth.      tacrolimus (PROTOPIC) 0.1 % ointment Apply to affected areas of face BID prn eczema/rash. 100 g 2     No current facility-administered medications for this visit.     Review of patient's allergies indicates:  No Known Allergies    PMHx:  Past Medical History:   Diagnosis Date    Asthma     GERD (gastroesophageal reflux disease)     Kidney stones      No past surgical history on file.    Fhx:  Family History   Problem Relation Age of Onset    Arthritis Mother     Hypertension Mother     Hypertension Father     Hypertension Maternal Aunt     Hypertension Maternal Uncle        Shx:   Social History     Socioeconomic History    Marital status:    Tobacco Use    Smoking status: Never    Smokeless tobacco: Never   Substance and Sexual Activity    Alcohol use: No    Drug use: Never     Social Determinants of Health     Financial Resource Strain: High Risk (11/9/2023)    Overall Financial Resource Strain (CARDIA)     Difficulty of Paying Living Expenses: Hard   Food Insecurity: Food Insecurity Present (11/9/2023)    Hunger Vital Sign      Worried About Running Out of Food in the Last Year: Sometimes true     Ran Out of Food in the Last Year: Never true   Transportation Needs: No Transportation Needs (11/9/2023)    PRAPARE - Transportation     Lack of Transportation (Medical): No     Lack of Transportation (Non-Medical): No   Physical Activity: Sufficiently Active (11/9/2023)    Exercise Vital Sign     Days of Exercise per Week: 2 days     Minutes of Exercise per Session: 90 min   Stress: No Stress Concern Present (11/9/2023)    [employer] - Occupational Stress Questionnaire     Feeling of Stress : Not at all   Social Connections: Unknown (11/9/2023)    Social Connection and Isolation Panel [NHANES]     Frequency of Communication with Friends and Family: More than three times a week     Frequency of Social Gatherings with Friends and Family: Once a week     Active Member of Clubs or Organizations: No     Attends Club or Organization Meetings: Patient declined     Marital Status: Living with partner   Housing Stability: High Risk (11/9/2023)    Housing Stability Vital Sign     Unable to Pay for Housing in the Last Year: Yes     Number of Places Lived in the Last Year: 1     Unstable Housing in the Last Year: No           Labs:   CMP  Sodium   Date Value Ref Range Status   11/09/2023 140 136 - 145 mmol/L Final     Potassium   Date Value Ref Range Status   11/09/2023 3.9 3.5 - 5.1 mmol/L Final     Chloride   Date Value Ref Range Status   11/09/2023 106 95 - 110 mmol/L Final     CO2   Date Value Ref Range Status   11/09/2023 25 23 - 29 mmol/L Final     Glucose   Date Value Ref Range Status   11/09/2023 96 70 - 110 mg/dL Final     BUN   Date Value Ref Range Status   11/09/2023 8 6 - 20 mg/dL Final     Creatinine   Date Value Ref Range Status   11/09/2023 0.9 0.5 - 1.4 mg/dL Final     Calcium   Date Value Ref Range Status   11/09/2023 9.3 8.7 - 10.5 mg/dL Final     Total Protein   Date Value Ref Range Status   11/09/2023 7.0  6.0 - 8.4 g/dL Final     Albumin   Date Value Ref Range Status   2023 4.3 3.5 - 5.2 g/dL Final     Total Bilirubin   Date Value Ref Range Status   2023 0.8 0.1 - 1.0 mg/dL Final     Comment:     For infants and newborns, interpretation of results should be based  on gestational age, weight and in agreement with clinical  observations.    Premature Infant recommended reference ranges:  Up to 24 hours.............<8.0 mg/dL  Up to 48 hours............<12.0 mg/dL  3-5 days..................<15.0 mg/dL  6-29 days.................<15.0 mg/dL       Alkaline Phosphatase   Date Value Ref Range Status   2023 58 55 - 135 U/L Final     AST   Date Value Ref Range Status   2023 26 10 - 40 U/L Final     ALT   Date Value Ref Range Status   2023 29 10 - 44 U/L Final     Anion Gap   Date Value Ref Range Status   2023 9 8 - 16 mmol/L Final     eGFR   Date Value Ref Range Status   2023 >60 >60 mL/min/1.73 m^2 Final     Lab Results   Component Value Date    WBC 4.05 2023    HGB 12.9 2023    HCT 37.5 2023    MCV 92 2023     2023       Lab Results   Component Value Date    TSH 1.239 2023         Imagin2023 MRI Brain (report): Clinical history is headache for 7 days, blurry vision right eye     Multiplanar images of the brain were obtained.     The ventricles and sulci are normal in size and configuration for age. There is no hemorrhage, mass or midline shift. There are no extra-axial fluid collections.  There is normal signal within the white matter on all sequences. There is no abnormality on the diffusion-weighted images to suggest acute infarct. Cerebellum and brainstem are normal.  There are flow voids in the cavernous portions of the internal carotid arteries and basilar artery indicating patency. The corpus callosum, cerebellar tonsils, midline structures and orbits are normal.     IMPRESSION: Negative MRI of the brain      Other  "testing:  Reviewed in chart     Note: I have independently reviewed any/all imaging/labs/tests and agree with the report (s) as documented.  Any discrepancies will be as noted/demarcated by free text.  JOE ZAVALA 1/18/2024                     ROS:   Review Of Systems (questions asked, positive or additions in BOLD)  Gen: Weight change, fatigue/malaise, pyrexia   HEENT: Tinnitus, headache,  blurred vision, eye pain, diplopia, photophobia,  nose bleeds, congestion, sore throat, jaw pain, scalp pain, neck stiffness   Card: Palpitations, CP   Pulm: SOB, cough   Vas: Easy bruising, easy bleeding   GI: N/V/D/C, incontinence, hematemesis, hematochezia    : incontinence, hematuria   Endocrine: Temp intolerance, polyuria, polydipsia   M/S: Neck pain, myalgia, back pain, joint pain, falls    Neuro: PER HPI   PSY: Memory loss, confusion, depression, anxiety, trouble in sleep, hallucinations          EXAM:   /73 (BP Location: Right arm, Patient Position: Sitting, BP Method: Medium (Automatic))   Pulse 84   Temp 98 °F (36.7 °C)   Resp 17   Ht 5' 10" (1.778 m)   Wt 71.1 kg (156 lb 10.2 oz)   LMP 12/29/2023 (Approximate)   BMI 22.48 kg/m²    GEN:  NAD  HEENT: NC/AT   EXTREM:    no edema present.    NEURO:  Mental Status:  Awake, alert and appropriately oriented to time, place, and person.  Normal attention and concentration.  Speech is fluent and appropriate language function (I.e., comprehension).     Cranial Nerves:      Extraocular movements are intact and without nystagmus.  Visual fields are full to confrontation testing.    Facial movement is symmetric.  Hearing is normal. Uvula in midline. FROM of neck in all (6) directions, shoulder shrug symmetrical. Tongue in midline without fasiculation.     Motor:  antigravity in all limbs  Tremor/pronator drift not apparent.       Gait and Stance: Normal manner of stance and gait function testing.           This document has been electronically signed by Mr. Haider HERNANDEZ " Julia JACOBS PA-C on 1/18/2024, I have personally typed this message using the EMR.       Dr Jesus MD was available during today's visit.     Personal Protective Equipment:    Personal Protective Equipment was used during this encounter including: non latex gloves.          CC: Christoph Watson MD/Cherelle Ornelas MD

## 2024-01-26 ENCOUNTER — PATIENT MESSAGE (OUTPATIENT)
Dept: REHABILITATION | Facility: HOSPITAL | Age: 29
End: 2024-01-26
Payer: COMMERCIAL

## 2024-01-30 ENCOUNTER — PROCEDURE VISIT (OUTPATIENT)
Dept: DERMATOLOGY | Facility: CLINIC | Age: 29
End: 2024-01-30
Payer: COMMERCIAL

## 2024-01-30 DIAGNOSIS — D48.5 NEOPLASM OF UNCERTAIN BEHAVIOR OF SKIN: Primary | ICD-10-CM

## 2024-01-30 PROCEDURE — 88304 TISSUE EXAM BY PATHOLOGIST: CPT | Performed by: PATHOLOGY

## 2024-01-30 PROCEDURE — 88305 TISSUE EXAM BY PATHOLOGIST: CPT | Mod: 26,,, | Performed by: PATHOLOGY

## 2024-01-30 PROCEDURE — 12032 INTMD RPR S/A/T/EXT 2.6-7.5: CPT | Mod: 51,S$GLB,, | Performed by: DERMATOLOGY

## 2024-01-30 PROCEDURE — 99499 UNLISTED E&M SERVICE: CPT | Mod: S$GLB,,, | Performed by: DERMATOLOGY

## 2024-01-30 PROCEDURE — 11402 EXC TR-EXT B9+MARG 1.1-2 CM: CPT | Mod: S$GLB,,, | Performed by: DERMATOLOGY

## 2024-01-30 NOTE — PROGRESS NOTES
"  Subjective:      Patient ID:  Valerie Cole is a 28 y.o. female who presents for   Chief Complaint   Patient presents with    lipoma     Pt here for definitive excision of presumed lipomas on R and L forearm.  "Painful when bumped"  Feeling well today.   Denies pacemaker, defibrillator.  No blood thinners.   No additional cutaneous complaints.         Review of Systems   Constitutional:  Negative for fever and chills.       Objective:   Physical Exam   Constitutional: She appears well-developed and well-nourished. No distress.   Neurological: She is alert and oriented to person, place, and time. She is not disoriented.   Psychiatric: She has a normal mood and affect.   Skin:               Diagram Legend     Erythematous scaling macule/papule c/w actinic keratosis       Vascular papule c/w angioma      Pigmented verrucoid papule/plaque c/w seborrheic keratosis      Yellow umbilicated papule c/w sebaceous hyperplasia      Irregularly shaped tan macule c/w lentigo     1-2 mm smooth white papules consistent with Milia      Movable subcutaneous cyst with punctum c/w epidermal inclusion cyst      Subcutaneous movable cyst c/w pilar cyst      Firm pink to brown papule c/w dermatofibroma      Pedunculated fleshy papule(s) c/w skin tag(s)      Evenly pigmented macule c/w junctional nevus     Mildly variegated pigmented, slightly irregular-bordered macule c/w mildly atypical nevus      Flesh colored to evenly pigmented papule c/w intradermal nevus       Pink pearly papule/plaque c/w basal cell carcinoma      Erythematous hyperkeratotic cursted plaque c/w SCC      Surgical scar with no sign of skin cancer recurrence      Open and closed comedones      Inflammatory papules and pustules      Verrucoid papule consistent consistent with wart     Erythematous eczematous patches and plaques     Dystrophic onycholytic nail with subungual debris c/w onychomycosis     Umbilicated papule    Erythematous-base heme-crusted tan " verrucoid plaque consistent with inflamed seborrheic keratosis     Erythematous Silvery Scaling Plaque c/w Psoriasis     See annotation      Assessment / Plan:      Pathology Orders:       Normal Orders This Visit    Specimen to Pathology, Dermatology     Comments:    Number of Specimens:->2  ------------------------->-------------------------  Spec 1 Procedure:->Excision >2cm  Spec 1 Clinical Impression:->lipoma vs other  Spec 1 Source:->left volar forearm  ------------------------->-------------------------  Spec 2 Procedure:->Excision >2cm  Spec 2 Clinical Impression:->lipoma vs other  Spec 2 Source:->R volar forearm    Questions:    Procedure Type: Dermatology and skin neoplasms    Number of Specimens: 2    ------------------------: -------------------------    Spec 1 Procedure: Excision >2cm    Spec 1 Clinical Impression: lipoma vs other    Spec 1 Source: left volar forearm    ------------------------: -------------------------    Spec 2 Procedure: Excision >2cm    Spec 2 Clinical Impression: lipoma vs other    Spec 2 Source: R volar forearm    Release to patient:           Neoplasm of uncertain behavior of skin  -     Specimen to Pathology, Dermatology    PREPARATION: The diagnosis, procedure, alternatives, benefits and risks, including but not limited to: infection, bleeding/bruising, drug reactions, pain, scar or cosmetic defect, local sensation disturbances, wound dehiscence (separation of wound edges after sutures removed) and/or recurrence of present condition were explained to the patient. The patient elected to proceed.  Patient's identity was verified using 2 patient identifiers and the side and site was verified.  Time out period with surgeon, assistant and patient in surgical suite was taken.    PROCEDURE#1 R FOREARM The location noted above was prepped and draped in the usual sterile fashion. The area was anesthetized with intradermal buffered xylocaine. An linear excision with a #15 scalpel was  performed to access the nodule, and dissection was carried out around it.  Electrocoagulation was used to obtain hemostasis. Blood loss was minimal. The wound was then approximated in a layered fashion with 2 intradermal sutures of 4.0 Monocryl, and the wound was then superficially closed with simple interrupted sutures of 4.0 Prolene.   Lesion size: 1.8 cm  Final incision length: 1.8 cm     PROCEDURE#2 L FOREARM The location noted above was prepped and draped in the usual sterile fashion. The area was anesthetized with intradermal buffered xylocaine. An linear excision with a #15 scalpel was performed to access the nodule, and dissection was carried out around it.  Electrocoagulation was used to obtain hemostasis. Blood loss was minimal. The wound was then approximated in a layered fashion with 2 intradermal sutures of 4.0 Monocryl, and the wound was then superficially closed with simple interrupted sutures of 4.0 Prolene.   Lesion size: 1.8 cm  Final incision length: 1.8 cm          Follow up in about 8 days (around 2/7/2024) for suture removal.

## 2024-01-30 NOTE — PATIENT INSTRUCTIONS
Surgery Wound Care    Your doctor has performed an excision today.  A bandage and vaseline ointment has been placed over the site.  This should remain in place for 24 hours.  It is recommended that you keep the area dry for the first 24 hours.  After 24 hours, you may remove the band aid and wash the area with warm soap and water and apply Vaseline jelly or aquaphore.  Many patients prefer to use Neosporin or Bacitracin ointment.  This is acceptable; however know that you can develop an allergy to this medication even if you have used it safely for years.  It is important to keep the area moist.  Letting it dry out and get air slows healing time, will worsen the scar, and make it more difficult to remove the stitches if they were placed.        If you notice increasing redness, tenderness, pain, or yellow drainage at the biopsy or surgical site, please notify your doctor.  These are signs of an infection.    If your biopsy/surgical site is bleeding, apply firm pressure for 15 minutes straight.  Repeat for another 15 minutes, if it is still bleeding.   If the surgical site continues to bleed, then please contact your doctor.    [facility] - DERMATOLOGY  [address] [place] [address] [address]  [facility] [id number]  Dept: [phone]

## 2024-02-02 ENCOUNTER — CLINICAL SUPPORT (OUTPATIENT)
Dept: REHABILITATION | Facility: HOSPITAL | Age: 29
End: 2024-02-02
Payer: COMMERCIAL

## 2024-02-02 DIAGNOSIS — M89.9 SCAPULAR DYSFUNCTION: ICD-10-CM

## 2024-02-02 DIAGNOSIS — R29.898 WEAKNESS OF RIGHT UPPER EXTREMITY: ICD-10-CM

## 2024-02-02 DIAGNOSIS — R29.3 POSTURE ABNORMALITY: Primary | ICD-10-CM

## 2024-02-02 PROCEDURE — 97110 THERAPEUTIC EXERCISES: CPT

## 2024-02-02 PROCEDURE — 97112 NEUROMUSCULAR REEDUCATION: CPT

## 2024-02-02 PROCEDURE — 97530 THERAPEUTIC ACTIVITIES: CPT

## 2024-02-02 NOTE — PLAN OF CARE
Physical Therapy Daily Treatment Note / Updated Plan of Care Note     Name: Valerie Cole  Clinic Number: [id number]    Therapy Diagnosis:   Encounter Diagnoses   Name Primary?    Posture abnormality Yes    Weakness of right upper extremity     Scapular dysfunction        Physician: Milagro Alexander*    Visit Date: 2/2/2024    Physician Orders: PT Eval and Treat  Medical Diagnosis from Referral: M25.50 (ICD-10-CM) - Arthralgia, unspecified joint  Evaluation Date: 11/10/2023  Authorization Period Expiration: 12/31/2024  Plan of Care Expiration: 4/2/2024  Progress Note Due: 2/15/2023  Visit # / Visits authorized: 3/20  FOTO: 1/ 3    1st FOTO Follow up: 12/28/2023   2nd FOTO Follow up:     Time In: 902am  Time Out: 1002am  Total Billable Time: 58 minutes    Precautions: Standard    Subjective     Pt reports: that she had a procedure on her forearms earlier this week to remove 2 lipomas. She was cleared by her doctor   She was compliant with home exercise program.  Response to previous treatment: no change  Functional change: ongoing    Pain: 2/10  Location: right shoulder (deep inside)      Objective     Passive Range of Motion (degrees):   Shoulder Right Left   Flexion 180 180   Abduction 180 180   ER at 0 80  70   ER at 45 90  80   ER at 90 120 (pain at end range) 110      Active Range of Motion (degrees):   Shoulder Right Left   Flexion 180 180   Abduction 180 (pain at end range) 180   ER at 0 80 70         Upper Extremity Strength  (R) UE   (L) UE     Shoulder ER 4/5 Shoulder ER 4/5   Shoulder IR 4/5 Shoulder IR 4/5   Lower Trap 3/5 Lower Trap 3+/5   Middle Trap 3/5 Middle Trap 3+/5      Special Tests:     Instability:       Right Left   Sulcus Sign +  +   Anterior Apprehension Test + -   Relocation test + -   Load and shift test + (anteriorly) -           Valerie received therapeutic exercises to develop strength, endurance, and ROM for 10 minutes including:    Assessment as above  Side lying ER with  "2#, 3 x 8 with 5" holds    Valerie received the following manual therapy techniques: Joint mobilizations were applied to the: shoulder and thoracic spine for 0 minutes, including:      Valerie participated in neuromuscular re-education activities to improve: Coordination, Kinesthetic, Sense, and Proprioception for 38 minutes. The following activities were included:    Joint centration with ER and IR isometrics, 10x each with 3" holds (held after 1 set due to pain)  Rhythmic stabilization from PT, 3 x 30" with arm at 90 degrees flexion  ER and IR walkouts with RTB, 2 x 10 with 3" holds   Self rhythmic stabilization with YTB around wrists, arm by the side, 3 x 30"  Serratus wall slides, 3 x 8  B shoulder ER with YTB, 3 x 8  Hand heel rocks, 3 x 8  Prone mid trap level 3, 2 x 10  Prone low trap level 3, 2 x 10    Not today:  Ball circles on the wall, clockwise and counter clockwise, 3 x 30" each direction  Robots on the wall with YTB around wrists, 2 x 8    Valerie participated in dynamic functional therapeutic activities to improve functional performance for 10  minutes, including:    Modified pushup position on chair, alternating shoulder taps, 3 x 8   carries with 5#, 20 yards x5    Home Exercises Provided and Patient Education Provided     Education provided:   - HEP  - Plan of care  - Excessive mobility in multiple joints  - importance of improving strength and stability around the shoulder    Written Home Exercises Provided: Patient instructed to cont prior HEP.  Exercises were reviewed and Valerie was able to demonstrate them prior to the end of the session.  Valerie demonstrated good  understanding of the education provided.     See EMR under Patient Instructions for exercises provided 11/10/2023.    Assessment     Valerie has been treated for a total of 11 visits over the past 12 weeks to address her complaints of R shoulder pain. Since the onset of her treatment her passive and active ROM has " become a bit less painful but is still bothersome in certain directions. There have been a few instances of exacerbations in her symptoms over the past few weeks which has led to regression in her exercises. She remains with scapular and rotator cuff weakness at this time. She also is still displaying + instability signs at this time. Lifting continues to be a bit painful. At this time Valerie remains a great candidate for skilled PT services at this time.      Valerie Is progressing well towards her goals.   Pt prognosis is Good.     Pt will continue to benefit from skilled outpatient physical therapy to address the deficits listed in the problem list box on initial evaluation, provide pt/family education and to maximize pt's level of independence in the home and community environment.     Pt's spiritual, cultural and educational needs considered and pt agreeable to plan of care and goals.     Anticipated barriers to physical therapy: chronicity of symptoms, high Beighton index     Goals:   Short Term Goals (6 Weeks):   1. Pt will be independent with HEP to supplement PT in improving functional use of R UE. (MET)  2. Pt will display full and pain free shoulder flexion and abduction ROM to demonstrate improved GH joint mechanics. (Progressing)  3. Pt will increase R UE strength by 1/2 MMT grade to demonstrate improved functional strength. (MET)  Long Term Goals (12 Weeks):   1. Pt will improve FOTO score to </=75% to decrease perceived limitation with carrying, moving, and handling objects.(Progressing)  2. Pt will be able to lift 15# overhead without pain to demonstrate improved overhead strength. (Progressing)  3. Pt will increase R UE strength by 1 MMT grade to demonstrate improved functional strength. (Progressing)  4. Pt will be able to perform all dance activities without pain to demonstrate improved overall QOL. (Progressing)  5. Pt will be independent with gym routine to demonstrate improved ability to  maintain strength outside of the clinic. (Progressing)    Plan     Plan of care Certification: 2/2/2024 to 4/2/2024     Outpatient Physical Therapy 1 times weekly for 8 weeks to include the following interventions: Gait Training, Manual Therapy, Moist Heat/ Ice, Neuromuscular Re-ed, Patient Education, Self Care, Therapeutic Activities, and Therapeutic Exercise.     GEOVANNA ZARAGOZA, PT

## 2024-02-02 NOTE — PROGRESS NOTES
Physical Therapy Daily Treatment Note / Updated Plan of Care Note     Name: Valerie Cole  Clinic Number: [id number]    Therapy Diagnosis:   Encounter Diagnoses   Name Primary?    Posture abnormality Yes    Weakness of right upper extremity     Scapular dysfunction        Physician: Milagro Alexander*    Visit Date: 2/2/2024    Physician Orders: PT Eval and Treat  Medical Diagnosis from Referral: M25.50 (ICD-10-CM) - Arthralgia, unspecified joint  Evaluation Date: 11/10/2023  Authorization Period Expiration: 12/31/2024  Plan of Care Expiration: 4/2/2024  Progress Note Due: 2/15/2023  Visit # / Visits authorized: 3/20  FOTO: 1/ 3    1st FOTO Follow up: 12/28/2023   2nd FOTO Follow up:     Time In: 902am  Time Out: 1002am  Total Billable Time: 58 minutes    Precautions: Standard    Subjective     Pt reports: that she had a procedure on her forearms earlier this week to remove 2 lipomas. She was cleared by her doctor   She was compliant with home exercise program.  Response to previous treatment: no change  Functional change: ongoing    Pain: 2/10  Location: right shoulder (deep inside)      Objective     Passive Range of Motion (degrees):   Shoulder Right Left   Flexion 180 180   Abduction 180 180   ER at 0 80  70   ER at 45 90  80   ER at 90 120 (pain at end range) 110      Active Range of Motion (degrees):   Shoulder Right Left   Flexion 180 180   Abduction 180 (pain at end range) 180   ER at 0 80 70         Upper Extremity Strength  (R) UE   (L) UE     Shoulder ER 4/5 Shoulder ER 4/5   Shoulder IR 4/5 Shoulder IR 4/5   Lower Trap 3/5 Lower Trap 3+/5   Middle Trap 3/5 Middle Trap 3+/5      Special Tests:     Instability:       Right Left   Sulcus Sign +  +   Anterior Apprehension Test + -   Relocation test + -   Load and shift test + (anteriorly) -           Valerie received therapeutic exercises to develop strength, endurance, and ROM for 10 minutes including:    Assessment as above  Side lying ER with  "2#, 3 x 8 with 5" holds    Valerie received the following manual therapy techniques: Joint mobilizations were applied to the: shoulder and thoracic spine for 0 minutes, including:      Valerie participated in neuromuscular re-education activities to improve: Coordination, Kinesthetic, Sense, and Proprioception for 38 minutes. The following activities were included:    Joint centration with ER and IR isometrics, 10x each with 3" holds (held after 1 set due to pain)  Rhythmic stabilization from PT, 3 x 30" with arm at 90 degrees flexion  ER and IR walkouts with RTB, 2 x 10 with 3" holds   Self rhythmic stabilization with YTB around wrists, arm by the side, 3 x 30"  Serratus wall slides, 3 x 8  B shoulder ER with YTB, 3 x 8  Hand heel rocks, 3 x 8  Prone mid trap level 3, 2 x 10  Prone low trap level 3, 2 x 10    Not today:  Ball circles on the wall, clockwise and counter clockwise, 3 x 30" each direction  Robots on the wall with YTB around wrists, 2 x 8    Valerie participated in dynamic functional therapeutic activities to improve functional performance for 10  minutes, including:    Modified pushup position on chair, alternating shoulder taps, 3 x 8   carries with 5#, 20 yards x5    Home Exercises Provided and Patient Education Provided     Education provided:   - HEP  - Plan of care  - Excessive mobility in multiple joints  - importance of improving strength and stability around the shoulder    Written Home Exercises Provided: Patient instructed to cont prior HEP.  Exercises were reviewed and Valerie was able to demonstrate them prior to the end of the session.  Valerie demonstrated good  understanding of the education provided.     See EMR under Patient Instructions for exercises provided  11/10/2023 .    Assessment     Valerie has been treated for a total of 11 visits over the past 12 weeks to address her complaints of R shoulder pain. Since the onset of her treatment her passive and active ROM has " become a bit less painful but is still bothersome in certain directions. There have been a few instances of exacerbations in her symptoms over the past few weeks which has led to regression in her exercises. She remains with scapular and rotator cuff weakness at this time. She also is still displaying + instability signs at this time. Lifting continues to be a bit painful. At this time Valerie remains a great candidate for skilled PT services at this time.      Valerie Is progressing well towards her goals.   Pt prognosis is Good.     Pt will continue to benefit from skilled outpatient physical therapy to address the deficits listed in the problem list box on initial evaluation, provide pt/family education and to maximize pt's level of independence in the home and community environment.     Pt's spiritual, cultural and educational needs considered and pt agreeable to plan of care and goals.     Anticipated barriers to physical therapy: chronicity of symptoms, high Beighton index     Goals:   Short Term Goals (6 Weeks):   1. Pt will be independent with HEP to supplement PT in improving functional use of R UE. (MET)  2. Pt will display full and pain free shoulder flexion and abduction ROM to demonstrate improved GH joint mechanics. (Progressing)  3. Pt will increase R UE strength by 1/2 MMT grade to demonstrate improved functional strength. (MET)  Long Term Goals (12 Weeks):   1. Pt will improve FOTO score to </=75% to decrease perceived limitation with carrying, moving, and handling objects.(Progressing)  2. Pt will be able to lift 15# overhead without pain to demonstrate improved overhead strength. (Progressing)  3. Pt will increase R UE strength by 1 MMT grade to demonstrate improved functional strength. (Progressing)  4. Pt will be able to perform all dance activities without pain to demonstrate improved overall QOL. (Progressing)  5. Pt will be independent with gym routine to demonstrate improved ability to  maintain strength outside of the clinic. (Progressing)    Plan     Plan of care Certification: 2/2/2024 to 4/2/2024     Outpatient Physical Therapy 1 times weekly for 8 weeks to include the following interventions: Gait Training, Manual Therapy, Moist Heat/ Ice, Neuromuscular Re-ed, Patient Education, Self Care, Therapeutic Activities, and Therapeutic Exercise.     GEOVANNA ZARAGOZA, PT

## 2024-02-05 NOTE — PROGRESS NOTES
Subjective:      Patient ID: Valerie Cole is a 28 y.o. female.    Chief Complaint: Joint pain    28 year old female previously seen by Dr Vargas  10/2023 for joint pains coming in for evaluation. Ongoing for 10 + years- bilateral knees, left foot, left wrist, right shoulder. No swelling. Xrays show no evidence of inflammatory arthritis. Was referred to PT and has been going for 12 weeks without improvement. Here for a second opinion.     Chronic fatigue, lightheadedness, palpitations on sitting to standing- associated with these seems to have orthostatic symptoms. Has been having stomach issues- nausea, constipation. Has tried stool softener. Feels like her joints are loose and has had several dislocations- shoulder, jaw. Right hip gets stiff, feels like her toes splay when she walks and that causes pain. Left wrist pain and stiffness. Ulcers on lips/cheeks/tongue    Beighton score 7/9 score diagnosed by PT      Rheumatology ROS  (-) Fevers (-) weight loss (-) Fatigue (-) morning stiffness  (-) Headaches (-)  vision changes or loss of vision (-) jaw claudication (-) hx of eye inflammation (-)  photophobia, (-) dry eyes (-) dry mouth (-) Rash, (-) photosensitivity, (-) alopecia, (-) mucosal ulcers, (-) Raynaud's  phenomenon, (-) SQ nodules, (-) sense of skin tightening in hands, face or  torso, (-)  Hx pleurisy, (-) pleuritic chest pain (-) lung fibrosis, (-) hemoptysis, (-) DVT or PE (-) Chest pains, (-) Hx pericarditis (-) Abdominal pain, (-) nausea, (-) vomiting, (-) diarrhea, (-) constipation, (-) melena,  (-) bloody diarrhea/UC/Crohns(-) dysphagia (-) GERD/Reflux (-) Hematuria, proteinuria, (-) renal failure (-) Focal weakness,(-) trouble combing hair or getting out of chairs  (-) History of Low WBC, low platelets, anemia (-)pregnancy losses/pre term deliveries/pregnancy complications (-) genital ulcers (-) numbness tingling    History  Social: no alcohol, smoking, drug use. , works at a  "dance studio  Family: osteoarthritis  Surgical: none          Objective:   /89   Pulse 79   Ht 5' 10" (1.778 m)   Wt 69.9 kg (154 lb)   LMP 12/29/2023 (Approximate)   BMI 22.10 kg/m²   Physical Exam   Constitutional: She is oriented to person, place, and time. She appears well-developed and well-nourished. No distress.   Beighton Score 6/9- Spine, bilateral arms, bilateral legs, left thumb   HENT:   Head: Normocephalic and atraumatic.   Eyes: Conjunctivae are normal. No scleral icterus.   Cardiovascular: Normal rate and normal heart sounds.   Pulmonary/Chest: Effort normal and breath sounds normal.   Abdominal: Soft. Bowel sounds are normal. There is no abdominal tenderness.   Musculoskeletal:         General: Tenderness (left wrist) present. No deformity. Normal range of motion.   Lymphadenopathy:     She has no cervical adenopathy.   Neurological: She is alert and oriented to person, place, and time.   Skin: Skin is warm and dry. No rash noted.   Vitals reviewed.      No data to display     Assessment:     1. Pain in wrist, unspecified laterality    2. Arthralgia, unspecified joint    3. Hypermobile joints    4. Orthostatic lightheadedness          Plan:     Problem List Items Addressed This Visit       Arthralgia    Relevant Orders    CYCLIC CITRUL PEPTIDE ANTIBODY, IGG (Completed)    Sedimentation rate (Completed)    C-REACTIVE PROTEIN (Completed)    RHEUMATOID FACTOR (Completed)    MRI Hand Wrist W WO Bilat_Rheumatoid     Other Visit Diagnoses       Pain in wrist, unspecified laterality    -  Primary    Relevant Orders    MRI Hand Wrist W WO Bilat_Rheumatoid    Hypermobile joints        Orthostatic lightheadedness              28 year old female here for evaluation of hypermobility and joint pain    She does have elevated Beighton score concerning for hypermobility. Has not been evaluated for this    Complains of orthostatic symptoms which seem unrelated to joint issues and hypermobility. Has not " followed with PCP    Has tenderness on L wrist with no synovitis. Previous XR shows concern for some cystic changes in that area. Has not had completed RA work up    Plan  -obtain CCP, RF, CRP, ESR  -obtain MRI wrists  -follow up with [facility] hypermobility clinic  -follow up with PCP regarding orthostatic symptoms    This patient was examined with Dr. Moya. Plan discussed with the patient.

## 2024-02-06 ENCOUNTER — PATIENT MESSAGE (OUTPATIENT)
Dept: RHEUMATOLOGY | Facility: CLINIC | Age: 29
End: 2024-02-06

## 2024-02-06 ENCOUNTER — LAB VISIT (OUTPATIENT)
Dept: LAB | Facility: HOSPITAL | Age: 29
End: 2024-02-06
Payer: COMMERCIAL

## 2024-02-06 ENCOUNTER — OFFICE VISIT (OUTPATIENT)
Dept: RHEUMATOLOGY | Facility: CLINIC | Age: 29
End: 2024-02-06
Payer: COMMERCIAL

## 2024-02-06 VITALS
HEART RATE: 79 BPM | WEIGHT: 154 LBS | SYSTOLIC BLOOD PRESSURE: 134 MMHG | DIASTOLIC BLOOD PRESSURE: 89 MMHG | BODY MASS INDEX: 22.05 KG/M2 | HEIGHT: 70 IN

## 2024-02-06 DIAGNOSIS — M25.50 ARTHRALGIA, UNSPECIFIED JOINT: ICD-10-CM

## 2024-02-06 DIAGNOSIS — M25.539 PAIN IN WRIST, UNSPECIFIED LATERALITY: Primary | ICD-10-CM

## 2024-02-06 DIAGNOSIS — R42 ORTHOSTATIC LIGHTHEADEDNESS: ICD-10-CM

## 2024-02-06 DIAGNOSIS — M24.9 HYPERMOBILE JOINTS: ICD-10-CM

## 2024-02-06 LAB
CCP AB SER IA-ACNC: <0.5 U/ML
CRP SERPL-MCNC: 5.3 MG/L (ref 0–8.2)
ERYTHROCYTE [SEDIMENTATION RATE] IN BLOOD BY PHOTOMETRIC METHOD: 13 MM/HR (ref 0–36)
FINAL PATHOLOGIC DIAGNOSIS: NORMAL
GROSS: NORMAL
Lab: NORMAL
RHEUMATOID FACT SERPL-ACNC: <13 IU/ML (ref 0–15)

## 2024-02-06 PROCEDURE — 3008F BODY MASS INDEX DOCD: CPT | Mod: CPTII,S$GLB,, | Performed by: INTERNAL MEDICINE

## 2024-02-06 PROCEDURE — 1160F RVW MEDS BY RX/DR IN RCRD: CPT | Mod: CPTII,S$GLB,, | Performed by: INTERNAL MEDICINE

## 2024-02-06 PROCEDURE — 1159F MED LIST DOCD IN RCRD: CPT | Mod: CPTII,S$GLB,, | Performed by: INTERNAL MEDICINE

## 2024-02-06 PROCEDURE — 36415 COLL VENOUS BLD VENIPUNCTURE: CPT | Performed by: INTERNAL MEDICINE

## 2024-02-06 PROCEDURE — 85652 RBC SED RATE AUTOMATED: CPT | Performed by: INTERNAL MEDICINE

## 2024-02-06 PROCEDURE — 99214 OFFICE O/P EST MOD 30 MIN: CPT | Mod: S$GLB,,, | Performed by: INTERNAL MEDICINE

## 2024-02-06 PROCEDURE — 86140 C-REACTIVE PROTEIN: CPT | Performed by: INTERNAL MEDICINE

## 2024-02-06 PROCEDURE — 86431 RHEUMATOID FACTOR QUANT: CPT | Performed by: INTERNAL MEDICINE

## 2024-02-06 PROCEDURE — 3079F DIAST BP 80-89 MM HG: CPT | Mod: CPTII,S$GLB,, | Performed by: INTERNAL MEDICINE

## 2024-02-06 PROCEDURE — 99999 PR PBB SHADOW E&M-EST. PATIENT-LVL IV: CPT | Mod: PBBFAC,,, | Performed by: INTERNAL MEDICINE

## 2024-02-06 PROCEDURE — 3075F SYST BP GE 130 - 139MM HG: CPT | Mod: CPTII,S$GLB,, | Performed by: INTERNAL MEDICINE

## 2024-02-06 PROCEDURE — 86200 CCP ANTIBODY: CPT | Performed by: INTERNAL MEDICINE

## 2024-02-06 NOTE — PROGRESS NOTES
2/6/2024     8:51 AM   Rapid3 Question Responses and Scores   MDHAQ Score 0.8   Psychologic Score 0   Pain Score 4.5   When you awakened in the morning OVER THE LAST WEEK, did you feel stiff? Yes   If Yes, please indicate the number of hours until you are as limber as you will be for the day 1   Fatigue Score 5   Global Health Score 4.5   RAPID3 Score 3.89     Answers submitted by the patient for this visit:  Rheumatology Questionnaire (Submitted on 2/6/2024)  fever: Yes  eye redness: No  mouth sores: Yes  headaches: Yes  shortness of breath: Yes  chest pain: No  trouble swallowing: No  diarrhea: No  constipation: Yes  unexpected weight change: No  genital sore: No  dysuria: Yes  During the last 3 days, have you had a skin rash?: No  Bruises or bleeds easily: Yes  cough: Yes

## 2024-02-07 ENCOUNTER — OFFICE VISIT (OUTPATIENT)
Dept: URGENT CARE | Facility: CLINIC | Age: 29
End: 2024-02-07
Payer: COMMERCIAL

## 2024-02-07 VITALS
OXYGEN SATURATION: 99 % | HEIGHT: 70 IN | HEART RATE: 104 BPM | TEMPERATURE: 99 F | DIASTOLIC BLOOD PRESSURE: 68 MMHG | RESPIRATION RATE: 16 BRPM | WEIGHT: 150 LBS | SYSTOLIC BLOOD PRESSURE: 110 MMHG | BODY MASS INDEX: 21.47 KG/M2

## 2024-02-07 DIAGNOSIS — R11.0 NAUSEA: ICD-10-CM

## 2024-02-07 DIAGNOSIS — J06.9 VIRAL URI WITH COUGH: ICD-10-CM

## 2024-02-07 DIAGNOSIS — Z87.09 HISTORY OF ASTHMA: ICD-10-CM

## 2024-02-07 DIAGNOSIS — R05.9 COUGH, UNSPECIFIED TYPE: Primary | ICD-10-CM

## 2024-02-07 LAB
CTP QC/QA: YES
FLUAV AG NPH QL: NEGATIVE
FLUBV AG NPH QL: NEGATIVE
S PYO RRNA THROAT QL PROBE: NEGATIVE
SARS-COV-2 AG RESP QL IA.RAPID: NEGATIVE

## 2024-02-07 PROCEDURE — 87880 STREP A ASSAY W/OPTIC: CPT | Mod: QW,,, | Performed by: NURSE PRACTITIONER

## 2024-02-07 PROCEDURE — 99214 OFFICE O/P EST MOD 30 MIN: CPT | Mod: S$GLB,,, | Performed by: NURSE PRACTITIONER

## 2024-02-07 PROCEDURE — 87804 INFLUENZA ASSAY W/OPTIC: CPT | Mod: 59,QW,, | Performed by: NURSE PRACTITIONER

## 2024-02-07 PROCEDURE — 87811 SARS-COV-2 COVID19 W/OPTIC: CPT | Mod: QW,S$GLB,, | Performed by: NURSE PRACTITIONER

## 2024-02-07 RX ORDER — FLUTICASONE PROPIONATE 50 MCG
1 SPRAY, SUSPENSION (ML) NASAL DAILY
Qty: 15.8 ML | Refills: 0 | Status: SHIPPED | OUTPATIENT
Start: 2024-02-07

## 2024-02-07 RX ORDER — ONDANSETRON 4 MG/1
4 TABLET, ORALLY DISINTEGRATING ORAL EVERY 8 HOURS PRN
Qty: 20 TABLET | Refills: 0 | Status: SHIPPED | OUTPATIENT
Start: 2024-02-07 | End: 2024-05-21 | Stop reason: SDUPTHER

## 2024-02-07 RX ORDER — AZELASTINE 1 MG/ML
1 SPRAY, METERED NASAL 2 TIMES DAILY
Qty: 30 ML | Refills: 0 | Status: SHIPPED | OUTPATIENT
Start: 2024-02-07

## 2024-02-07 RX ORDER — ALBUTEROL SULFATE 90 UG/1
2 AEROSOL, METERED RESPIRATORY (INHALATION) EVERY 6 HOURS PRN
Qty: 18 G | Refills: 0 | Status: SHIPPED | OUTPATIENT
Start: 2024-02-07

## 2024-02-07 RX ORDER — BENZONATATE 100 MG/1
100 CAPSULE ORAL 3 TIMES DAILY PRN
Qty: 30 CAPSULE | Refills: 0 | Status: SHIPPED | OUTPATIENT
Start: 2024-02-07 | End: 2024-02-17

## 2024-02-07 RX ORDER — PROMETHAZINE HYDROCHLORIDE AND DEXTROMETHORPHAN HYDROBROMIDE 6.25; 15 MG/5ML; MG/5ML
5 SYRUP ORAL NIGHTLY PRN
Qty: 118 ML | Refills: 0 | Status: SHIPPED | OUTPATIENT
Start: 2024-02-07

## 2024-02-07 RX ORDER — CETIRIZINE HYDROCHLORIDE 10 MG/1
10 TABLET ORAL DAILY
Qty: 30 TABLET | Refills: 0 | Status: SHIPPED | OUTPATIENT
Start: 2024-02-07 | End: 2024-04-15

## 2024-02-07 NOTE — PATIENT INSTRUCTIONS
Symptomatic treatment to include:    Rest, increase fluid intake to thin mucus, include electrolyte replacement  Ibuprofen/Tylenol as directed for fever, sore throat, body aches  Zrytec 10 mg daily until prescription complete  Flonase daily until bottle empty  Astelin twice daily until congestion resolves, then just as needed  Zofran as prescribed as needed for nausea  Albuterol inhaler every 4-6 hours while awake as needed  guaifenesin 1200 mg twice daily for 10 days to thin mucus  Tessalon Perles cough pills best use for cough caused by postnasal drip/throat irritation  Phenergan cough syrup at night only.  Can take together with Tessalon Perles for added benefit  Refrain from using Afrin nasal spray or any decongestants as this will thicken mucous   Nasal saline spray several times a day  or nasal wash systems  Warm, salt water gargles, over the counter throat lozenges or sprays for sore throat.

## 2024-02-07 NOTE — PROGRESS NOTES
"Subjective:      Patient ID: Valerie Cole is a 28 y.o. female.    Vitals:  height is 5' 10" (1.778 m) and weight is 68 kg (150 lb). Her temperature is 98.5 °F (36.9 °C). Her blood pressure is 110/68 and her pulse is 104. Her respiration is 16 and oxygen saturation is 99%.     Chief Complaint: Cough    Cough  This is a new problem. Episode onset: x 2 days. The problem has been gradually worsening. The cough is Productive of sputum. Associated symptoms include chills (Day 1 of illness), myalgias, nasal congestion, postnasal drip and a sore throat. Pertinent negatives include no ear pain, fever, rash or shortness of breath.       Constitution: Positive for appetite change, chills (Day 1 of illness) and fatigue. Negative for fever.   HENT:  Positive for congestion, postnasal drip and sore throat. Negative for ear pain.    Respiratory:  Positive for cough and asthma. Negative for chest tightness and shortness of breath.    Gastrointestinal:  Positive for nausea. Negative for abdominal pain, vomiting and diarrhea.   Musculoskeletal:  Positive for muscle ache.   Skin:  Negative for rash.   Allergic/Immunologic: Positive for asthma.      Objective:     Physical Exam   Constitutional: She is oriented to person, place, and time. She is cooperative.  Non-toxic appearance. She does not appear ill. No distress. awake  HENT:   Head: Normocephalic and atraumatic.   Ears:   Right Ear: Tympanic membrane, external ear and ear canal normal.   Left Ear: Tympanic membrane, external ear and ear canal normal.   Nose: Congestion present. No rhinorrhea.   Mouth/Throat: Mucous membranes are moist. No oropharyngeal exudate or posterior oropharyngeal erythema.   Eyes: Conjunctivae are normal. Right eye exhibits no discharge. Left eye exhibits no discharge.   Neck: Neck supple. No neck rigidity present.   Cardiovascular: Normal rate, regular rhythm and normal heart sounds.   Pulmonary/Chest: Effort normal and breath sounds normal. No " accessory muscle usage. No tachypnea. No respiratory distress. She has no wheezes. She has no rhonchi. She has no rales. She exhibits no tenderness.   Abdominal: Normal appearance.   Musculoskeletal:      Cervical back: She exhibits no tenderness.   Lymphadenopathy:     She has no cervical adenopathy.   Neurological: no focal deficit. She is alert and oriented to person, place, and time. No sensory deficit.   Skin: Skin is warm, dry, not diaphoretic and no rash. Capillary refill takes 2 to 3 seconds.   Psychiatric: Her behavior is normal. Mood normal.   Nursing note and vitals reviewed.      Assessment:     1. Cough, unspecified type    2. History of asthma    3. Viral URI with cough    4. Nausea      COVID negative  Flu a/B negative  Strep A negative    Plan:       Cough, unspecified type  -     SARS Coronavirus 2 Antigen, POCT Manual Read  -     POCT Influenza A/B Rapid Antigen  -     POCT rapid strep A    History of asthma  -     albuterol (VENTOLIN HFA) 90 mcg/actuation inhaler; Inhale 2 puffs into the lungs every 6 (six) hours as needed for Wheezing. Rescue  Dispense: 18 g; Refill: 0    Viral URI with cough  -     albuterol (VENTOLIN HFA) 90 mcg/actuation inhaler; Inhale 2 puffs into the lungs every 6 (six) hours as needed for Wheezing. Rescue  Dispense: 18 g; Refill: 0  -     azelastine (ASTELIN) 137 mcg (0.1 %) nasal spray; 1 spray (137 mcg total) by Nasal route 2 (two) times daily.  Dispense: 30 mL; Refill: 0  -     fluticasone propionate (FLONASE) 50 mcg/actuation nasal spray; 1 spray (50 mcg total) by Each Nostril route once daily.  Dispense: 15.8 mL; Refill: 0  -     cetirizine (ZYRTEC) 10 MG tablet; Take 1 tablet (10 mg total) by mouth once daily.  Dispense: 30 tablet; Refill: 0  -     benzonatate (TESSALON) 100 MG capsule; Take 1 capsule (100 mg total) by mouth 3 (three) times daily as needed for Cough.  Dispense: 30 capsule; Refill: 0  -     promethazine-dextromethorphan (PROMETHAZINE-DM) 6.25-15 mg/5  mL Syrp; Take 5 mLs by mouth nightly as needed (cough).  Dispense: 118 mL; Refill: 0    Nausea  -     ondansetron (ZOFRAN-ODT) 4 MG TbDL; Take 1 tablet (4 mg total) by mouth every 8 (eight) hours as needed (nausea).  Dispense: 20 tablet; Refill: 0

## 2024-02-07 NOTE — PROGRESS NOTES
Patient seen and examined with fellow.  All elements of history, physical exam and medical decision making independently confirmed by me.  Very pleasant patient with significant joint pain involving the wrist.  X-ray on independent review with cystic changes exam with point tenderness.  Will rule out inflammatory process with MRI and assess with additional labs.  See note for details.

## 2024-02-07 NOTE — LETTER
February 7, 2024      [facility]  [address] [facility] [id number]       Patient: Valerie Cole   YOB: 1995  Date of Visit: 02/07/2024    To Whom It May Concern:    Lizeth Cole  was at [facility] on 02/07/2024. The patient may return to work/school on 02/12/2024  with no restrictions. If you have any questions or concerns, or if I can be of further assistance, please do not hesitate to contact me.    Sincerely,    Magda Walden, NP

## 2024-02-08 ENCOUNTER — CLINICAL SUPPORT (OUTPATIENT)
Dept: DERMATOLOGY | Facility: CLINIC | Age: 29
End: 2024-02-08
Payer: COMMERCIAL

## 2024-02-08 DIAGNOSIS — Z48.02 VISIT FOR SUTURE REMOVAL: Primary | ICD-10-CM

## 2024-02-08 PROCEDURE — 99024 POSTOP FOLLOW-UP VISIT: CPT | Mod: S$GLB,,, | Performed by: DERMATOLOGY

## 2024-02-08 NOTE — PROGRESS NOTES
"  Physical Therapy Daily Treatment Note     Name: Valerie Cole  Clinic Number: [id number]    Therapy Diagnosis:   Encounter Diagnoses   Name Primary?    Posture abnormality Yes    Weakness of right upper extremity     Scapular dysfunction        Physician: Milagro Alexander*    Visit Date: 2/9/2024    Physician Orders: PT Eval and Treat  Medical Diagnosis from Referral: M25.50 (ICD-10-CM) - Arthralgia, unspecified joint  Evaluation Date: 11/10/2023  Authorization Period Expiration: 12/31/2024  Plan of Care Expiration: 4/2/2024  Progress Note Due: 2/15/2023  Visit # / Visits authorized: 4/20  FOTO: 1/ 3    1st FOTO Follow up: 12/28/2023   2nd FOTO Follow up:     Time In: 910am  Time Out: 1005am  Total Billable Time: 50 minutes    Precautions: Standard    Subjective     Pt reports: that she has been struggling with a sinus issue all week but she is starting to feel better. She really has not had too much trouble with her shoulder recently and she feels like she is recovering well from her recent set back.   She was compliant with home exercise program.  Response to previous treatment: no change  Functional change: ongoing    Pain: 2/10  Location: right shoulder (deep inside)      Objective       Valerie received therapeutic exercises to develop strength, endurance, and ROM for 00 minutes including:      Valerie received the following manual therapy techniques: Joint mobilizations were applied to the: shoulder and thoracic spine for 0 minutes, including:      Valerie participated in neuromuscular re-education activities to improve: Coordination, Kinesthetic, Sense, and Proprioception for 40 minutes. The following activities were included:    Joint centration with ER and IR isometrics, 10x each with 3" holds (held after 1 set due to pain)  Rhythmic stabilization from PT, 3 x 30" with arm at 90 degrees flexion  ER and IR walkouts with RTB with arm at 90, 2 x 10 with 3" holds   Side lying ER with 4#, 3 x 8 with " "5" holds  Body blade, are by the side, 4 x 30"  Serratus wall slides with RTB + liftoff, 3 x 8  Hand heel rocks, 3 x 8  Prone mid trap level 3, 2 x 10  Prone low trap level 3, 2 x 10  Robots on the wall with YTB around wrists, 2 x 8    Valerie participated in dynamic functional therapeutic activities to improve functional performance for 10  minutes, including:    Modified pushup position on chair, alternating shoulder taps, 3 x 8   carries with 5#, 20 yards x5    Home Exercises Provided and Patient Education Provided     Education provided:   - HEP  - Plan of care  - Excessive mobility in multiple joints  - importance of improving strength and stability around the shoulder    Written Home Exercises Provided: Patient instructed to cont prior HEP.  Exercises were reviewed and Valerie was able to demonstrate them prior to the end of the session.  Valerie demonstrated good  understanding of the education provided.     See EMR under Patient Instructions for exercises provided  11/10/2023 .    Assessment     Valerie presents to PT today with no reports of pain at rest. She was progressed with rotator cuff activation at higher ranges of motion today and this was challenging for her. Closed chain exercises are being reinitiated at this time as her symptoms seemed to have stabilized following her set back a few weeks ago.     Valerie Is progressing well towards her goals.   Pt prognosis is Good.     Pt will continue to benefit from skilled outpatient physical therapy to address the deficits listed in the problem list box on initial evaluation, provide pt/family education and to maximize pt's level of independence in the home and community environment.     Pt's spiritual, cultural and educational needs considered and pt agreeable to plan of care and goals.     Anticipated barriers to physical therapy: chronicity of symptoms, high Beighton index     Goals:   Short Term Goals (6 Weeks):   1. Pt will be independent " with HEP to supplement PT in improving functional use of R UE. (MET)  2. Pt will display full and pain free shoulder flexion and abduction ROM to demonstrate improved GH joint mechanics. (Progressing)  3. Pt will increase R UE strength by 1/2 MMT grade to demonstrate improved functional strength. (MET)  Long Term Goals (12 Weeks):   1. Pt will improve FOTO score to </=75% to decrease perceived limitation with carrying, moving, and handling objects.(Progressing)  2. Pt will be able to lift 15# overhead without pain to demonstrate improved overhead strength. (Progressing)  3. Pt will increase R UE strength by 1 MMT grade to demonstrate improved functional strength. (Progressing)  4. Pt will be able to perform all dance activities without pain to demonstrate improved overall QOL. (Progressing)  5. Pt will be independent with gym routine to demonstrate improved ability to maintain strength outside of the clinic. (Progressing)    Plan     Plan of care Certification: 2/2/2024 to 4/2/2024     Outpatient Physical Therapy 1 times weekly for 8 weeks to include the following interventions: Gait Training, Manual Therapy, Moist Heat/ Ice, Neuromuscular Re-ed, Patient Education, Self Care, Therapeutic Activities, and Therapeutic Exercise.     GEOVANNA ZARAGOZA, PT

## 2024-02-09 ENCOUNTER — CLINICAL SUPPORT (OUTPATIENT)
Dept: REHABILITATION | Facility: HOSPITAL | Age: 29
End: 2024-02-09
Payer: COMMERCIAL

## 2024-02-09 DIAGNOSIS — M89.9 SCAPULAR DYSFUNCTION: ICD-10-CM

## 2024-02-09 DIAGNOSIS — R29.3 POSTURE ABNORMALITY: Primary | ICD-10-CM

## 2024-02-09 DIAGNOSIS — R29.898 WEAKNESS OF RIGHT UPPER EXTREMITY: ICD-10-CM

## 2024-02-09 PROCEDURE — 97112 NEUROMUSCULAR REEDUCATION: CPT

## 2024-02-09 PROCEDURE — 97530 THERAPEUTIC ACTIVITIES: CPT

## 2024-02-15 NOTE — PROGRESS NOTES
"  Physical Therapy Daily Treatment Note     Name: Valerie Cole  Clinic Number: [id number]    Therapy Diagnosis:   Encounter Diagnoses   Name Primary?    Posture abnormality Yes    Weakness of right upper extremity     Scapular dysfunction          Physician: Milagro Alexander*    Visit Date: 2/16/2024    Physician Orders: PT Eval and Treat  Medical Diagnosis from Referral: M25.50 (ICD-10-CM) - Arthralgia, unspecified joint  Evaluation Date: 11/10/2023  Authorization Period Expiration: 12/31/2024  Plan of Care Expiration: 4/2/2024  Progress Note Due: 2/15/2023  Visit # / Visits authorized: 5/20  FOTO: 1/ 3    1st FOTO Follow up: 12/28/2023   2nd FOTO Follow up:     Time In: 907am  Time Out: 1000am  Total Billable Time: 50 minutes    Precautions: Standard    Subjective     Pt reports: that she feels like she has gotten over her recent set back. She does still have difficulty and occasional pain with overhead activities but it is not too bad.   She was compliant with home exercise program.  Response to previous treatment: no change  Functional change: ongoing    Pain: 2/10  Location: right shoulder (deep inside)      Objective       Valerie received therapeutic exercises to develop strength, endurance, and ROM for 00 minutes including:      Valerie received the following manual therapy techniques: Joint mobilizations were applied to the: shoulder and thoracic spine for 0 minutes, including:      Valerie participated in neuromuscular re-education activities to improve: Coordination, Kinesthetic, Sense, and Proprioception for 40 minutes. The following activities were included:    Joint centration with ER and IR isometrics, 10x each with 3" holds (held after 1 set due to pain)  Rhythmic stabilization from PT, 3 x 30" with arm at 120 degrees flexion  ER and IR walkouts with RTB with arm at 90 + overhead press, 2 x 10 with 3" holds   Side lying ER with 4#, 3 x 8 with 5" holds  Body blade, arm at 90, 4 x " "30"  Serratus wall slides with RTB + liftoff, 3 x 8  Hand heel rocks, 3 x 8  Prone mid trap level 3, 2 x 10  Prone low trap level 3, 2 x 10  Robots on the wall with YTB around wrists, 2 x 8    Valerie participated in dynamic functional therapeutic activities to improve functional performance for 10  minutes, including:    Pushup position on ground, alternating shoulder taps, 3 x 8   carries with 5#, 20 yards x5    Home Exercises Provided and Patient Education Provided     Education provided:   - HEP  - Plan of care  - Excessive mobility in multiple joints  - importance of improving strength and stability around the shoulder    Written Home Exercises Provided: Patient instructed to cont prior HEP.  Exercises were reviewed and Valerie was able to demonstrate them prior to the end of the session.  Valerie demonstrated good  understanding of the education provided.     See EMR under Patient Instructions for exercises provided  11/10/2023 .    Assessment     Valerie continues to gradually improve following her set back that occurred about 5 weeks ago. Her AROM was pain free today. She was further challenged with overhead activities to ensure her cuff is functioning well at higher ranges of motion. She continues to be challenged with closed chain stability exercises and this was progressed to a full pushup position on the ground today.     Valerie Is progressing well towards her goals.   Pt prognosis is Good.     Pt will continue to benefit from skilled outpatient physical therapy to address the deficits listed in the problem list box on initial evaluation, provide pt/family education and to maximize pt's level of independence in the home and community environment.     Pt's spiritual, cultural and educational needs considered and pt agreeable to plan of care and goals.     Anticipated barriers to physical therapy: chronicity of symptoms, high Beighton index     Goals:   Short Term Goals (6 Weeks):   1. Pt will " be independent with HEP to supplement PT in improving functional use of R UE. (MET)  2. Pt will display full and pain free shoulder flexion and abduction ROM to demonstrate improved GH joint mechanics. (Progressing)  3. Pt will increase R UE strength by 1/2 MMT grade to demonstrate improved functional strength. (MET)  Long Term Goals (12 Weeks):   1. Pt will improve FOTO score to </=75% to decrease perceived limitation with carrying, moving, and handling objects.(Progressing)  2. Pt will be able to lift 15# overhead without pain to demonstrate improved overhead strength. (Progressing)  3. Pt will increase R UE strength by 1 MMT grade to demonstrate improved functional strength. (Progressing)  4. Pt will be able to perform all dance activities without pain to demonstrate improved overall QOL. (Progressing)  5. Pt will be independent with gym routine to demonstrate improved ability to maintain strength outside of the clinic. (Progressing)    Plan     Continue to progress per ESTEVAN ZARAGOZA, PT

## 2024-02-16 ENCOUNTER — CLINICAL SUPPORT (OUTPATIENT)
Dept: REHABILITATION | Facility: HOSPITAL | Age: 29
End: 2024-02-16
Payer: COMMERCIAL

## 2024-02-16 DIAGNOSIS — R29.898 WEAKNESS OF RIGHT UPPER EXTREMITY: ICD-10-CM

## 2024-02-16 DIAGNOSIS — R29.3 POSTURE ABNORMALITY: Primary | ICD-10-CM

## 2024-02-16 DIAGNOSIS — M89.9 SCAPULAR DYSFUNCTION: ICD-10-CM

## 2024-02-16 PROCEDURE — 97112 NEUROMUSCULAR REEDUCATION: CPT

## 2024-02-16 PROCEDURE — 97530 THERAPEUTIC ACTIVITIES: CPT

## 2024-02-21 ENCOUNTER — CLINICAL SUPPORT (OUTPATIENT)
Dept: REHABILITATION | Facility: HOSPITAL | Age: 29
End: 2024-02-21
Payer: COMMERCIAL

## 2024-02-21 DIAGNOSIS — M89.9 SCAPULAR DYSFUNCTION: ICD-10-CM

## 2024-02-21 DIAGNOSIS — R29.3 POSTURE ABNORMALITY: Primary | ICD-10-CM

## 2024-02-21 DIAGNOSIS — R29.898 WEAKNESS OF RIGHT UPPER EXTREMITY: ICD-10-CM

## 2024-02-21 PROCEDURE — 97110 THERAPEUTIC EXERCISES: CPT

## 2024-02-21 PROCEDURE — 97112 NEUROMUSCULAR REEDUCATION: CPT

## 2024-02-21 PROCEDURE — 97530 THERAPEUTIC ACTIVITIES: CPT

## 2024-02-21 NOTE — PROGRESS NOTES
"  Physical Therapy Daily Treatment Note     Name: Valerie Cole  Clinic Number: [id number]    Therapy Diagnosis:   Encounter Diagnoses   Name Primary?    Posture abnormality Yes    Weakness of right upper extremity     Scapular dysfunction        Physician: Milagro Alexander*    Visit Date: 2/21/2024    Physician Orders: PT Eval and Treat  Medical Diagnosis from Referral: M25.50 (ICD-10-CM) - Arthralgia, unspecified joint  Evaluation Date: 11/10/2023  Authorization Period Expiration: 12/31/2024  Plan of Care Expiration: 4/2/2024  Progress Note Due: 2/15/2023  Visit # / Visits authorized: 6/20  FOTO: 1/ 3    1st FOTO Follow up: 12/28/2023   2nd FOTO Follow up:     Time In: 302pm  Time Out: 403pm  Total Billable Time: 60 minutes    Precautions: Standard    Subjective     Pt reports: that her shoulder really hasn't been hurting her. She has noticed a few times that her shoulder feels like it "catches", specifically when she does something across her body.   She was compliant with home exercise program.  Response to previous treatment: no change  Functional change: ongoing    Pain: 2/10  Location: right shoulder (deep inside)      Objective     Valerie received therapeutic exercises to develop strength, endurance, and ROM for 10 minutes including:    Prone mid trap level 3 with 2#, 2 x 10  Prone low trap level 3 with 2#, 2 x 10  Side lying ER with 4#, 2 x 10 with 5" holds    Valerie received the following manual therapy techniques: Joint mobilizations were applied to the: shoulder and thoracic spine for 0 minutes, including:      Valerie participated in neuromuscular re-education activities to improve: Coordination, Kinesthetic, Sense, and Proprioception for 40 minutes. The following activities were included:    Joint centration with ER and IR isometrics, 10x each with 3" holds (held after 1 set due to pain)  Rhythmic stabilization from PT, 3 x 30" with arm at 120 degrees flexion  ER and IR walkouts with RTB " "with arm at 90 + overhead press, 2 x 10 with 3" holds   Body blade, arm at 90, 4 x 30"  Serratus wall slides with RTB + liftoff, 3 x 8  Robots on the wall with YTB around wrists, 2 x 8  Planks on elbows on stability ball, 4 x 20" holds    Valerie participated in dynamic functional therapeutic activities to improve functional performance for 10  minutes, including:    Pushup position on ground, alternating shoulder taps, 4 x 8  Overhead carries with 5#, 20 yards x5    Home Exercises Provided and Patient Education Provided     Education provided:   - HEP  - Plan of care  - Excessive mobility in multiple joints  - importance of improving strength and stability around the shoulder    Written Home Exercises Provided: Patient instructed to cont prior HEP.  Exercises were reviewed and Valerie was able to demonstrate them prior to the end of the session.  Valerie demonstrated good  understanding of the education provided.     See EMR under Patient Instructions for exercises provided  11/10/2023 .    Assessment     Valerie's ROM was pain free today. She seems to have recovered from her recent set back as she has been pain free over the past week. Due to this she is being progressed with more loading overhead and with increased closed chain stability exercises. Her HEP was updated today to include more closed chain exercises.     Valerie Is progressing well towards her goals.   Pt prognosis is Good.     Pt will continue to benefit from skilled outpatient physical therapy to address the deficits listed in the problem list box on initial evaluation, provide pt/family education and to maximize pt's level of independence in the home and community environment.     Pt's spiritual, cultural and educational needs considered and pt agreeable to plan of care and goals.     Anticipated barriers to physical therapy: chronicity of symptoms, high Beighton index     Goals:   Short Term Goals (6 Weeks):   1. Pt will be independent with " HEP to supplement PT in improving functional use of R UE. (MET)  2. Pt will display full and pain free shoulder flexion and abduction ROM to demonstrate improved GH joint mechanics. (Progressing)  3. Pt will increase R UE strength by 1/2 MMT grade to demonstrate improved functional strength. (MET)  Long Term Goals (12 Weeks):   1. Pt will improve FOTO score to </=75% to decrease perceived limitation with carrying, moving, and handling objects.(Progressing)  2. Pt will be able to lift 15# overhead without pain to demonstrate improved overhead strength. (Progressing)  3. Pt will increase R UE strength by 1 MMT grade to demonstrate improved functional strength. (Progressing)  4. Pt will be able to perform all dance activities without pain to demonstrate improved overall QOL. (Progressing)  5. Pt will be independent with gym routine to demonstrate improved ability to maintain strength outside of the clinic. (Progressing)    Plan     Continue to progress per ESTEVAN ZARAGOZA, PT

## 2024-02-27 ENCOUNTER — HOSPITAL ENCOUNTER (OUTPATIENT)
Dept: RADIOLOGY | Facility: HOSPITAL | Age: 29
Discharge: HOME | End: 2024-02-27
Attending: INTERNAL MEDICINE
Payer: COMMERCIAL

## 2024-02-27 VITALS — BODY MASS INDEX: 21.52 KG/M2 | WEIGHT: 150 LBS

## 2024-02-27 DIAGNOSIS — M25.539 PAIN IN WRIST, UNSPECIFIED LATERALITY: ICD-10-CM

## 2024-02-27 DIAGNOSIS — M25.50 ARTHRALGIA, UNSPECIFIED JOINT: ICD-10-CM

## 2024-02-27 PROCEDURE — 73223 MRI JOINT UPR EXTR W/O&W/DYE: CPT | Mod: 26,50,, | Performed by: RADIOLOGY

## 2024-02-27 PROCEDURE — 73223 MRI JOINT UPR EXTR W/O&W/DYE: CPT | Mod: TC,50

## 2024-02-27 PROCEDURE — 25500020 PHARM REV CODE 255: Performed by: INTERNAL MEDICINE

## 2024-02-27 PROCEDURE — A9585 GADOBUTROL INJECTION: HCPCS | Performed by: INTERNAL MEDICINE

## 2024-02-27 RX ORDER — GADOBUTROL 604.72 MG/ML
7 INJECTION INTRAVENOUS
Status: COMPLETED | OUTPATIENT
Start: 2024-02-27 | End: 2024-02-27

## 2024-02-27 RX ADMIN — GADOBUTROL 7 ML: 604.72 INJECTION INTRAVENOUS at 05:02

## 2024-02-28 ENCOUNTER — PATIENT MESSAGE (OUTPATIENT)
Dept: RHEUMATOLOGY | Facility: CLINIC | Age: 29
End: 2024-02-28
Payer: COMMERCIAL

## 2024-02-28 DIAGNOSIS — M25.539 PAIN IN WRIST, UNSPECIFIED LATERALITY: Primary | ICD-10-CM

## 2024-02-28 DIAGNOSIS — M67.439 GANGLION OF WRIST, UNSPECIFIED LATERALITY: ICD-10-CM

## 2024-02-29 DIAGNOSIS — R52 PAIN: Primary | ICD-10-CM

## 2024-03-01 ENCOUNTER — CLINICAL SUPPORT (OUTPATIENT)
Dept: REHABILITATION | Facility: HOSPITAL | Age: 29
End: 2024-03-01
Payer: COMMERCIAL

## 2024-03-01 DIAGNOSIS — R29.898 WEAKNESS OF RIGHT UPPER EXTREMITY: ICD-10-CM

## 2024-03-01 DIAGNOSIS — M89.9 SCAPULAR DYSFUNCTION: ICD-10-CM

## 2024-03-01 DIAGNOSIS — R29.3 POSTURE ABNORMALITY: Primary | ICD-10-CM

## 2024-03-01 PROCEDURE — 97112 NEUROMUSCULAR REEDUCATION: CPT

## 2024-03-01 PROCEDURE — 97110 THERAPEUTIC EXERCISES: CPT

## 2024-03-01 NOTE — PROGRESS NOTES
"  Physical Therapy Daily Treatment Note     Name: Valerie Cole  Clinic Number: [id number]    Therapy Diagnosis:   Encounter Diagnoses   Name Primary?    Posture abnormality Yes    Weakness of right upper extremity     Scapular dysfunction          Physician: Milagro Alexander*    Visit Date: 3/1/2024    Physician Orders: PT Eval and Treat  Medical Diagnosis from Referral: M25.50 (ICD-10-CM) - Arthralgia, unspecified joint  Evaluation Date: 11/10/2023  Authorization Period Expiration: 12/31/2024  Plan of Care Expiration: 4/2/2024  Progress Note Due: 2/15/2023  Visit # / Visits authorized: 8/20  FOTO: 1/ 3    1st FOTO Follow up: 12/28/2023   2nd FOTO Follow up:     Time In: 9:05 AM    Time Out: 10:00 AM   Total Billable Time: 55 minutes    Precautions: Standard    Subjective     Pt reports: that her shoulder felt a little sore after new home exercises but has been better overall. Feels some soreness today but no pain.     She was compliant with home exercise program.  Response to previous treatment: no change  Functional change: ongoing    Pain: 2/10  Location: right shoulder (deep inside)      Objective     Valerie received therapeutic exercises to develop strength, endurance, and ROM for 09 minutes including:    Prone mid trap level 2 with 2#, 12x  Prone mid trap level 3 with 2#, 2 x 10    Not performed today:  Prone low trap level 3 with 2#, 2 x 10  Side lying ER with 4#, 2 x 10 with 5" holds    Valerie received the following manual therapy techniques: Joint mobilizations were applied to the: shoulder and thoracic spine for 0 minutes, including:      Valerie participated in neuromuscular re-education activities to improve: Coordination, Kinesthetic, Sense, and Proprioception for 46 minutes. The following activities were included:    Joint centration with ER and IR isometrics, 10x each with 3" holds   Rhythmic stabilization from PT, 3 x 30" with arm at 90 and 120 degrees flexion  Hand heel rocks from " "quadruped with cueing for serratus activation, 2 x 15   Modified push up plus, 3 x 10 cueing for scapular protraction   ER and IR walkouts with GTB with arm at 90 + overhead press, 2 x 10 with 3" holds   Serratus wall slides with RTB, 3 x 10 cueing for eccentric control    Not performed today:  Body blade, arm at 90, 4 x 30"  Robots on the wall with YTB around wrists, 2 x 8  Planks on elbows on stability ball, 4 x 20" holds    Valerie participated in dynamic functional therapeutic activities to improve functional performance for 00 minutes, including:    Pushup position on ground, alternating shoulder taps, 4 x 8  Overhead carries with 5#, 20 yards x5    Home Exercises Provided and Patient Education Provided     Education provided:   - HEP  - Plan of care  - Excessive mobility in multiple joints  - importance of improving strength and stability around the shoulder    Written Home Exercises Provided: Patient instructed to cont prior HEP.  Exercises were reviewed and Valerie was able to demonstrate them prior to the end of the session.  Valerie demonstrated good  understanding of the education provided.     See EMR under Patient Instructions for exercises provided  11/10/2023 .    Assessment     Valerie presents with pain-free R shoulder ROM today.  She requires cueing for maintaining GH out of excess anterior glide during scapular motor control interventions. Able to conduct closed chain exercises with minimal cueing and shows good scapular upward rotation. Rotator cuff strengthening and activation to continue to benefit patient with GH joint centration in overhead motions. Patient will continue to progress with scapular and humerus proprioception and rotator cuff strengthening to reach remainder of her goals.     Valerie Is progressing well towards her goals.   Pt prognosis is Good.     Pt will continue to benefit from skilled outpatient physical therapy to address the deficits listed in the problem list box on " initial evaluation, provide pt/family education and to maximize pt's level of independence in the home and community environment.     Pt's spiritual, cultural and educational needs considered and pt agreeable to plan of care and goals.     Anticipated barriers to physical therapy: chronicity of symptoms, high Beighton index     Goals:   Short Term Goals (6 Weeks):   1. Pt will be independent with HEP to supplement PT in improving functional use of R UE. (MET)  2. Pt will display full and pain free shoulder flexion and abduction ROM to demonstrate improved GH joint mechanics. (Progressing)  3. Pt will increase R UE strength by 1/2 MMT grade to demonstrate improved functional strength. (MET)  Long Term Goals (12 Weeks):   1. Pt will improve FOTO score to </=75% to decrease perceived limitation with carrying, moving, and handling objects.(Progressing)  2. Pt will be able to lift 15# overhead without pain to demonstrate improved overhead strength. (Progressing)  3. Pt will increase R UE strength by 1 MMT grade to demonstrate improved functional strength. (Progressing)  4. Pt will be able to perform all dance activities without pain to demonstrate improved overall QOL. (Progressing)  5. Pt will be independent with gym routine to demonstrate improved ability to maintain strength outside of the clinic. (Progressing)    Plan     Continue to progress per POC    Mika Sheffield, PT     Venkatesh Monique, PT, DPT, OCS (co-treating therapist)

## 2024-03-06 ENCOUNTER — CLINICAL SUPPORT (OUTPATIENT)
Dept: REHABILITATION | Facility: HOSPITAL | Age: 29
End: 2024-03-06
Payer: COMMERCIAL

## 2024-03-06 DIAGNOSIS — R29.3 POSTURE ABNORMALITY: Primary | ICD-10-CM

## 2024-03-06 DIAGNOSIS — R29.898 WEAKNESS OF RIGHT UPPER EXTREMITY: ICD-10-CM

## 2024-03-06 DIAGNOSIS — M89.9 SCAPULAR DYSFUNCTION: ICD-10-CM

## 2024-03-06 PROCEDURE — 97110 THERAPEUTIC EXERCISES: CPT

## 2024-03-06 PROCEDURE — 97112 NEUROMUSCULAR REEDUCATION: CPT

## 2024-03-06 PROCEDURE — 97530 THERAPEUTIC ACTIVITIES: CPT

## 2024-03-06 NOTE — PROGRESS NOTES
Physical Therapy Daily Treatment Note / Progress Note     Name: Valerie Cole  Clinic Number: [id number]    Therapy Diagnosis:   Encounter Diagnoses   Name Primary?    Posture abnormality Yes    Weakness of right upper extremity     Scapular dysfunction      Physician: Milagro Alexander*    Visit Date: 3/6/2024    Physician Orders: PT Eval and Treat  Medical Diagnosis from Referral: M25.50 (ICD-10-CM) - Arthralgia, unspecified joint  Evaluation Date: 11/10/2023  Authorization Period Expiration: 12/31/2024  Plan of Care Expiration: 4/2/2024  Progress Note Due: 3/25/2023  Visit # / Visits authorized: 8/20  FOTO: 1/ 3    1st FOTO Follow up: 12/28/2023   2nd FOTO Follow up:     Time In: 310pm  Time Out: 400pm  Total Billable Time: 50 minutes    Precautions: Standard    Subjective     Pt reports: that she has been feeling good over the past week. She does get the occasional sensation of her shoulder catching or locking but the pain does not last too long.   She was compliant with home exercise program.  Response to previous treatment: no change  Functional change: ongoing    Pain: 2/10  Location: right shoulder (deep inside)      Objective     Passive Range of Motion (degrees):   Shoulder Right Left   Flexion 180 180   Abduction 180 180   ER at 0 80  70   ER at 45 90  80   ER at 90 120 (pain at end range) 110      Active Range of Motion (degrees):   Shoulder Right Left   Flexion 180 180   Abduction 180 (pain at end range) 180   ER at 0 80 70         Upper Extremity Strength  (R) UE   (L) UE     Shoulder ER 4/5 Shoulder ER 4/5   Shoulder IR 4/5 Shoulder IR 4/5   Lower Trap 3/5 Lower Trap 3+/5   Middle Trap 3/5 Middle Trap 3+/5      Special Tests:     Instability:       Right Left   Sulcus Sign +  +   Anterior Apprehension Test + -   Relocation test + -   Load and shift test + (anteriorly) -        Valerie received therapeutic exercises to develop strength, endurance, and ROM for 10 minutes  "including:    Assessment as above    Not performed today:  Prone low trap level 3 with 2#, 2 x 10    Valerie received the following manual therapy techniques: Joint mobilizations were applied to the: shoulder and thoracic spine for 0 minutes, including:      Valerie participated in neuromuscular re-education activities to improve: Coordination, Kinesthetic, Sense, and Proprioception for 30 minutes. The following activities were included:    Joint centration with ER and IR isometrics, 10x each with 3" holds   Rhythmic stabilization from PT, 3 x 30" with arm at 90 and 120 degrees flexion  Prone T on stability ball, 2#, 2 x 10  Prone Y on stability ball, 2#, 2 x 10  Modified push up plus, 3 x 10 cueing for scapular protraction   ER and IR walkouts with GTB with arm at 90 + overhead press, 2 x 10 with 3" holds   Serratus wall slides with RTB, 3 x 10 cueing for eccentric control  Side plank + side lying ER with 4#, 2 x 10 with 5" holds    Not performed today:  Hand heel rocks from quadruped with cueing for serratus activation, 2 x 15   Body blade, arm at 90, 4 x 30"  Robots on the wall with YTB around wrists, 2 x 8  Planks on elbows on stability ball, 4 x 20" holds    Valerie participated in dynamic functional therapeutic activities to improve functional performance for 10 minutes, including:    Pushup position on ground, alternating shoulder taps, 4 x 8  Overhead carries with 5#, 20 yards x5    Home Exercises Provided and Patient Education Provided     Education provided:   - HEP  - Plan of care  - Excessive mobility in multiple joints  - importance of improving strength and stability around the shoulder    Written Home Exercises Provided: Patient instructed to cont prior HEP.  Exercises were reviewed and Valerie was able to demonstrate them prior to the end of the session.  Valerie demonstrated good  understanding of the education provided.     See EMR under Patient Instructions for exercises provided  11/10/2023 " .    Assessment     Valerie has been treated for a total of 16 visits over the past 16 weeks to address her complaints of R shoulder pain. Since the onset of her treatment her passive and active ROM has become a bit less painful but is still bothersome in certain directions. There have been a few instances of exacerbations in her symptoms over the past few weeks which has led to regression in her exercises. She remains with scapular and rotator cuff weakness at this time. She also is still displaying + instability signs at this time. Lifting continues to be a bit painful. At this time Valerie remains a great candidate for skilled PT services at this time.       Valerie Is progressing well towards her goals.   Pt prognosis is Good.     Pt will continue to benefit from skilled outpatient physical therapy to address the deficits listed in the problem list box on initial evaluation, provide pt/family education and to maximize pt's level of independence in the home and community environment.     Pt's spiritual, cultural and educational needs considered and pt agreeable to plan of care and goals.     Anticipated barriers to physical therapy: chronicity of symptoms, high Beighton index     Goals:   Short Term Goals (6 Weeks):   1. Pt will be independent with HEP to supplement PT in improving functional use of R UE. (MET)  2. Pt will display full and pain free shoulder flexion and abduction ROM to demonstrate improved GH joint mechanics. (Progressing)  3. Pt will increase R UE strength by 1/2 MMT grade to demonstrate improved functional strength. (MET)  Long Term Goals (12 Weeks):   1. Pt will improve FOTO score to </=75% to decrease perceived limitation with carrying, moving, and handling objects.(Progressing)  2. Pt will be able to lift 15# overhead without pain to demonstrate improved overhead strength. (Progressing)  3. Pt will increase R UE strength by 1 MMT grade to demonstrate improved functional strength.  (Progressing)  4. Pt will be able to perform all dance activities without pain to demonstrate improved overall QOL. (Progressing)  5. Pt will be independent with gym routine to demonstrate improved ability to maintain strength outside of the clinic. (Progressing)    Plan     Continue to progress per ESTEVAN ZARAGOZA PT   Board Certified in Orthopedic Physical Therapy

## 2024-03-12 ENCOUNTER — OFFICE VISIT (OUTPATIENT)
Dept: PULMONOLOGY | Facility: CLINIC | Age: 29
End: 2024-03-12
Payer: COMMERCIAL

## 2024-03-12 VITALS
DIASTOLIC BLOOD PRESSURE: 80 MMHG | BODY MASS INDEX: 22.27 KG/M2 | SYSTOLIC BLOOD PRESSURE: 118 MMHG | HEIGHT: 70 IN | OXYGEN SATURATION: 100 % | HEART RATE: 113 BPM | WEIGHT: 155.56 LBS

## 2024-03-12 DIAGNOSIS — J45.20 MILD INTERMITTENT ASTHMA WITHOUT COMPLICATION: ICD-10-CM

## 2024-03-12 DIAGNOSIS — D84.9 IMMUNE DEFICIENCY DISORDER: ICD-10-CM

## 2024-03-12 DIAGNOSIS — R09.89 CHRONIC SINUS COMPLAINTS: ICD-10-CM

## 2024-03-12 DIAGNOSIS — G90.A POTS (POSTURAL ORTHOSTATIC TACHYCARDIA SYNDROME): Primary | ICD-10-CM

## 2024-03-12 DIAGNOSIS — Q79.60 EHLERS-DANLOS SYNDROME: ICD-10-CM

## 2024-03-12 PROCEDURE — 99204 OFFICE O/P NEW MOD 45 MIN: CPT | Mod: S$GLB,,, | Performed by: INTERNAL MEDICINE

## 2024-03-12 PROCEDURE — 99999 PR PBB SHADOW E&M-EST. PATIENT-LVL V: CPT | Mod: PBBFAC,,, | Performed by: INTERNAL MEDICINE

## 2024-03-12 PROCEDURE — 3079F DIAST BP 80-89 MM HG: CPT | Mod: CPTII,S$GLB,, | Performed by: INTERNAL MEDICINE

## 2024-03-12 PROCEDURE — 3008F BODY MASS INDEX DOCD: CPT | Mod: CPTII,S$GLB,, | Performed by: INTERNAL MEDICINE

## 2024-03-12 PROCEDURE — 3074F SYST BP LT 130 MM HG: CPT | Mod: CPTII,S$GLB,, | Performed by: INTERNAL MEDICINE

## 2024-03-12 PROCEDURE — 1159F MED LIST DOCD IN RCRD: CPT | Mod: CPTII,S$GLB,, | Performed by: INTERNAL MEDICINE

## 2024-03-12 RX ORDER — PNEUMOCOCCAL 20-VALENT CONJUGATE VACCINE 2.2; 2.2; 2.2; 2.2; 2.2; 2.2; 2.2; 2.2; 2.2; 2.2; 2.2; 2.2; 2.2; 2.2; 2.2; 2.2; 4.4; 2.2; 2.2; 2.2 UG/.5ML; UG/.5ML; UG/.5ML; UG/.5ML; UG/.5ML; UG/.5ML; UG/.5ML; UG/.5ML; UG/.5ML; UG/.5ML; UG/.5ML; UG/.5ML; UG/.5ML; UG/.5ML; UG/.5ML; UG/.5ML; UG/.5ML; UG/.5ML; UG/.5ML; UG/.5ML
0.5 INJECTION, SUSPENSION INTRAMUSCULAR ONCE
Qty: 0.5 ML | Refills: 0 | Status: SHIPPED | OUTPATIENT
Start: 2024-03-12 | End: 2024-03-12

## 2024-03-12 RX ORDER — AZITHROMYCIN 500 MG/1
TABLET, FILM COATED ORAL
Qty: 3 TABLET | Refills: 3 | Status: SHIPPED | OUTPATIENT
Start: 2024-03-12

## 2024-03-12 RX ORDER — MONTELUKAST SODIUM 10 MG/1
10 TABLET ORAL NIGHTLY
Qty: 30 TABLET | Refills: 11 | Status: SHIPPED | OUTPATIENT
Start: 2024-03-12

## 2024-03-12 RX ORDER — PREDNISONE 20 MG/1
TABLET ORAL
Qty: 12 TABLET | Refills: 0 | Status: SHIPPED | OUTPATIENT
Start: 2024-03-12 | End: 2024-05-21

## 2024-03-12 RX ORDER — FLUTICASONE FUROATE, UMECLIDINIUM BROMIDE AND VILANTEROL TRIFENATATE 200; 62.5; 25 UG/1; UG/1; UG/1
1 POWDER RESPIRATORY (INHALATION) DAILY
Qty: 60 EACH | Refills: 11 | Status: SHIPPED | OUTPATIENT
Start: 2024-03-12 | End: 2024-04-15 | Stop reason: SDUPTHER

## 2024-03-12 NOTE — PROGRESS NOTES
3/12/2024    Valerie Cole  New Patient Consult    Chief Complaint   Patient presents with    Asthma     New Patient       HPI:3/12/2024 pt had onset asthma in late teens, pt works , has dog and cat.  No sinus problems. Was on controller 8 yrs with better control. Lost coverage and used albuteol only-- used 2-4 /month.  Pt sleeps through night with no arousals...  No steroid use.  Had had migraines -- used steroids for migranes in past...  Dust will trigger, smells ppt migraines, cleaning fluid ppt....   Allergies noted but vague sinus pressure.  Uses azithromycin 3-4 times   Pt had eval by Dr Brennan in 2-022   pneumococcal titer low..  [facility] pft [facility]..               Pt has 3 have  half sibs with no hypermobility. Pt has postural dizziness since high school-- was started on  elavil 3 months ago.  Has had joint hypermobility and may have Kaelyn Danos..   pt had had tachycardia and dizziness documented at home....   no anneurysm in family..  similar body habitus in other family members.        PFSH:  Past Medical History:   Diagnosis Date    Asthma     GERD (gastroesophageal reflux disease)     Headache     Kidney stones          No past surgical history on file.  Social History     Tobacco Use    Smoking status: Never    Smokeless tobacco: Never   Substance Use Topics    Alcohol use: No    Drug use: Never     Family History   Problem Relation Age of Onset    Arthritis Mother     Hypertension Mother     Hypertension Father     Hypertension Maternal Aunt     Hypertension Maternal Uncle      Review of patient's allergies indicates:  No Known Allergies       Review of Systems:  a review of eleven systems covering constitutional, Eye, HEENT, Psych, Respiratory, Cardiac, GI, , Musculoskeletal, Endocrine, Dermatologic was negative except for pertinent findings as listed ABOVE and below:  pertinent positives as above, rest good        Exam:Comprehensive exam done. /80 Comment: standing   "Pulse (!) 113 Comment: standing  Ht 5' 10" (1.778 m)   Wt 70.5 kg (155 lb 8.6 oz)   SpO2 100% Comment: at room air at rest  BMI 22.32 kg/m²   Exam included Vitals as listed, and patient's appearance and affect and alertness and mood, oral exam for yeast and hygiene and pharynx lesions and Mallapatti (M) score, neck with inspection for jvd and masses and thyroid abnormalities and lymph nodes (supraclavicular and infraclavicular nodes and axillary also examined and noted if abn), chest exam included symmetry and effort and fremitus and percussion and auscultation, cardiac exam included rhythm and gallops and murmur and rubs and jvd and edema, abdominal exam for mass and hepatosplenomegaly and tenderness and hernias and bowel sounds, Musculoskeletal exam with muscle tone and posture and mobility/gait and  strength, and skin for rashes and cyanosis and pallor and turgor, extremity for clubbing.  Findings were normal except for pertinent findings listed below:  M1 tall, slender, orthrostatic pulse with no symptoms, chest is symmetric, no distress, normal percussion, normal fremitus and good normal breath sounds           Radiographs (ct chest and cxr) reviewed: was not done      Labs reviewed           PFT was not done       Plan:  Clinical impression is apparently straight forward and impression with management as below.    Valerie was seen today for asthma.    Diagnoses and all orders for this visit:    POTS (postural orthostatic tachycardia syndrome)  -     Ambulatory referral/consult to Cardiology; Future    Mild intermittent asthma without complication  -     montelukast (SINGULAIR) 10 mg tablet; Take 1 tablet (10 mg total) by mouth every evening.  -     albuterol-budesonide (AIRSUPRA) 90-80 mcg/actuation; Inhale 2 puffs into the lungs every 4 (four) hours as needed (asthma).  -     predniSONE (DELTASONE) 20 MG tablet; Take one daily for 3 days and may repeat for shortness of breath.  -     " fluticasone-umeclidin-vilanter (TRELEGY ELLIPTA) 200-62.5-25 mcg inhaler; Inhale 1 puff into the lungs once daily.    Immune deficiency disorder  -     azithromycin (ZITHROMAX) 500 MG tablet; One daily for yellow mucous, repeat if needed  -     pneumoc 20-lay conj-dip cr,PF, (PREVNAR 20, PF,) 0.5 mL Syrg injection; Inject 0.5 mLs into the muscle once. for 1 dose  -     Humoral Immune Eval (Pneumo Serotypes) With H. Flu; Future    Chronic sinus complaints  -     montelukast (SINGULAIR) 10 mg tablet; Take 1 tablet (10 mg total) by mouth every evening.  -     azithromycin (ZITHROMAX) 500 MG tablet; One daily for yellow mucous, repeat if needed    Kaelyn-Danlos syndrome        Follow up in about 6 months (around 9/12/2024), or if symptoms worsen or fail to improve.    Discussed with patient above for education the following:      Patient Instructions   You have protection to 2 of 13 pneumonia germs -- you should have natural immunity-- you have Immune def disorder.. selective immune def should be mild.  We discuss follow up immunology but will defer...    Need to get vaccine -- immune response to prevnar should be checked in 2 months or so.  Use azithromycin for sinus infections.    Use singulair daily -- consider skipping if no clear benefit.    Use albuterol budeonside 2 puffs up to every 4 hrs as needed, if used daily should have good control.  Or use albuterol............    (Trial trelegy once daily -- continue if helps daily -- you do not appreciate daily asthma symptoms)      Will refer to cardiology for eval and management pots.  Expect EDS.       From uptodate for pots....  Oral fluid intake should be encouraged to a target of 3 L daily and a daily salt intake of 8 to 12 g of sodium chloride    Patients with POTS benefit from an incremental program of aerobic exercise. Many patients with POTS are physically deconditioned and others are at risk for deconditioning by restricting their physical activity to manage  "POTS symptoms   Lifestyle management -- Behavioral and environmental changes may help reduce systemic (nonorthostatic) symptoms. The efficacy of these strategies has not been evaluated systematically in POTS patients but may provide adjunctive benefit to many patients. Such strategies include the following:  ?Encourage patients to be upright as much as possible during the day and to avoid prolonged bedrest, which can lead to further deconditioning.  ?Manage catastrophizing, anxiety, and functional disability with counseling and cognitive behavioral therapy [63,128]. (See "Overview of psychotherapies", section on 'Cognitive and behavioral therapies'.)  ?Develop physical habits to reduce triggering symptoms. As examples, lower extremity muscle tensing, leg crossing, and weight shifting engage the skeletal "muscle pump" to improve venous return and can provide immediate temporary symptom relief.  ?Improve sleep quality by having a consistent bedtime and wake time, not spending time in bed during the day, winding down prior to going to bed, and ensuring that the bedroom environment promotes sleep    ?Midodrine - Midodrine, an alpha adrenoreceptor agonist, is used at 2.5 to 10 mg three times daily. The medication should be administered in the morning, midday, and late afternoon when the patient is in the upright position.           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    Vilma took 59 min  "

## 2024-03-12 NOTE — PATIENT INSTRUCTIONS
"You have protection to 2 of 13 pneumonia germs -- you should have natural immunity-- you have Immune def disorder.. selective immune def should be mild.  We discuss follow up immunology but will defer...    Need to get vaccine -- immune response to prevnar should be checked in 2 months or so.  Use azithromycin for sinus infections.    Use singulair daily -- consider skipping if no clear benefit.    Use albuterol budeonside 2 puffs up to every 4 hrs as needed, if used daily should have good control.  Or use albuterol............    (Trial trelegy once daily -- continue if helps daily -- you do not appreciate daily asthma symptoms)      Will refer to cardiology for eval and management pots.  Expect EDS.       From uptodate for pots....  Oral fluid intake should be encouraged to a target of 3 L daily and a daily salt intake of 8 to 12 g of sodium chloride    Patients with POTS benefit from an incremental program of aerobic exercise. Many patients with POTS are physically deconditioned and others are at risk for deconditioning by restricting their physical activity to manage POTS symptoms   Lifestyle management -- Behavioral and environmental changes may help reduce systemic (nonorthostatic) symptoms. The efficacy of these strategies has not been evaluated systematically in POTS patients but may provide adjunctive benefit to many patients. Such strategies include the following:  ?Encourage patients to be upright as much as possible during the day and to avoid prolonged bedrest, which can lead to further deconditioning.  ?Manage catastrophizing, anxiety, and functional disability with counseling and cognitive behavioral therapy [63,128]. (See "Overview of psychotherapies", section on 'Cognitive and behavioral therapies'.)  ?Develop physical habits to reduce triggering symptoms. As examples, lower extremity muscle tensing, leg crossing, and weight shifting engage the skeletal "muscle pump" to improve venous return and can " provide immediate temporary symptom relief.  ?Improve sleep quality by having a consistent bedtime and wake time, not spending time in bed during the day, winding down prior to going to bed, and ensuring that the bedroom environment promotes sleep    ?Midodrine - Midodrine, an alpha adrenoreceptor agonist, is used at 2.5 to 10 mg three times daily. The medication should be administered in the morning, midday, and late afternoon when the patient is in the upright position.           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

## 2024-03-15 ENCOUNTER — CLINICAL SUPPORT (OUTPATIENT)
Dept: REHABILITATION | Facility: HOSPITAL | Age: 29
End: 2024-03-15
Payer: COMMERCIAL

## 2024-03-15 DIAGNOSIS — M89.9 SCAPULAR DYSFUNCTION: ICD-10-CM

## 2024-03-15 DIAGNOSIS — R29.3 POSTURE ABNORMALITY: Primary | ICD-10-CM

## 2024-03-15 DIAGNOSIS — R29.898 WEAKNESS OF RIGHT UPPER EXTREMITY: ICD-10-CM

## 2024-03-15 PROCEDURE — 97112 NEUROMUSCULAR REEDUCATION: CPT

## 2024-03-15 PROCEDURE — 97110 THERAPEUTIC EXERCISES: CPT

## 2024-03-15 NOTE — PROGRESS NOTES
"  Physical Therapy Daily Treatment Note     Name: Valerie Cole  Clinic Number: [id number]    Therapy Diagnosis:   Encounter Diagnoses   Name Primary?    Posture abnormality Yes    Weakness of right upper extremity     Scapular dysfunction        Physician: Milagro Alexander*    Visit Date: 3/15/2024    Physician Orders: PT Eval and Treat  Medical Diagnosis from Referral: M25.50 (ICD-10-CM) - Arthralgia, unspecified joint  Evaluation Date: 11/10/2023  Authorization Period Expiration: 12/31/2024  Plan of Care Expiration: 4/2/2024  Progress Note Due: 3/25/2023  Visit # / Visits authorized: 9/20  FOTO: 1/ 3    1st FOTO Follow up: 12/28/2023   2nd FOTO Follow up:     Time In: 903am  Time Out: 950am  Total Billable Time: 47 minutes    Precautions: Standard    Subjective     Pt reports: that she has had a very stressful week. Her shoulder has felt good but she has been struggling with some weakness and dizziness for most of the week. She saw pulmonologist on Tuesday and was diagnosed with POTS. Her heart rate has been high. She is seeing her PCP next week and will discuss possible going to see cardio in the future  She was compliant with home exercise program.  Response to previous treatment: no change  Functional change: ongoing    Pain: 2/10  Location: right shoulder (deep inside)      Objective     Valerie received therapeutic exercises to develop strength, endurance, and ROM for 10 minutes including:    Prone low trap level 3 with 2#, 2 x 10  Prone mid trap level 3 with 2#, 2 x 10    Valerie received the following manual therapy techniques: Joint mobilizations were applied to the: shoulder and thoracic spine for 0 minutes, including:      Valerie participated in neuromuscular re-education activities to improve: Coordination, Kinesthetic, Sense, and Proprioception for 35 minutes. The following activities were included:    Chin tucks with biofeedback cuff, 3 x 8 with 5" holds  Chin tucks with biofeedback cuff " "+ shoulder flexion to 90, 15x on each side  Joint centration with ER and IR isometrics, 10x each with 3" holds   Rhythmic stabilization from PT, 3 x 30" with arm at 90 and 120 degrees flexion  Modified push up plus, 3 x 10 cueing for scapular protraction   Side plank + side lying ER with 4#, 2 x 10 with 5" holds    Not performed today:  ER and IR walkouts with GTB with arm at 90 + overhead press, 2 x 10 with 3" holds   Serratus wall slides with RTB, 3 x 10 cueing for eccentric control  Hand heel rocks from quadruped with cueing for serratus activation, 2 x 15   Body blade, arm at 90, 4 x 30"  Robots on the wall with YTB around wrists, 2 x 8  Planks on elbows on stability ball, 4 x 20" holds    Valerie participated in dynamic functional therapeutic activities to improve functional performance for 00 minutes, including:    Pushup position on ground, alternating shoulder taps, 4 x 8  Overhead carries with 5#, 20 yards x5    Home Exercises Provided and Patient Education Provided     Education provided:   - HEP  - Plan of care  - Excessive mobility in multiple joints  - importance of improving strength and stability around the shoulder    Written Home Exercises Provided: Patient instructed to cont prior HEP.  Exercises were reviewed and Valerie was able to demonstrate them prior to the end of the session.  Valerie demonstrated good  understanding of the education provided.     See EMR under Patient Instructions for exercises provided  11/10/2023 .    Assessment     Valerie presents today with symptoms of lightheadedness and dizziness over the past week. Because of this here session was modified a bit today to ensure these symptoms are no triggered. Overall she tolerated her session very well today.     Valerie Is progressing well towards her goals.   Pt prognosis is Good.     Pt will continue to benefit from skilled outpatient physical therapy to address the deficits listed in the problem list box on initial " evaluation, provide pt/family education and to maximize pt's level of independence in the home and community environment.     Pt's spiritual, cultural and educational needs considered and pt agreeable to plan of care and goals.     Anticipated barriers to physical therapy: chronicity of symptoms, high Beighton index     Goals:   Short Term Goals (6 Weeks):   1. Pt will be independent with HEP to supplement PT in improving functional use of R UE. (MET)  2. Pt will display full and pain free shoulder flexion and abduction ROM to demonstrate improved GH joint mechanics. (Progressing)  3. Pt will increase R UE strength by 1/2 MMT grade to demonstrate improved functional strength. (MET)  Long Term Goals (12 Weeks):   1. Pt will improve FOTO score to </=75% to decrease perceived limitation with carrying, moving, and handling objects.(Progressing)  2. Pt will be able to lift 15# overhead without pain to demonstrate improved overhead strength. (Progressing)  3. Pt will increase R UE strength by 1 MMT grade to demonstrate improved functional strength. (Progressing)  4. Pt will be able to perform all dance activities without pain to demonstrate improved overall QOL. (Progressing)  5. Pt will be independent with gym routine to demonstrate improved ability to maintain strength outside of the clinic. (Progressing)    Plan     Continue to progress per ESTEVAN ZARAGOZA, PT   Board Certified in Orthopedic Physical Therapy

## 2024-03-20 ENCOUNTER — CLINICAL SUPPORT (OUTPATIENT)
Dept: REHABILITATION | Facility: HOSPITAL | Age: 29
End: 2024-03-20
Payer: COMMERCIAL

## 2024-03-20 DIAGNOSIS — R29.898 WEAKNESS OF RIGHT UPPER EXTREMITY: ICD-10-CM

## 2024-03-20 DIAGNOSIS — M89.9 SCAPULAR DYSFUNCTION: ICD-10-CM

## 2024-03-20 DIAGNOSIS — R29.3 POSTURE ABNORMALITY: Primary | ICD-10-CM

## 2024-03-20 PROCEDURE — 97530 THERAPEUTIC ACTIVITIES: CPT

## 2024-03-20 PROCEDURE — 97110 THERAPEUTIC EXERCISES: CPT

## 2024-03-20 PROCEDURE — 97112 NEUROMUSCULAR REEDUCATION: CPT

## 2024-03-20 NOTE — PROGRESS NOTES
"  Physical Therapy Daily Treatment Note     Name: Valerie Cole  Clinic Number: [id number]    Therapy Diagnosis:   Encounter Diagnoses   Name Primary?    Posture abnormality Yes    Weakness of right upper extremity     Scapular dysfunction          Physician: Milagro Alexander*    Visit Date: 3/20/2024    Physician Orders: PT Eval and Treat  Medical Diagnosis from Referral: M25.50 (ICD-10-CM) - Arthralgia, unspecified joint  Evaluation Date: 11/10/2023  Authorization Period Expiration: 12/31/2024  Plan of Care Expiration: 4/2/2024  Progress Note Due: 3/25/2023  Visit # / Visits authorized: 10/20  FOTO: 1/ 3    1st FOTO Follow up: 12/28/2023   2nd FOTO Follow up:     Time In: 303pm  Time Out: 400pm  Total Billable Time: 55 minutes    Precautions: Standard    Subjective     Pt reports: that she is feeling better than last week as she has not been as dizzy or lightheaded this week. Her heart rate has not been as high either. The shoulder only bothered her 1x since last week.   She was compliant with home exercise program.  Response to previous treatment: no change  Functional change: ongoing    Pain: 2/10  Location: right shoulder (deep inside)      Objective     Valerie received therapeutic exercises to develop strength, endurance, and ROM for 10 minutes including:    Prone low trap level 3 with 2#, 2 x 10  Prone mid trap level 3 with 2#, 2 x 10    Valerie received the following manual therapy techniques: Joint mobilizations were applied to the: shoulder and thoracic spine for 0 minutes, including:      Valerie participated in neuromuscular re-education activities to improve: Coordination, Kinesthetic, Sense, and Proprioception for 35 minutes. The following activities were included:    ER and IR walkouts with GTB with arm at 90 + overhead press, 2 x 10 with 3" holds   Chin tucks with biofeedback cuff, 3 x 8 with 5" holds  Chin tucks with biofeedback cuff + shoulder flexion to 90, 15x on each side  Joint " "centration with ER and IR isometrics, 10x each with 3" holds   Rhythmic stabilization from PT, 3 x 30" with arm at 90 and 120 degrees flexion  Hand heel rocks from quadruped with cueing for serratus activation, 2 x 15   Serratus wall slides with RTB + lift off, 3 x 10 cueing for eccentric control  Side plank + side lying ER with 4#, 2 x 10 with 5" holds    Not performed today:  Body blade, arm at 90, 4 x 30"  Robots on the wall with YTB around wrists, 2 x 8  Planks on elbows on stability ball, 4 x 20" holds    Valerie participated in dynamic functional therapeutic activities to improve functional performance for 10 minutes, including:    Pushup position on ground, alternating shoulder taps, 4 x 8  Overhead carries with 7#, 20 yards x5    Home Exercises Provided and Patient Education Provided     Education provided:   - HEP  - Plan of care  - Excessive mobility in multiple joints  - importance of improving strength and stability around the shoulder    Written Home Exercises Provided: Patient instructed to cont prior HEP.  Exercises were reviewed and Valerie was able to demonstrate them prior to the end of the session.  Valerie demonstrated good  understanding of the education provided.     See EMR under Patient Instructions for exercises provided  11/10/2023 .    Assessment     Valerie's shoulder remains minimally painful at this time and she is returning to her ADLs without much difficulty. Today her cervical spine stability was addressed with deep neck flexor endurance training as she is excessively mobile at all segments. She was returned to closed chain stability exercises today with good tolerance.     Valerie Is progressing well towards her goals.   Pt prognosis is Good.     Pt will continue to benefit from skilled outpatient physical therapy to address the deficits listed in the problem list box on initial evaluation, provide pt/family education and to maximize pt's level of independence in the home and " community environment.     Pt's spiritual, cultural and educational needs considered and pt agreeable to plan of care and goals.     Anticipated barriers to physical therapy: chronicity of symptoms, high Beighton index     Goals:   Short Term Goals (6 Weeks):   1. Pt will be independent with HEP to supplement PT in improving functional use of R UE. (MET)  2. Pt will display full and pain free shoulder flexion and abduction ROM to demonstrate improved GH joint mechanics. (Progressing)  3. Pt will increase R UE strength by 1/2 MMT grade to demonstrate improved functional strength. (MET)  Long Term Goals (12 Weeks):   1. Pt will improve FOTO score to </=75% to decrease perceived limitation with carrying, moving, and handling objects.(Progressing)  2. Pt will be able to lift 15# overhead without pain to demonstrate improved overhead strength. (Progressing)  3. Pt will increase R UE strength by 1 MMT grade to demonstrate improved functional strength. (Progressing)  4. Pt will be able to perform all dance activities without pain to demonstrate improved overall QOL. (Progressing)  5. Pt will be independent with gym routine to demonstrate improved ability to maintain strength outside of the clinic. (Progressing)    Plan     Continue to progress per ESTEVAN ZARAGOZA, PT   Board Certified in Orthopedic Physical Therapy

## 2024-03-27 ENCOUNTER — CLINICAL SUPPORT (OUTPATIENT)
Dept: REHABILITATION | Facility: HOSPITAL | Age: 29
End: 2024-03-27
Payer: COMMERCIAL

## 2024-03-27 DIAGNOSIS — R29.898 WEAKNESS OF RIGHT UPPER EXTREMITY: ICD-10-CM

## 2024-03-27 DIAGNOSIS — M89.9 SCAPULAR DYSFUNCTION: ICD-10-CM

## 2024-03-27 DIAGNOSIS — R29.3 POSTURE ABNORMALITY: Primary | ICD-10-CM

## 2024-03-27 PROCEDURE — 97112 NEUROMUSCULAR REEDUCATION: CPT

## 2024-03-27 PROCEDURE — 97530 THERAPEUTIC ACTIVITIES: CPT

## 2024-03-27 NOTE — PROGRESS NOTES
"  Physical Therapy Daily Treatment Note     Name: Valerie Cole  Clinic Number: [id number]    Therapy Diagnosis:   Encounter Diagnoses   Name Primary?    Posture abnormality Yes    Weakness of right upper extremity     Scapular dysfunction      Physician: Milagro Alexander*    Visit Date: 3/27/2024    Physician Orders: PT Eval and Treat  Medical Diagnosis from Referral: M25.50 (ICD-10-CM) - Arthralgia, unspecified joint  Evaluation Date: 11/10/2023  Authorization Period Expiration: 12/31/2024  Plan of Care Expiration: 4/2/2024  Progress Note Due: 3/25/2023  Visit # / Visits authorized: 12/20  FOTO: 1/ 3    1st FOTO Follow up: 12/28/2023   2nd FOTO Follow up:     Time In: 407pm  Time Out: 503pm  Total Billable Time: 53 minutes    Precautions: Standard    Subjective     Pt reports: the shoulder really has not bothered her since last week. The heart rate is improving but is still elevated. She is seeing her PCP tomorrow.   She was compliant with home exercise program.  Response to previous treatment: no change  Functional change: ongoing    Pain: 2/10  Location: right shoulder (deep inside)      Objective     Valerie received therapeutic exercises to develop strength, endurance, and ROM for 00 minutes including:      Valerie received the following manual therapy techniques: Joint mobilizations were applied to the: shoulder and thoracic spine for 0 minutes, including:      Valerie participated in neuromuscular re-education activities to improve: Coordination, Kinesthetic, Sense, and Proprioception for 40 minutes. The following activities were included:    ER and IR walkouts with GTB with arm at 90 + overhead press, 2 x 10 with 3" holds   Chin tucks with biofeedback cuff + shoulder flexion to 90 with 4#, 15x on each side  Rhythmic stabilization from PT, 3 x 30" with arm at 90 and 120 degrees flexion  Prone low trap level 3 on stability ball with 2#, 2 x 10  Prone mid trap level 3 on stability ball with 2#, 2 x " "10  Body blade, arm at 90, 4 x 30"  Serratus wall slides with RTB + lift off, 3 x 10 cueing for eccentric control  Side plank + side lying ER with 5#, 3 x 8 with 5" holds    Not performed today:  Robots on the wall with YTB around wrists, 2 x 8  Planks on elbows on stability ball, 4 x 20" holds    Valerie participated in dynamic functional therapeutic activities to improve functional performance for 10 minutes, including:    Pushup position on ground, alternating shoulder taps, 4 x 8  Overhead carries with 7#, 20 yards x5    Home Exercises Provided and Patient Education Provided     Education provided:   - HEP  - Plan of care  - Excessive mobility in multiple joints  - importance of improving strength and stability around the shoulder    Written Home Exercises Provided: Patient instructed to cont prior HEP.  Exercises were reviewed and Valerie was able to demonstrate them prior to the end of the session.  Valerie demonstrated good  understanding of the education provided.     See EMR under Patient Instructions for exercises provided  11/10/2023 .    Assessment     Valerie has maintained good symptom control over the past week. Today her ROM was pain free in all directions. She continues to be challenged with cuff stabilization and scapular strengthening. Overhead activities and closed chain stability is being progressed at this time with good tolerance.     Valerie Is progressing well towards her goals.   Pt prognosis is Good.     Pt will continue to benefit from skilled outpatient physical therapy to address the deficits listed in the problem list box on initial evaluation, provide pt/family education and to maximize pt's level of independence in the home and community environment.     Pt's spiritual, cultural and educational needs considered and pt agreeable to plan of care and goals.     Anticipated barriers to physical therapy: chronicity of symptoms, high Beighton index     Goals:   Short Term Goals (6 " Weeks):   1. Pt will be independent with HEP to supplement PT in improving functional use of R UE. (MET)  2. Pt will display full and pain free shoulder flexion and abduction ROM to demonstrate improved GH joint mechanics. (Progressing)  3. Pt will increase R UE strength by 1/2 MMT grade to demonstrate improved functional strength. (MET)  Long Term Goals (12 Weeks):   1. Pt will improve FOTO score to </=75% to decrease perceived limitation with carrying, moving, and handling objects.(Progressing)  2. Pt will be able to lift 15# overhead without pain to demonstrate improved overhead strength. (Progressing)  3. Pt will increase R UE strength by 1 MMT grade to demonstrate improved functional strength. (Progressing)  4. Pt will be able to perform all dance activities without pain to demonstrate improved overall QOL. (Progressing)  5. Pt will be independent with gym routine to demonstrate improved ability to maintain strength outside of the clinic. (Progressing)    Plan     Continue to progress per ESTEVAN ZARAGOZA, PT   Board Certified in Orthopedic Physical Therapy

## 2024-03-28 ENCOUNTER — OFFICE VISIT (OUTPATIENT)
Dept: FAMILY MEDICINE | Facility: CLINIC | Age: 29
End: 2024-03-28
Payer: COMMERCIAL

## 2024-03-28 VITALS
HEART RATE: 107 BPM | OXYGEN SATURATION: 97 % | WEIGHT: 159.81 LBS | HEIGHT: 70 IN | DIASTOLIC BLOOD PRESSURE: 72 MMHG | TEMPERATURE: 99 F | SYSTOLIC BLOOD PRESSURE: 122 MMHG | BODY MASS INDEX: 22.88 KG/M2

## 2024-03-28 DIAGNOSIS — Z00.00 WELL ADULT EXAM: Primary | ICD-10-CM

## 2024-03-28 DIAGNOSIS — G90.A POTS (POSTURAL ORTHOSTATIC TACHYCARDIA SYNDROME): ICD-10-CM

## 2024-03-28 DIAGNOSIS — M25.50 POLYARTHRALGIA: ICD-10-CM

## 2024-03-28 DIAGNOSIS — Z23 NEED FOR PNEUMOCOCCAL VACCINATION: ICD-10-CM

## 2024-03-28 PROCEDURE — 1159F MED LIST DOCD IN RCRD: CPT | Mod: CPTII,S$GLB,, | Performed by: FAMILY MEDICINE

## 2024-03-28 PROCEDURE — 3074F SYST BP LT 130 MM HG: CPT | Mod: CPTII,S$GLB,, | Performed by: FAMILY MEDICINE

## 2024-03-28 PROCEDURE — 90677 PCV20 VACCINE IM: CPT | Mod: S$GLB,,, | Performed by: FAMILY MEDICINE

## 2024-03-28 PROCEDURE — 99999 PR PBB SHADOW E&M-EST. PATIENT-LVL V: CPT | Mod: PBBFAC,,, | Performed by: FAMILY MEDICINE

## 2024-03-28 PROCEDURE — G0009 ADMIN PNEUMOCOCCAL VACCINE: HCPCS | Mod: S$GLB,,, | Performed by: FAMILY MEDICINE

## 2024-03-28 PROCEDURE — 3008F BODY MASS INDEX DOCD: CPT | Mod: CPTII,S$GLB,, | Performed by: FAMILY MEDICINE

## 2024-03-28 PROCEDURE — 1160F RVW MEDS BY RX/DR IN RCRD: CPT | Mod: CPTII,S$GLB,, | Performed by: FAMILY MEDICINE

## 2024-03-28 PROCEDURE — 3078F DIAST BP <80 MM HG: CPT | Mod: CPTII,S$GLB,, | Performed by: FAMILY MEDICINE

## 2024-03-28 PROCEDURE — 99395 PREV VISIT EST AGE 18-39: CPT | Mod: 25,S$GLB,, | Performed by: FAMILY MEDICINE

## 2024-03-28 NOTE — PROGRESS NOTES
"Subjective:       Patient ID: Valerie Cole is a 28 y.o. female.    Chief Complaint: Annual Exam    Patient here today for annual well adult exam.   Tries to eat a healthy diet.  Exercises regularly.    Immunizations: Tdap 2020  Last Lab work: 2024  Colon Ca screening: due at 45  Breast Ca Screening: MMG at 40  Cervical Ca Screening: PAP 2022     Asthma:  saw pulm recently.  She is on Singulair, prescribed Trelegy but has not been able to start yet.    Joint pain:  saw Rheum x 2.  Dx with hypermobility in her joints.  Lab work normal.  Doing PT.  Recommended f/u with [facility]    POTS: elevated resting heart rate.  Pulm concerned for POTS.  Recommended Cardiology evaluation.      Review of Systems   Constitutional:  Negative for activity change and unexpected weight change.   HENT:  Negative for hearing loss, rhinorrhea and trouble swallowing.    Eyes:  Negative for discharge and visual disturbance.   Respiratory:  Negative for chest tightness and wheezing.    Cardiovascular:  Positive for palpitations. Negative for chest pain.   Gastrointestinal:  Negative for blood in stool, constipation, diarrhea and vomiting.   Endocrine: Negative for polydipsia and polyuria.   Genitourinary:  Negative for difficulty urinating, hematuria and menstrual problem.   Musculoskeletal:  Positive for arthralgias. Negative for joint swelling and neck pain.   Neurological:  Positive for weakness and headaches.   Psychiatric/Behavioral:  Negative for confusion and dysphoric mood.        Objective:      Vitals:    03/28/24 1349   BP: 122/72   Pulse: 107   Temp: 98.9 °F (37.2 °C)   TempSrc: Oral   SpO2: 97%   Weight: 72.5 kg (159 lb 13.3 oz)   Height: 5' 10" (1.778 m)      Physical Exam  Constitutional:       General: She is not in acute distress.  Cardiovascular:      Rate and Rhythm: Normal rate and regular rhythm.      Heart sounds: Normal heart sounds. No murmur heard.  Pulmonary:      Effort: Pulmonary effort is " normal. No respiratory distress.      Breath sounds: Normal breath sounds. No wheezing, rhonchi or rales.   Skin:     General: Skin is warm and dry.   Neurological:      General: No focal deficit present.      Mental Status: She is alert.   Psychiatric:         Mood and Affect: Mood normal.         Behavior: Behavior normal.         Thought Content: Thought content normal.         Results for orders placed or performed in visit on 02/07/24   SARS Coronavirus 2 Antigen, POCT Manual Read   Result Value Ref Range    SARS Coronavirus 2 Antigen Negative Negative     Acceptable Yes    POCT Influenza A/B Rapid Antigen   Result Value Ref Range    Rapid Influenza A Ag Negative Negative    Rapid Influenza B Ag Negative Negative     Acceptable Yes    POCT rapid strep A   Result Value Ref Range    Rapid Strep A Screen Negative Negative     Acceptable Yes       Assessment:       1. Well adult exam    2. Polyarthralgia    3. Need for pneumococcal vaccination    4. POTS (postural orthostatic tachycardia syndrome)        Plan:       Well adult exam  Continue to work on dietary improvements (decrease overall calorie intake, decrease sugar and carb intake, decrease animal protein intake)  Continue to exercise at least 30-40 minutes, 3 times per week  Immunizations were discussed and Prevnar today  Preventative exams were discussed and up to date   Polyarthralgia  Recommended trying to establish with [facility].  Need for pneumococcal vaccination  -     Pneumococcal Conjugate Vaccine (20 Valent) (IM)(Preferred)    POTS (postural orthostatic tachycardia syndrome)  -     Ambulatory referral/consult to Cardiology; Future; Expected date: 04/04/2024          Medication List with Changes/Refills   Current Medications    ALBUTEROL (VENTOLIN HFA) 90 MCG/ACTUATION INHALER    Inhale 2 puffs into the lungs every 6 (six) hours as needed for Wheezing. Rescue    ALBUTEROL-BUDESONIDE (AIRSUPRA) 90-80  MCG/ACTUATION    Inhale 2 puffs into the lungs every 4 (four) hours as needed (asthma).    AMITRIPTYLINE (ELAVIL) 25 MG TABLET    Take1 pill approx 2 hours before bed    AZELASTINE (ASTELIN) 137 MCG (0.1 %) NASAL SPRAY    1 spray (137 mcg total) by Nasal route 2 (two) times daily.    AZITHROMYCIN (ZITHROMAX) 500 MG TABLET    One daily for yellow mucous, repeat if needed    CETIRIZINE (ZYRTEC) 10 MG TABLET    Take 1 tablet (10 mg total) by mouth once daily.    ERGOCALCIFEROL, VITAMIN D2, (VITAMIN D ORAL)    Take by mouth.    FLUTICASONE PROPIONATE (FLONASE) 50 MCG/ACTUATION NASAL SPRAY    1 spray (50 mcg total) by Each Nostril route once daily.    FLUTICASONE-UMECLIDIN-VILANTER (TRELEGY ELLIPTA) 200-62.5-25 MCG INHALER    Inhale 1 puff into the lungs once daily.    LACTOBAC NO.41/BIFIDOBACT NO.7 (PROBIOTIC-10 ORAL)    Take by mouth.    MONTELUKAST (SINGULAIR) 10 MG TABLET    Take 1 tablet (10 mg total) by mouth every evening.    MULTIVITAMIN CAPSULE    Take 1 capsule by mouth once daily.    MUPIROCIN (BACTROBAN) 2 % OINTMENT    Apply to piercing sites BID    OMEPRAZOLE ORAL    Take by mouth.    ONDANSETRON (ZOFRAN-ODT) 4 MG TBDL    Take 1 tablet (4 mg total) by mouth every 8 (eight) hours as needed (nausea).    PREDNISONE (DELTASONE) 20 MG TABLET    Take one daily for 3 days and may repeat for shortness of breath.    PROMETHAZINE-DEXTROMETHORPHAN (PROMETHAZINE-DM) 6.25-15 MG/5 ML SYRP    Take 5 mLs by mouth nightly as needed (cough).    TACROLIMUS (PROTOPIC) 0.1 % OINTMENT    Apply to affected areas of face BID prn eczema/rash.    UBROGEPANT (UBRELVY) 100 MG TABLET    1 pill at onset headache, second pill 2 hours later if needed, no more than 2 pills day/3 days use in week

## 2024-04-01 ENCOUNTER — PATIENT OUTREACH (OUTPATIENT)
Dept: ADMINISTRATIVE | Facility: HOSPITAL | Age: 29
End: 2024-04-01
Payer: COMMERCIAL

## 2024-04-01 NOTE — PROGRESS NOTES
Population Health Chart Review & Patient Outreach Details      Additional [facility] Notes:               Updates Requested / Reviewed:      Updated Care Coordination Note, Care Everywhere, , External Sources: [facility], Care Team Updated, and Immunizations Reconciliation Completed or Queried: [place]         Health Maintenance Topics Overdue:      [facility] Score: 0     Patient is not due for any topics at this time.                       Health Maintenance Topic(s) Outreach Outcomes & Actions Taken:    Cervical Cancer Screening - Outreach Outcomes & Actions Taken  : External Records Uploaded & Care Team Updated if Applicable

## 2024-04-05 ENCOUNTER — CLINICAL SUPPORT (OUTPATIENT)
Dept: REHABILITATION | Facility: HOSPITAL | Age: 29
End: 2024-04-05
Payer: COMMERCIAL

## 2024-04-05 DIAGNOSIS — R29.3 POSTURE ABNORMALITY: Primary | ICD-10-CM

## 2024-04-05 DIAGNOSIS — R29.898 WEAKNESS OF RIGHT UPPER EXTREMITY: ICD-10-CM

## 2024-04-05 DIAGNOSIS — M89.9 SCAPULAR DYSFUNCTION: ICD-10-CM

## 2024-04-05 PROCEDURE — 97110 THERAPEUTIC EXERCISES: CPT

## 2024-04-05 PROCEDURE — 97530 THERAPEUTIC ACTIVITIES: CPT

## 2024-04-05 PROCEDURE — 97112 NEUROMUSCULAR REEDUCATION: CPT

## 2024-04-05 NOTE — PROGRESS NOTES
Physical Therapy Daily Treatment Note / Updated Plan of Care     Name: Valerie Cole  Clinic Number: [id number]    Therapy Diagnosis:   Encounter Diagnoses   Name Primary?    Posture abnormality Yes    Weakness of right upper extremity     Scapular dysfunction      Physician: Milagro Alexander*    Visit Date: 4/5/2024    Physician Orders: PT Eval and Treat  Medical Diagnosis from Referral: M25.50 (ICD-10-CM) - Arthralgia, unspecified joint  Evaluation Date: 11/10/2023  Authorization Period Expiration: 12/31/2024  Plan of Care Expiration: 5/20/2024  Progress Note Due: 5/5/2024  Visit # / Visits authorized: 12/20  FOTO: 1/ 3    1st FOTO Follow up: 12/28/2023   2nd FOTO Follow up:     Time In: 808am  Time Out: 900am  Total Billable Time: 52 minutes    Precautions: Standard    Subjective     Pt reports: that she had 2 days last week where the shoulder felt like it locked up on her a few times but it has since gone away. The pain did go down her arm a bit to her elbow.   She was compliant with home exercise program.  Response to previous treatment: no change  Functional change: ongoing    Pain: 2/10  Location: right shoulder (deep inside)      Objective     Passive Range of Motion (degrees):   Shoulder Right Left   Flexion 180 180   Abduction 180 180   ER at 0 80  70   ER at 45 90  80   ER at 90 120 (pain at end range) 110      Active Range of Motion (degrees):   Shoulder Right Left   Flexion 180 180   Abduction 180 (pain at end range) 180   ER at 0 80 70         Upper Extremity Strength  (R) UE   (L) UE     Shoulder ER 4/5 Shoulder ER 4/5   Shoulder IR 4/5 Shoulder IR 4/5   Lower Trap 3/5 Lower Trap 3+/5   Middle Trap 3/5 Middle Trap 3+/5      Special Tests:     Instability:       Right Left   Sulcus Sign +  +   Anterior Apprehension Test + -   Relocation test + -   Load and shift test + (anteriorly) -     ULTT Radial nerve: + on the R    Valerie received therapeutic exercises to develop strength, endurance,  "and ROM for 10 minutes including:    Assessment as above  R radial nerve gliders, 2 x 10     Valerie received the following manual therapy techniques: Joint mobilizations were applied to the: shoulder and thoracic spine for 0 minutes, including:      Valerie participated in neuromuscular re-education activities to improve: Coordination, Kinesthetic, Sense, and Proprioception for 32 minutes. The following activities were included:    ER and IR walkouts with BTB with arm at 90 + overhead press, 2 x 10 with 3" holds   Chin tucks with biofeedback cuff + shoulder flexion to 90 with 4#, 15x on each side  Rhythmic stabilization from PT, 3 x 30" with arm at 90 and 120 degrees flexion  Prone low trap level 3 on stability ball with 2#, 2 x 10  Prone mid trap level 3 on stability ball with 2#, 2 x 10  D1 flexion with 5# on cable column, 3 x 8  Serratus wall slides with RTB + lift off, 3 x 10 cueing for eccentric control    Not performed today:  Side plank + side lying ER with 5#, 3 x 8 with 5" holds  Body blade, arm at 90, 4 x 30"  Robots on the wall with YTB around wrists, 2 x 8  Planks on elbows on stability ball, 4 x 20" holds    Valerie participated in dynamic functional therapeutic activities to improve functional performance for 10 minutes, including:    Pushup position on ground, alternating shoulder taps, 4 x 8  Overhead carries with 7#, 20 yards x5    Home Exercises Provided and Patient Education Provided     Education provided:   - HEP  - Plan of care  - Excessive mobility in multiple joints  - importance of improving strength and stability around the shoulder    Written Home Exercises Provided: Patient instructed to cont prior HEP.  Exercises were reviewed and Valerie was able to demonstrate them prior to the end of the session.  Valerie demonstrated good  understanding of the education provided.     See EMR under Patient Instructions for exercises provided  11/10/2023 .    Assessment     Valerie has been " "treated for a total of 20 visits over the past 20 weeks to address her complaints of R shoulder pain. Since the onset of her treatment her passive and active ROM has become mostly pain free. Her flare ups have been minimal as of late but she does remain with the occasional sensation of her R shoulder "locking". There was also a + radial nerve ULTT today which will be addressed moving forward. She remains with scapular and rotator cuff weakness at this time. She also is still displaying + instability signs at this time. Lifting continues to be a bit painful. At this time Valerie remains a great candidate for skilled PT services at this time.       Valerie Is progressing well towards her goals.   Pt prognosis is Good.     Pt will continue to benefit from skilled outpatient physical therapy to address the deficits listed in the problem list box on initial evaluation, provide pt/family education and to maximize pt's level of independence in the home and community environment.     Pt's spiritual, cultural and educational needs considered and pt agreeable to plan of care and goals.     Anticipated barriers to physical therapy: chronicity of symptoms, high Beighton index     Goals:   Short Term Goals (6 Weeks):   1. Pt will be independent with HEP to supplement PT in improving functional use of R UE. (MET)  2. Pt will display full and pain free shoulder flexion and abduction ROM to demonstrate improved GH joint mechanics. (Progressing)  3. Pt will increase R UE strength by 1/2 MMT grade to demonstrate improved functional strength. (MET)  Long Term Goals (12 Weeks):   1. Pt will improve FOTO score to </=75% to decrease perceived limitation with carrying, moving, and handling objects.(Progressing)  2. Pt will be able to lift 15# overhead without pain to demonstrate improved overhead strength. (Progressing)  3. Pt will increase R UE strength by 1 MMT grade to demonstrate improved functional strength. (Progressing)  4. Pt " will be able to perform all dance activities without pain to demonstrate improved overall QOL. (Progressing)  5. Pt will be independent with gym routine to demonstrate improved ability to maintain strength outside of the clinic. (Progressing)    Plan     Plan of care Certification: 4/5/2024 to 5/20/2024.     Outpatient Physical Therapy 1 times weekly for 6 weeks to include the following interventions: Gait Training, Manual Therapy, Moist Heat/ Ice, Neuromuscular Re-ed, Patient Education, Self Care, Therapeutic Activities, and Therapeutic Exercise.     GEOVANNA ZARAGOZA, PT   Board Certified in Orthopedic Physical Therapy

## 2024-04-05 NOTE — PLAN OF CARE
Physical Therapy Daily Treatment Note / Updated Plan of Care     Name: Valerie Cole  Clinic Number: [id number]    Therapy Diagnosis:   Encounter Diagnoses   Name Primary?    Posture abnormality Yes    Weakness of right upper extremity     Scapular dysfunction      Physician: Milagro Alexander*    Visit Date: 4/5/2024    Physician Orders: PT Eval and Treat  Medical Diagnosis from Referral: M25.50 (ICD-10-CM) - Arthralgia, unspecified joint  Evaluation Date: 11/10/2023  Authorization Period Expiration: 12/31/2024  Plan of Care Expiration: 5/20/2024  Progress Note Due: 5/5/2024  Visit # / Visits authorized: 12/20  FOTO: 1/ 3    1st FOTO Follow up: 12/28/2023   2nd FOTO Follow up:     Time In: 808am  Time Out: 900am  Total Billable Time: 52 minutes    Precautions: Standard    Subjective     Pt reports: that she had 2 days last week where the shoulder felt like it locked up on her a few times but it has since gone away. The pain did go down her arm a bit to her elbow.   She was compliant with home exercise program.  Response to previous treatment: no change  Functional change: ongoing    Pain: 2/10  Location: right shoulder (deep inside)      Objective     Passive Range of Motion (degrees):   Shoulder Right Left   Flexion 180 180   Abduction 180 180   ER at 0 80  70   ER at 45 90  80   ER at 90 120 (pain at end range) 110      Active Range of Motion (degrees):   Shoulder Right Left   Flexion 180 180   Abduction 180 (pain at end range) 180   ER at 0 80 70         Upper Extremity Strength  (R) UE   (L) UE     Shoulder ER 4/5 Shoulder ER 4/5   Shoulder IR 4/5 Shoulder IR 4/5   Lower Trap 3/5 Lower Trap 3+/5   Middle Trap 3/5 Middle Trap 3+/5      Special Tests:     Instability:       Right Left   Sulcus Sign +  +   Anterior Apprehension Test + -   Relocation test + -   Load and shift test + (anteriorly) -     ULTT Radial nerve: + on the R    Valerie received therapeutic exercises to develop strength, endurance,  "and ROM for 10 minutes including:    Assessment as above  R radial nerve gliders, 2 x 10     Valerie received the following manual therapy techniques: Joint mobilizations were applied to the: shoulder and thoracic spine for 0 minutes, including:      Valerie participated in neuromuscular re-education activities to improve: Coordination, Kinesthetic, Sense, and Proprioception for 32 minutes. The following activities were included:    ER and IR walkouts with BTB with arm at 90 + overhead press, 2 x 10 with 3" holds   Chin tucks with biofeedback cuff + shoulder flexion to 90 with 4#, 15x on each side  Rhythmic stabilization from PT, 3 x 30" with arm at 90 and 120 degrees flexion  Prone low trap level 3 on stability ball with 2#, 2 x 10  Prone mid trap level 3 on stability ball with 2#, 2 x 10  D1 flexion with 5# on cable column, 3 x 8  Serratus wall slides with RTB + lift off, 3 x 10 cueing for eccentric control    Not performed today:  Side plank + side lying ER with 5#, 3 x 8 with 5" holds  Body blade, arm at 90, 4 x 30"  Robots on the wall with YTB around wrists, 2 x 8  Planks on elbows on stability ball, 4 x 20" holds    Valerie participated in dynamic functional therapeutic activities to improve functional performance for 10 minutes, including:    Pushup position on ground, alternating shoulder taps, 4 x 8  Overhead carries with 7#, 20 yards x5    Home Exercises Provided and Patient Education Provided     Education provided:   - HEP  - Plan of care  - Excessive mobility in multiple joints  - importance of improving strength and stability around the shoulder    Written Home Exercises Provided: Patient instructed to cont prior HEP.  Exercises were reviewed and Valerie was able to demonstrate them prior to the end of the session.  Valerie demonstrated good  understanding of the education provided.     See EMR under Patient Instructions for exercises provided 11/10/2023.    Assessment     Valerie has been " "treated for a total of 20 visits over the past 20 weeks to address her complaints of R shoulder pain. Since the onset of her treatment her passive and active ROM has become mostly pain free. Her flare ups have been minimal as of late but she does remain with the occasional sensation of her R shoulder "locking". There was also a + radial nerve ULTT today which will be addressed moving forward. She remains with scapular and rotator cuff weakness at this time. She also is still displaying + instability signs at this time. Lifting continues to be a bit painful. At this time Valerie remains a great candidate for skilled PT services at this time.       Valerie Is progressing well towards her goals.   Pt prognosis is Good.     Pt will continue to benefit from skilled outpatient physical therapy to address the deficits listed in the problem list box on initial evaluation, provide pt/family education and to maximize pt's level of independence in the home and community environment.     Pt's spiritual, cultural and educational needs considered and pt agreeable to plan of care and goals.     Anticipated barriers to physical therapy: chronicity of symptoms, high Beighton index     Goals:   Short Term Goals (6 Weeks):   1. Pt will be independent with HEP to supplement PT in improving functional use of R UE. (MET)  2. Pt will display full and pain free shoulder flexion and abduction ROM to demonstrate improved GH joint mechanics. (Progressing)  3. Pt will increase R UE strength by 1/2 MMT grade to demonstrate improved functional strength. (MET)  Long Term Goals (12 Weeks):   1. Pt will improve FOTO score to </=75% to decrease perceived limitation with carrying, moving, and handling objects.(Progressing)  2. Pt will be able to lift 15# overhead without pain to demonstrate improved overhead strength. (Progressing)  3. Pt will increase R UE strength by 1 MMT grade to demonstrate improved functional strength. (Progressing)  4. Pt " will be able to perform all dance activities without pain to demonstrate improved overall QOL. (Progressing)  5. Pt will be independent with gym routine to demonstrate improved ability to maintain strength outside of the clinic. (Progressing)    Plan     Plan of care Certification: 4/5/2024 to 5/20/2024.     Outpatient Physical Therapy 1 times weekly for 6 weeks to include the following interventions: Gait Training, Manual Therapy, Moist Heat/ Ice, Neuromuscular Re-ed, Patient Education, Self Care, Therapeutic Activities, and Therapeutic Exercise.     GEOVANNA ZARAGOZA, PT   Board Certified in Orthopedic Physical Therapy

## 2024-04-05 NOTE — PROGRESS NOTES
Physical Therapy Daily Treatment Note / Updated Plan of Care Note     Name: Valerie Cole  Clinic Number: [id number]    Therapy Diagnosis:   No diagnosis found.    Physician: Milagro Alexander*    Visit Date: 4/5/2024    Physician Orders: PT Eval and Treat  Medical Diagnosis from Referral: M25.50 (ICD-10-CM) - Arthralgia, unspecified joint  Evaluation Date: 11/10/2023  Authorization Period Expiration: 12/31/2024  Plan of Care Expiration: 5/22/2024  Progress Note Due: 5/5/2024  Visit # / Visits authorized: 13/20  FOTO: 1/ 3    1st FOTO Follow up: 12/28/2023   2nd FOTO Follow up:     Time In: ***  Time Out: ***  Total Billable Time: *** minutes    Precautions: Standard    Subjective     Pt reports: the shoulder really has not bothered her since last week. The heart rate is improving but is still elevated. She is seeing her PCP tomorrow.   She was compliant with home exercise program.  Response to previous treatment: no change  Functional change: ongoing    Pain: 2/10  Location: right shoulder (deep inside)      Objective     Passive Range of Motion (degrees):   Shoulder Right Left   Flexion 180 180   Abduction 180 180   ER at 0 80  70   ER at 45 90  80   ER at 90 120 (pain at end range) 110      Active Range of Motion (degrees):   Shoulder Right Left   Flexion 180 180   Abduction 180 (pain at end range) 180   ER at 0 80 70         Upper Extremity Strength  (R) UE   (L) UE     Shoulder ER 4/5 Shoulder ER 4/5   Shoulder IR 4/5 Shoulder IR 4/5   Lower Trap 3/5 Lower Trap 3+/5   Middle Trap 3/5 Middle Trap 3+/5      Special Tests:     Instability:       Right Left   Sulcus Sign +  +   Anterior Apprehension Test + -   Relocation test + -   Load and shift test + (anteriorly) -           Valerie received therapeutic exercises to develop strength, endurance, and ROM for 00 minutes including:      Valerie received the following manual therapy techniques: Joint mobilizations were applied to the: shoulder and thoracic  "spine for 0 minutes, including:      Valerie participated in neuromuscular re-education activities to improve: Coordination, Kinesthetic, Sense, and Proprioception for 40 minutes. The following activities were included:    ER and IR walkouts with GTB with arm at 90 + overhead press, 2 x 10 with 3" holds   Chin tucks with biofeedback cuff + shoulder flexion to 90 with 4#, 15x on each side  Rhythmic stabilization from PT, 3 x 30" with arm at 90 and 120 degrees flexion  Prone low trap level 3 on stability ball with 2#, 2 x 10  Prone mid trap level 3 on stability ball with 2#, 2 x 10  Body blade, arm at 90, 4 x 30"  Serratus wall slides with RTB + lift off, 3 x 10 cueing for eccentric control  Side plank + side lying ER with 5#, 3 x 8 with 5" holds    Not performed today:  Robots on the wall with YTB around wrists, 2 x 8  Planks on elbows on stability ball, 4 x 20" holds    Valerie participated in dynamic functional therapeutic activities to improve functional performance for 10 minutes, including:    Pushup position on ground, alternating shoulder taps, 4 x 8  Overhead carries with 7#, 20 yards x5    Home Exercises Provided and Patient Education Provided     Education provided:   - HEP  - Plan of care  - Excessive mobility in multiple joints  - importance of improving strength and stability around the shoulder    Written Home Exercises Provided: Patient instructed to cont prior HEP.  Exercises were reviewed and Valerie was able to demonstrate them prior to the end of the session.  Valerie demonstrated good  understanding of the education provided.     See EMR under Patient Instructions for exercises provided  11/10/2023 .    Assessment     Valerie has been treated for a total of *** visits over the past 20 weeks to address her complaints of R shoulder pain. Since the onset of her treatment her passive and active ROM has become a bit less painful but is still bothersome in certain directions. There have been a few " instances of exacerbations in her symptoms over the past few weeks which has led to regression in her exercises. She remains with scapular and rotator cuff weakness at this time. She also is still displaying + instability signs at this time. Lifting continues to be a bit painful. At this time Valerie remains a great candidate for skilled PT services at this time.       Valerie Is progressing well towards her goals.   Pt prognosis is Good.     Pt will continue to benefit from skilled outpatient physical therapy to address the deficits listed in the problem list box on initial evaluation, provide pt/family education and to maximize pt's level of independence in the home and community environment.     Pt's spiritual, cultural and educational needs considered and pt agreeable to plan of care and goals.     Anticipated barriers to physical therapy: chronicity of symptoms, high Beighton index     Goals:   Short Term Goals (6 Weeks):   1. Pt will be independent with HEP to supplement PT in improving functional use of R UE. (MET)  2. Pt will display full and pain free shoulder flexion and abduction ROM to demonstrate improved GH joint mechanics. (Progressing)  3. Pt will increase R UE strength by 1/2 MMT grade to demonstrate improved functional strength. (MET)  Long Term Goals (12 Weeks):   1. Pt will improve FOTO score to </=75% to decrease perceived limitation with carrying, moving, and handling objects.(Progressing)  2. Pt will be able to lift 15# overhead without pain to demonstrate improved overhead strength. (Progressing)  3. Pt will increase R UE strength by 1 MMT grade to demonstrate improved functional strength. (Progressing)  4. Pt will be able to perform all dance activities without pain to demonstrate improved overall QOL. (Progressing)  5. Pt will be independent with gym routine to demonstrate improved ability to maintain strength outside of the clinic. (Progressing)    Plan     Plan of care Certification:  4/5/2024 to 5/22/2024.     Outpatient Physical Therapy 1 times weekly for 6 weeks to include the following interventions: Gait Training, Manual Therapy, Moist Heat/ Ice, Neuromuscular Re-ed, Patient Education, Self Care, Therapeutic Activities, and Therapeutic Exercise.     GEOVANNA ZARAGOZA, PT   Board Certified in Orthopedic Physical Therapy

## 2024-04-10 ENCOUNTER — OFFICE VISIT (OUTPATIENT)
Dept: ORTHOPEDICS | Facility: CLINIC | Age: 29
End: 2024-04-10
Payer: COMMERCIAL

## 2024-04-10 DIAGNOSIS — M24.232: Primary | ICD-10-CM

## 2024-04-10 PROCEDURE — 99204 OFFICE O/P NEW MOD 45 MIN: CPT | Mod: S$GLB,,, | Performed by: ORTHOPAEDIC SURGERY

## 2024-04-10 PROCEDURE — 1159F MED LIST DOCD IN RCRD: CPT | Mod: CPTII,S$GLB,, | Performed by: ORTHOPAEDIC SURGERY

## 2024-04-10 PROCEDURE — 99999 PR PBB SHADOW E&M-EST. PATIENT-LVL III: CPT | Mod: PBBFAC,,, | Performed by: ORTHOPAEDIC SURGERY

## 2024-04-10 NOTE — PROGRESS NOTES
4/10/2024    Chief Complaint:  Chief Complaint   Patient presents with    Left Wrist - Pain     Ganglion cyst       HPI:  Valerie Cole is a 28 y.o. female, who presents to clinic today she has a history of pain to her left wrist.  She does have some history of hypermobility.  She continued to have soreness and pain about her wrist as well as in her shoulders.  She has been seen by Rheumatology.  She was sent for imaging.  There was noted be a ganglion cyst on MRI.  She is here today for discussion of treatment options for her left wrist    PMHX:  Past Medical History:   Diagnosis Date    Asthma     GERD (gastroesophageal reflux disease)     Headache     Kidney stones        PSHX:  History reviewed. No pertinent surgical history.    FMHX:  Family History   Problem Relation Age of Onset    Arthritis Mother     Hypertension Mother     Hypertension Father     Hypertension Maternal Aunt     Hypertension Maternal Uncle        SOCHX:  Social History     Tobacco Use    Smoking status: Never    Smokeless tobacco: Never   Substance Use Topics    Alcohol use: No       ALLERGIES:  Patient has no known allergies.    CURRENT MEDICATIONS:  Current Outpatient Medications on File Prior to Visit   Medication Sig Dispense Refill    albuterol (VENTOLIN HFA) 90 mcg/actuation inhaler Inhale 2 puffs into the lungs every 6 (six) hours as needed for Wheezing. Rescue 18 g 0    albuterol-budesonide (AIRSUPRA) 90-80 mcg/actuation Inhale 2 puffs into the lungs every 4 (four) hours as needed (asthma). 10.7 g 11    amitriptyline (ELAVIL) 25 MG tablet Take1 pill approx 2 hours before bed 30 tablet 6    ergocalciferol, vitamin D2, (VITAMIN D ORAL) Take by mouth.      fluticasone-umeclidin-vilanter (TRELEGY ELLIPTA) 200-62.5-25 mcg inhaler Inhale 1 puff into the lungs once daily. 60 each 11    Lactobac no.41/Bifidobact no.7 (PROBIOTIC-10 ORAL) Take by mouth.      montelukast (SINGULAIR) 10 mg tablet Take 1 tablet (10 mg total) by mouth every  evening. 30 tablet 11    multivitamin capsule Take 1 capsule by mouth once daily.      mupirocin (BACTROBAN) 2 % ointment Apply to piercing sites BID 22 g 1    OMEPRAZOLE ORAL Take by mouth.      ondansetron (ZOFRAN-ODT) 4 MG TbDL Take 1 tablet (4 mg total) by mouth every 8 (eight) hours as needed (nausea). 20 tablet 0    predniSONE (DELTASONE) 20 MG tablet Take one daily for 3 days and may repeat for shortness of breath. 12 tablet 0    promethazine-dextromethorphan (PROMETHAZINE-DM) 6.25-15 mg/5 mL Syrp Take 5 mLs by mouth nightly as needed (cough). 118 mL 0    tacrolimus (PROTOPIC) 0.1 % ointment Apply to affected areas of face BID prn eczema/rash. 100 g 2    ubrogepant (UBRELVY) 100 mg tablet 1 pill at onset headache, second pill 2 hours later if needed, no more than 2 pills day/3 days use in week 10 tablet 6    azelastine (ASTELIN) 137 mcg (0.1 %) nasal spray 1 spray (137 mcg total) by Nasal route 2 (two) times daily. (Patient not taking: Reported on 3/12/2024) 30 mL 0    azithromycin (ZITHROMAX) 500 MG tablet One daily for yellow mucous, repeat if needed (Patient not taking: Reported on 3/28/2024) 3 tablet 3    cetirizine (ZYRTEC) 10 MG tablet Take 1 tablet (10 mg total) by mouth once daily. 30 tablet 0    fluticasone propionate (FLONASE) 50 mcg/actuation nasal spray 1 spray (50 mcg total) by Each Nostril route once daily. (Patient not taking: Reported on 3/28/2024) 15.8 mL 0     No current facility-administered medications on file prior to visit.       REVIEW OF SYSTEMS:  Review of Systems   Constitutional: Negative.    HENT: Negative.     Eyes: Negative.    Respiratory: Negative.     Cardiovascular: Negative.    Gastrointestinal: Negative.    Genitourinary: Negative.    Musculoskeletal:  Positive for joint pain.   Skin: Negative.    Neurological:  Positive for weakness.   Endo/Heme/Allergies: Negative.    Psychiatric/Behavioral: Negative.       GENERAL PHYSICAL EXAM:   LMP 03/14/2024 (Approximate)    GEN:  well developed, well nourished, no acute distress   HENT: Normocephalic, atraumatic   EYES: No discharge, conjunctiva normal   NECK: Supple, non-tender   PULM: No wheezing, no respiratory distress   CV: RRR   ABD: Soft, non-tender    ORTHO EXAM:   Examination of the left wrist and hand reveals that there is no edema.  There are no major skin changes.  Palpation does produce tenderness over the radiocarpal joint.  There is no tenderness over the TFCC or ulnar side of the wrist.  There is minimal to no tenderness over the volar wrist.  Wrist range of motion is extension of 90° and flexion of 90°.  She has full pronation and supination.  There was no significant instability of the scapholunate interval on Thacker's testing.  There was no significant mid carpal instability.  She does have a 2+ radial pulse and her sensation is grossly intact    RADIOLOGY:   MRI of the left wrist has been reviewed.  There are noted to be small amount of edema over the dorsum of the wrist.  There is also small area of ganglion.  This is very small.  There was no significant ligamentous disruption.  There was no obvious cartilaginous injuries noted.    ASSESSMENT:   Left wrist laxity with persistent pain    PLAN:  1. I have discussed treatment going forward.  I have discussed intermittent bracing but only for heavy activity     2. Also discussed a long-term therapy program.  I will start her working with occupational therapy.  We will do a 4 week program and then transition to a long-term home program     3. She will follow up with me in 8 weeks for repeat evaluation

## 2024-04-11 ENCOUNTER — PATIENT MESSAGE (OUTPATIENT)
Dept: CARDIOLOGY | Facility: CLINIC | Age: 29
End: 2024-04-11
Payer: COMMERCIAL

## 2024-04-11 ENCOUNTER — PATIENT MESSAGE (OUTPATIENT)
Dept: REHABILITATION | Facility: HOSPITAL | Age: 29
End: 2024-04-11
Payer: COMMERCIAL

## 2024-04-11 DIAGNOSIS — G90.A POTS (POSTURAL ORTHOSTATIC TACHYCARDIA SYNDROME): Primary | ICD-10-CM

## 2024-04-11 NOTE — PROGRESS NOTES
Physical Therapy Daily Treatment Note     Name: Valerie Cole  Clinic Number: [id number]    Therapy Diagnosis:   Encounter Diagnoses   Name Primary?    Posture abnormality Yes    Weakness of right upper extremity     Scapular dysfunction        Physician: Milagro Alexander*    Visit Date: 4/12/2024    Physician Orders: PT Eval and Treat  Medical Diagnosis from Referral: M25.50 (ICD-10-CM) - Arthralgia, unspecified joint  Evaluation Date: 11/10/2023  Authorization Period Expiration: 12/31/2024  Plan of Care Expiration: 5/20/2024  Progress Note Due: 5/5/2024  Visit # / Visits authorized: 14/20  FOTO: 1/ 3    1st FOTO Follow up: 12/28/2023   2nd FOTO Follow up:     Time In: 902am  Time Out: 1000am  Total Billable Time: 56 minutes    Precautions: Standard    Subjective     Pt reports: that her R shoulder has had that feeling of locking a few times over the past week. She also had the nerve type pain that would move down her arm a bit and there was even some pain in her forearm area a few times. She also had a lot of pain in her R SI joint area when standing and walking recently.   She was compliant with home exercise program.  Response to previous treatment: no change  Functional change: ongoing    Pain: 2/10  Location: right shoulder (deep inside)      Objective     Valerie received therapeutic exercises to develop strength, endurance, and ROM for 00 minutes including:      Valerie received the following manual therapy techniques: Joint mobilizations were applied to the: shoulder and thoracic spine for 08 minutes, including:    Side lying R SI joint manipulation, grade V  Cervical side glides to the R + R radial nerve glides, 2 x 10   Cervical side glides to the R + R median nerve glides, 2 x 10     Valerie participated in neuromuscular re-education activities to improve: Coordination, Kinesthetic, Sense, and Proprioception for 38 minutes. The following activities were included:    ER and IR walkouts with  "BTB with arm at 90 + overhead press, 2 x 10 with 3" holds   Chin tucks with biofeedback cuff, 15x with 10" holds  Chin tucks with biofeedback cuff + shoulder flexion to 90 with 4#, 15x on each side  Rhythmic stabilization from PT, 3 x 30" with arm at 90 and 120 degrees flexion  Side plank + side lying ER with 5#, 3 x 8 with 5" holds  Prone low trap level 3 on stability ball with 2#, 2 x 10  Prone mid trap level 3 on stability ball with 2#, 2 x 10  D1 flexion with 5# on cable column, 3 x 8  Serratus wall slides with RTB + lift off, 3 x 10 cueing for eccentric control    Not performed today:  Body blade, arm at 90, 4 x 30"  Robots on the wall with YTB around wrists, 2 x 8  Planks on elbows on stability ball, 4 x 20" holds    Valerie participated in dynamic functional therapeutic activities to improve functional performance for 10 minutes, including:    Pushup position on ground, alternating shoulder taps, 4 x 8  Overhead carries with 7#, 20 yards x5    Home Exercises Provided and Patient Education Provided     Education provided:   - HEP  - Plan of care  - Excessive mobility in multiple joints  - importance of improving strength and stability around the shoulder    Written Home Exercises Provided: Patient instructed to cont prior HEP.  Exercises were reviewed and Valerie was able to demonstrate them prior to the end of the session.  Valerie demonstrated good  understanding of the education provided.     See EMR under Patient Instructions for exercises provided  11/10/2023 .    Assessment     Valerie remains with her complaints of her R shoulder "locking" a few times over the past week. She is also still having nerve type symptoms down her R UE which were addressed with nerve glides while her cervical spine was being treated. Her excessive GH mobility seems to be causing some nerve irritation so joint stability remains a focus of her treatment at this time.     Valerie Is progressing well towards her goals.   Pt " prognosis is Good.     Pt will continue to benefit from skilled outpatient physical therapy to address the deficits listed in the problem list box on initial evaluation, provide pt/family education and to maximize pt's level of independence in the home and community environment.     Pt's spiritual, cultural and educational needs considered and pt agreeable to plan of care and goals.     Anticipated barriers to physical therapy: chronicity of symptoms, high Beighton index     Goals:   Short Term Goals (6 Weeks):   1. Pt will be independent with HEP to supplement PT in improving functional use of R UE. (MET)  2. Pt will display full and pain free shoulder flexion and abduction ROM to demonstrate improved GH joint mechanics. (Progressing)  3. Pt will increase R UE strength by 1/2 MMT grade to demonstrate improved functional strength. (MET)  Long Term Goals (12 Weeks):   1. Pt will improve FOTO score to </=75% to decrease perceived limitation with carrying, moving, and handling objects.(Progressing)  2. Pt will be able to lift 15# overhead without pain to demonstrate improved overhead strength. (Progressing)  3. Pt will increase R UE strength by 1 MMT grade to demonstrate improved functional strength. (Progressing)  4. Pt will be able to perform all dance activities without pain to demonstrate improved overall QOL. (Progressing)  5. Pt will be independent with gym routine to demonstrate improved ability to maintain strength outside of the clinic. (Progressing)    Plan     Plan of care Certification: 4/5/2024 to 5/20/2024.     Outpatient Physical Therapy 1 times weekly for 6 weeks to include the following interventions: Gait Training, Manual Therapy, Moist Heat/ Ice, Neuromuscular Re-ed, Patient Education, Self Care, Therapeutic Activities, and Therapeutic Exercise.     GEOVANNA ZARAGOZA, PT   Board Certified in Orthopedic Physical Therapy

## 2024-04-12 ENCOUNTER — CLINICAL SUPPORT (OUTPATIENT)
Dept: REHABILITATION | Facility: HOSPITAL | Age: 29
End: 2024-04-12
Payer: COMMERCIAL

## 2024-04-12 DIAGNOSIS — R29.3 POSTURE ABNORMALITY: Primary | ICD-10-CM

## 2024-04-12 DIAGNOSIS — M89.9 SCAPULAR DYSFUNCTION: ICD-10-CM

## 2024-04-12 DIAGNOSIS — R29.898 WEAKNESS OF RIGHT UPPER EXTREMITY: ICD-10-CM

## 2024-04-12 PROCEDURE — 97112 NEUROMUSCULAR REEDUCATION: CPT

## 2024-04-12 PROCEDURE — 97530 THERAPEUTIC ACTIVITIES: CPT

## 2024-04-12 PROCEDURE — 97140 MANUAL THERAPY 1/> REGIONS: CPT

## 2024-04-15 ENCOUNTER — HOSPITAL ENCOUNTER (OUTPATIENT)
Dept: CARDIOLOGY | Facility: HOSPITAL | Age: 29
Discharge: HOME | End: 2024-04-15
Attending: INTERNAL MEDICINE
Payer: COMMERCIAL

## 2024-04-15 ENCOUNTER — PATIENT MESSAGE (OUTPATIENT)
Dept: CARDIOLOGY | Facility: CLINIC | Age: 29
End: 2024-04-15
Payer: COMMERCIAL

## 2024-04-15 ENCOUNTER — PATIENT MESSAGE (OUTPATIENT)
Dept: PULMONOLOGY | Facility: CLINIC | Age: 29
End: 2024-04-15
Payer: COMMERCIAL

## 2024-04-15 DIAGNOSIS — G90.A POTS (POSTURAL ORTHOSTATIC TACHYCARDIA SYNDROME): ICD-10-CM

## 2024-04-15 DIAGNOSIS — J45.20 MILD INTERMITTENT ASTHMA WITHOUT COMPLICATION: ICD-10-CM

## 2024-04-15 PROCEDURE — 93225 XTRNL ECG REC<48 HRS REC: CPT

## 2024-04-15 PROCEDURE — 93010 ELECTROCARDIOGRAM REPORT: CPT | Mod: ,,, | Performed by: INTERNAL MEDICINE

## 2024-04-15 PROCEDURE — 93005 ELECTROCARDIOGRAM TRACING: CPT

## 2024-04-15 PROCEDURE — 93227 XTRNL ECG REC<48 HR R&I: CPT | Mod: ,,, | Performed by: INTERNAL MEDICINE

## 2024-04-15 RX ORDER — FLUTICASONE FUROATE, UMECLIDINIUM BROMIDE AND VILANTEROL TRIFENATATE 200; 62.5; 25 UG/1; UG/1; UG/1
1 POWDER RESPIRATORY (INHALATION) DAILY
Qty: 60 EACH | Refills: 11 | Status: SHIPPED | OUTPATIENT
Start: 2024-04-15

## 2024-04-17 LAB
OHS QRS DURATION: 88 MS
OHS QTC CALCULATION: 408 MS

## 2024-04-18 ENCOUNTER — LAB VISIT (OUTPATIENT)
Dept: LAB | Facility: HOSPITAL | Age: 29
End: 2024-04-18
Attending: INTERNAL MEDICINE
Payer: COMMERCIAL

## 2024-04-18 DIAGNOSIS — G90.A POTS (POSTURAL ORTHOSTATIC TACHYCARDIA SYNDROME): ICD-10-CM

## 2024-04-18 LAB
OHS CV EVENT MONITOR DAY: 0
OHS CV HOLTER LENGTH DECIMAL HOURS: 47.98
OHS CV HOLTER LENGTH HOURS: 47
OHS CV HOLTER LENGTH MINUTES: 59
OHS CV HOLTER SINUS AVERAGE HR: 81
OHS CV HOLTER SINUS MAX HR: 182
OHS CV HOLTER SINUS MIN HR: 50

## 2024-04-18 PROCEDURE — 84300 ASSAY OF URINE SODIUM: CPT | Performed by: INTERNAL MEDICINE

## 2024-04-18 PROCEDURE — 84133 ASSAY OF URINE POTASSIUM: CPT | Performed by: INTERNAL MEDICINE

## 2024-04-19 ENCOUNTER — CLINICAL SUPPORT (OUTPATIENT)
Dept: REHABILITATION | Facility: HOSPITAL | Age: 29
End: 2024-04-19
Payer: COMMERCIAL

## 2024-04-19 DIAGNOSIS — R29.3 POSTURE ABNORMALITY: Primary | ICD-10-CM

## 2024-04-19 DIAGNOSIS — R29.898 WEAKNESS OF RIGHT UPPER EXTREMITY: ICD-10-CM

## 2024-04-19 DIAGNOSIS — M89.9 SCAPULAR DYSFUNCTION: ICD-10-CM

## 2024-04-19 LAB
POTASSIUM 24H UR-SRATE: 0.8 MMOL/HR (ref 1–5)
POTASSIUM UR-SCNC: 15 MMOL/L (ref 15–95)
POTASSIUM URINE (MMOL/SPEC): 19.5 MMOL/SPEC
SODIUM 24H UR-SRATE: 4.2 MMOL/HR (ref 2–9)
SODIUM UR-SCNC: 78 MMOL/L (ref 20–250)
SODIUM URINE (MMOL/SPEC): 101 MMOL/SPEC
URINE COLLECTION DURATION: 24 HR
URINE COLLECTION DURATION: 24 HR
URINE VOLUME: 1300 ML
URINE VOLUME: 1300 ML

## 2024-04-19 PROCEDURE — 97112 NEUROMUSCULAR REEDUCATION: CPT

## 2024-04-19 PROCEDURE — 97530 THERAPEUTIC ACTIVITIES: CPT

## 2024-04-19 NOTE — PROGRESS NOTES
"  Physical Therapy Daily Treatment Note     Name: Valerie Cole  Clinic Number: [id number]    Therapy Diagnosis:   Encounter Diagnoses   Name Primary?    Posture abnormality Yes    Weakness of right upper extremity     Scapular dysfunction      Physician: Milagro Alexander*    Visit Date: 4/19/2024    Physician Orders: PT Eval and Treat  Medical Diagnosis from Referral: M25.50 (ICD-10-CM) - Arthralgia, unspecified joint  Evaluation Date: 11/10/2023  Authorization Period Expiration: 12/31/2024  Plan of Care Expiration: 5/20/2024  Progress Note Due: 5/5/2024  Visit # / Visits authorized: 15/20  FOTO: 1/ 3    1st FOTO Follow up: 12/28/2023   2nd FOTO Follow up:     Time In: 902am  Time Out: 955am  Total Billable Time: 50 minutes    Precautions: Standard    Subjective     Pt reports: that she has not had any of the nerve type pain recently. The shoulder has been feeling good overall and has only bothered her 1 or 2 times over the past week.   She was compliant with home exercise program.  Response to previous treatment: no change  Functional change: ongoing    Pain: 2/10  Location: right shoulder (deep inside)      Objective     Valerie received therapeutic exercises to develop strength, endurance, and ROM for 00 minutes including:      Valerie received the following manual therapy techniques: Joint mobilizations were applied to the: shoulder and thoracic spine for 00 minutes, including:    Not today:  Side lying R SI joint manipulation, grade V  Cervical side glides to the R + R radial nerve glides, 2 x 10   Cervical side glides to the R + R median nerve glides, 2 x 10     Valerie participated in neuromuscular re-education activities to improve: Coordination, Kinesthetic, Sense, and Proprioception for 40 minutes. The following activities were included:    ER and IR walkouts with BTB with arm at 90 + overhead press, 2 x 10 with 3" holds   Chin tucks with biofeedback cuff, 15x with 10" holds  Chin tucks with " "biofeedback cuff + shoulder flexion to 90 with 4#, 15x on each side  Rhythmic stabilization from PT, 3 x 30" with arm at 90 and 120 degrees flexion  Side plank + side lying ER with 5#, 3 x 8 with 5" holds  Body blade, arm at 90, 4 x 30"  Robots on the wall with YTB around wrists, 2 x 8  D1 flexion with 5# on cable column, 3 x 8  Serratus wall slides with GTB + lift off, 3 x 10 cueing for eccentric control    Not performed today:  Prone low trap level 3 on stability ball with 2#, 2 x 10  Prone mid trap level 3 on stability ball with 2#, 2 x 10  Planks on elbows on stability ball, 4 x 20" holds    Valerie participated in dynamic functional therapeutic activities to improve functional performance for 10 minutes, including:    Pushup position on ground + 5# KB pull across body, 4 x 8  Overhead carries with 7#, 20 yards x5    Home Exercises Provided and Patient Education Provided     Education provided:   - HEP  - Plan of care  - Excessive mobility in multiple joints  - importance of improving strength and stability around the shoulder    Written Home Exercises Provided: Patient instructed to cont prior HEP.  Exercises were reviewed and Valerie was able to demonstrate them prior to the end of the session.  Valerie demonstrated good  understanding of the education provided.     See EMR under Patient Instructions for exercises provided  11/10/2023 .    Assessment     Valerie presents today with no reports of pain at this time. She was educated to continue to use her radial her glides at home to avoid her nerve type symptoms from returning. During her session today she had a bout of dizziness following her closed chain exercises so further closed chain activities were limited. She was able to complete her session following this with good effort. Overhead strengthening continues to be progressed at this time.     Valerie Is progressing well towards her goals.   Pt prognosis is Good.     Pt will continue to benefit from " skilled outpatient physical therapy to address the deficits listed in the problem list box on initial evaluation, provide pt/family education and to maximize pt's level of independence in the home and community environment.     Pt's spiritual, cultural and educational needs considered and pt agreeable to plan of care and goals.     Anticipated barriers to physical therapy: chronicity of symptoms, high Beighton index     Goals:   Short Term Goals (6 Weeks):   1. Pt will be independent with HEP to supplement PT in improving functional use of R UE. (MET)  2. Pt will display full and pain free shoulder flexion and abduction ROM to demonstrate improved GH joint mechanics. (Progressing)  3. Pt will increase R UE strength by 1/2 MMT grade to demonstrate improved functional strength. (MET)  Long Term Goals (12 Weeks):   1. Pt will improve FOTO score to </=75% to decrease perceived limitation with carrying, moving, and handling objects.(Progressing)  2. Pt will be able to lift 15# overhead without pain to demonstrate improved overhead strength. (Progressing)  3. Pt will increase R UE strength by 1 MMT grade to demonstrate improved functional strength. (Progressing)  4. Pt will be able to perform all dance activities without pain to demonstrate improved overall QOL. (Progressing)  5. Pt will be independent with gym routine to demonstrate improved ability to maintain strength outside of the clinic. (Progressing)    Plan     Plan of care Certification: 4/5/2024 to 5/20/2024.     Outpatient Physical Therapy 1 times weekly for 6 weeks to include the following interventions: Gait Training, Manual Therapy, Moist Heat/ Ice, Neuromuscular Re-ed, Patient Education, Self Care, Therapeutic Activities, and Therapeutic Exercise.     GEOVANNA ZARAGOZA, PT   Board Certified in Orthopedic Physical Therapy

## 2024-04-26 ENCOUNTER — CLINICAL SUPPORT (OUTPATIENT)
Dept: REHABILITATION | Facility: HOSPITAL | Age: 29
End: 2024-04-26
Payer: COMMERCIAL

## 2024-04-26 DIAGNOSIS — M89.9 SCAPULAR DYSFUNCTION: ICD-10-CM

## 2024-04-26 DIAGNOSIS — R29.3 POSTURE ABNORMALITY: Primary | ICD-10-CM

## 2024-04-26 DIAGNOSIS — R29.898 WEAKNESS OF RIGHT UPPER EXTREMITY: ICD-10-CM

## 2024-04-26 PROCEDURE — 97112 NEUROMUSCULAR REEDUCATION: CPT

## 2024-04-26 NOTE — PROGRESS NOTES
Physical Therapy Daily Treatment Note     Name: Valerie Cole  Clinic Number: [id number]    Therapy Diagnosis:   Encounter Diagnoses   Name Primary?    Posture abnormality Yes    Weakness of right upper extremity     Scapular dysfunction        Physician: Milagro Alexander*    Visit Date: 4/26/2024    Physician Orders: PT Eval and Treat  Medical Diagnosis from Referral: M25.50 (ICD-10-CM) - Arthralgia, unspecified joint  Evaluation Date: 11/10/2023  Authorization Period Expiration: 12/31/2024  Plan of Care Expiration: 5/20/2024  Progress Note Due: 5/5/2024  Visit # / Visits authorized: 16/20  FOTO: 1/ 3    1st FOTO Follow up: 12/28/2023   2nd FOTO Follow up:     Time In: 901am  Time Out: 951am  Total Billable Time: 45 minutes    Precautions: Standard    Subjective     Pt reports: that the shoulder really did not bother her much over the past week until last night. She was getting up off the couch and put her R hand down to push herself up. Her slipped between couch cushions and she got a sharp pain in the R shoulder that is still present today.   She was compliant with home exercise program.  Response to previous treatment: no change  Functional change: ongoing    Pain: 2/10  Location: right shoulder (deep inside)      Objective     Valerie received therapeutic exercises to develop strength, endurance, and ROM for 00 minutes including:      Valerie received the following manual therapy techniques: Joint mobilizations were applied to the: shoulder and thoracic spine for 00 minutes, including:    Not today:  Side lying R SI joint manipulation, grade V  Cervical side glides to the R + R radial nerve glides, 2 x 10   Cervical side glides to the R + R median nerve glides, 2 x 10     Valerie participated in neuromuscular re-education activities to improve: Coordination, Kinesthetic, Sense, and Proprioception for 45 minutes. The following activities were included:    Manual joint centration + ER and IR  "isomerics, 10x each direction   ER and IR walkouts with BTB with arm at 0, 2 x 10 with 3" holds   Chin tucks with biofeedback cuff, 15x with 10" holds  Chin tucks with biofeedback cuff + shoulder flexion to 90 with 4#, 15x on each side  Rhythmic stabilization from PT, 3 x 30" with arm at 90 and 120 degrees flexion  Prone mid trap 3, 3 x 8  Prone low tral 3, 3 x 8  Side lying ER with 2#, 3 x 8 with 5" holds  Serratus wall slides, 3 x 10 cueing for eccentric control    Not performed today:  Prone low trap level 3 on stability ball with 2#, 2 x 10  Prone mid trap level 3 on stability ball with 2#, 2 x 10  Planks on elbows on stability ball, 4 x 20" holds  Body blade, arm at 90, 4 x 30"  Robots on the wall with YTB around wrists, 2 x 8  D1 flexion with 5# on cable column, 3 x 8    Valerie participated in dynamic functional therapeutic activities to improve functional performance for 00 minutes, including:    Pushup position on ground + 5# KB pull across body, 4 x 8  Overhead carries with 7#, 20 yards x5    Home Exercises Provided and Patient Education Provided     Education provided:   - HEP  - Plan of care  - Excessive mobility in multiple joints  - importance of improving strength and stability around the shoulder    Written Home Exercises Provided: Patient instructed to cont prior HEP.  Exercises were reviewed and Valerie was able to demonstrate them prior to the end of the session.  Valerie demonstrated good  understanding of the education provided.     See EMR under Patient Instructions for exercises provided  11/10/2023 .    Assessment     Valerie presents to PT today with some increased symptoms that began last night. Today she has having trouble flexing her R shoulder past 90 degrees without pain. Time was spent on manual rotator cuff activation today which did improve her AROM but she had trouble with loading exercises today. All exercises were kept within her pain free tolerance today.     Valerie Is " progressing well towards her goals.   Pt prognosis is Good.     Pt will continue to benefit from skilled outpatient physical therapy to address the deficits listed in the problem list box on initial evaluation, provide pt/family education and to maximize pt's level of independence in the home and community environment.     Pt's spiritual, cultural and educational needs considered and pt agreeable to plan of care and goals.     Anticipated barriers to physical therapy: chronicity of symptoms, high Beighton index     Goals:   Short Term Goals (6 Weeks):   1. Pt will be independent with HEP to supplement PT in improving functional use of R UE. (MET)  2. Pt will display full and pain free shoulder flexion and abduction ROM to demonstrate improved GH joint mechanics. (Progressing)  3. Pt will increase R UE strength by 1/2 MMT grade to demonstrate improved functional strength. (MET)  Long Term Goals (12 Weeks):   1. Pt will improve FOTO score to </=75% to decrease perceived limitation with carrying, moving, and handling objects.(Progressing)  2. Pt will be able to lift 15# overhead without pain to demonstrate improved overhead strength. (Progressing)  3. Pt will increase R UE strength by 1 MMT grade to demonstrate improved functional strength. (Progressing)  4. Pt will be able to perform all dance activities without pain to demonstrate improved overall QOL. (Progressing)  5. Pt will be independent with gym routine to demonstrate improved ability to maintain strength outside of the clinic. (Progressing)    Plan     Plan of care Certification: 4/5/2024 to 5/20/2024.     Outpatient Physical Therapy 1 times weekly for 6 weeks to include the following interventions: Gait Training, Manual Therapy, Moist Heat/ Ice, Neuromuscular Re-ed, Patient Education, Self Care, Therapeutic Activities, and Therapeutic Exercise.     GEOVANNA ZARAGOZA, PT   Board Certified in Orthopedic Physical Therapy

## 2024-05-02 ENCOUNTER — PATIENT MESSAGE (OUTPATIENT)
Dept: CARDIOLOGY | Facility: CLINIC | Age: 29
End: 2024-05-02
Payer: COMMERCIAL

## 2024-05-03 ENCOUNTER — CLINICAL SUPPORT (OUTPATIENT)
Dept: REHABILITATION | Facility: HOSPITAL | Age: 29
End: 2024-05-03
Payer: COMMERCIAL

## 2024-05-03 DIAGNOSIS — R29.3 POSTURE ABNORMALITY: Primary | ICD-10-CM

## 2024-05-03 DIAGNOSIS — M89.9 SCAPULAR DYSFUNCTION: ICD-10-CM

## 2024-05-03 DIAGNOSIS — R29.898 WEAKNESS OF RIGHT UPPER EXTREMITY: ICD-10-CM

## 2024-05-03 PROCEDURE — 97112 NEUROMUSCULAR REEDUCATION: CPT

## 2024-05-03 NOTE — PROGRESS NOTES
"  Physical Therapy Daily Treatment Note     Name: Valerie Cole  Clinic Number: [id number]    Therapy Diagnosis:   Encounter Diagnoses   Name Primary?    Posture abnormality Yes    Weakness of right upper extremity     Scapular dysfunction      Physician: Milagro Alexander*    Visit Date: 5/3/2024    Physician Orders: PT Eval and Treat  Medical Diagnosis from Referral: M25.50 (ICD-10-CM) - Arthralgia, unspecified joint  Evaluation Date: 11/10/2023  Authorization Period Expiration: 12/31/2024  Plan of Care Expiration: 5/20/2024  Progress Note Due: 5/5/2024  Visit # / Visits authorized: 17/20  FOTO: 1/ 3    1st FOTO Follow up: 12/28/2023   2nd FOTO Follow up:     Time In: 908am  Time Out: 958am  Total Billable Time: 45 minutes    Precautions: Standard    Subjective     Pt reports: that her shoulder is better than last week after her flare up. Still having trouble with closed chain exercises.   She was compliant with home exercise program.  Response to previous treatment: no change  Functional change: ongoing    Pain: 2/10  Location: right shoulder (deep inside)      Objective     Valerie received therapeutic exercises to develop strength, endurance, and ROM for 00 minutes including:      Valerie received the following manual therapy techniques: Joint mobilizations were applied to the: shoulder and thoracic spine for 00 minutes, including:      Valerie participated in neuromuscular re-education activities to improve: Coordination, Kinesthetic, Sense, and Proprioception for 45 minutes. The following activities were included:    Manual joint centration + ER and IR isomerics, 10x each direction   ER and IR walkouts with BTB with arm at 0, 2 x 10 with 3" holds   Chin tucks with biofeedback cuff, 15x with 10" holds  Chin tucks with biofeedback cuff + shoulder flexion to 90 with 2#, 15x on each side  Rhythmic stabilization from PT, 3 x 30" with arm at 90 and 120 degrees flexion  Prone mid trap 3, 3 x 8  Prone low " "trap 3, 3 x 8  Side lying ER with 3#, 3 x 8 with 5" holds  Serratus wall slides, 3 x 10 cueing for eccentric control    Not performed today:  Prone low trap level 3 on stability ball with 2#, 2 x 10  Prone mid trap level 3 on stability ball with 2#, 2 x 10  Planks on elbows on stability ball, 4 x 20" holds  Body blade, arm at 90, 4 x 30"  Robots on the wall with YTB around wrists, 2 x 8  D1 flexion with 5# on cable column, 3 x 8    Valerie participated in dynamic functional therapeutic activities to improve functional performance for 00 minutes, including:    Pushup position on ground + 5# KB pull across body, 4 x 8  Overhead carries with 7#, 20 yards x5    Home Exercises Provided and Patient Education Provided     Education provided:   - HEP  - Plan of care  - Excessive mobility in multiple joints  - importance of improving strength and stability around the shoulder    Written Home Exercises Provided: Patient instructed to cont prior HEP.  Exercises were reviewed and Valerie was able to demonstrate them prior to the end of the session.  Valerie demonstrated good  understanding of the education provided.     See EMR under Patient Instructions for exercises provided  11/10/2023 .    Assessment     Valerie presents today with improving symptoms compared to last week after a flare up but is still having trouble with overhead and closed chain activities. She continues to be challenged with rotator cuff activation and periscapular muscle retraining within her tolerance today. Deep neck flexor endurance training continues to be performed at this time.     Valerie Is progressing well towards her goals.   Pt prognosis is Good.     Pt will continue to benefit from skilled outpatient physical therapy to address the deficits listed in the problem list box on initial evaluation, provide pt/family education and to maximize pt's level of independence in the home and community environment.     Pt's spiritual, cultural and " educational needs considered and pt agreeable to plan of care and goals.     Anticipated barriers to physical therapy: chronicity of symptoms, high Beighton index     Goals:   Short Term Goals (6 Weeks):   1. Pt will be independent with HEP to supplement PT in improving functional use of R UE. (MET)  2. Pt will display full and pain free shoulder flexion and abduction ROM to demonstrate improved GH joint mechanics. (Progressing)  3. Pt will increase R UE strength by 1/2 MMT grade to demonstrate improved functional strength. (MET)  Long Term Goals (12 Weeks):   1. Pt will improve FOTO score to </=75% to decrease perceived limitation with carrying, moving, and handling objects.(Progressing)  2. Pt will be able to lift 15# overhead without pain to demonstrate improved overhead strength. (Progressing)  3. Pt will increase R UE strength by 1 MMT grade to demonstrate improved functional strength. (Progressing)  4. Pt will be able to perform all dance activities without pain to demonstrate improved overall QOL. (Progressing)  5. Pt will be independent with gym routine to demonstrate improved ability to maintain strength outside of the clinic. (Progressing)    Plan     Plan of care Certification: 4/5/2024 to 5/20/2024.     Outpatient Physical Therapy 1 times weekly for 6 weeks to include the following interventions: Gait Training, Manual Therapy, Moist Heat/ Ice, Neuromuscular Re-ed, Patient Education, Self Care, Therapeutic Activities, and Therapeutic Exercise.     GEOVANNA ZARAGOZA, PT   Board Certified in Orthopedic Physical Therapy

## 2024-05-08 NOTE — PROGRESS NOTES
"  Physical Therapy Daily Treatment Note     Name: Valerie Cole  Clinic Number: [id number]    Therapy Diagnosis:   Encounter Diagnoses   Name Primary?    Posture abnormality Yes    Weakness of right upper extremity     Scapular dysfunction        Physician: Milagro Alexander*    Visit Date: 5/9/2024    Physician Orders: PT Eval and Treat  Medical Diagnosis from Referral: M25.50 (ICD-10-CM) - Arthralgia, unspecified joint  Evaluation Date: 11/10/2023  Authorization Period Expiration: 12/31/2024  Plan of Care Expiration: 5/20/2024  Progress Note Due: 5/5/2024  Visit # / Visits authorized: 18/20  FOTO: 1/ 3    1st FOTO Follow up: 12/28/2023   2nd FOTO Follow up:     Time In: 910am  Time Out: 1000am  Total Billable Time: 50 minutes    Precautions: Standard    Subjective     Pt reports: that for the most part her shoulder has felt good over the past week except for a few times.   She was compliant with home exercise program.  Response to previous treatment: no change  Functional change: ongoing    Pain: 2/10  Location: right shoulder (deep inside)      Objective     Valerie received therapeutic exercises to develop strength, endurance, and ROM for 00 minutes including:      Valerie received the following manual therapy techniques: Joint mobilizations were applied to the: shoulder and thoracic spine for 00 minutes, including:      Valerie participated in neuromuscular re-education activities to improve: Coordination, Kinesthetic, Sense, and Proprioception for 40 minutes. The following activities were included:    Manual joint centration + ER and IR isomerics, 10x each direction   ER and IR walkouts with BTB with arm at 0, 2 x 10 with 3" holds   Rhythmic stabilization from PT, 3 x 30" with arm at 90 and 120 degrees flexion  Side lying ER with 4#, 3 x 8 with 5" holds  Serratus wall slides with RTB + lift off, 3 x 10 cueing for eccentric control  Prone low trap level 3 on stability ball, 2 x 10  Prone mid trap " "level 3 on stability ball, 2 x 10  Self rhythmic stabilization with RTB, arm at 0 and 90, 3 x 30" each    Not performed today:  Chin tucks with biofeedback cuff, 15x with 10" holds  Chin tucks with biofeedback cuff + shoulder flexion to 90 with 2#, 15x on each side  Planks on elbows on stability ball, 4 x 20" holds  Robots on the wall with YTB around wrists, 2 x 8    Valerie participated in dynamic functional therapeutic activities to improve functional performance for 10 minutes, including:    Pushup position on ground + alternating shoulder taps, 4 x 8  Overhead carries with 7#, 20 yards x5    Home Exercises Provided and Patient Education Provided     Education provided:   - HEP  - Plan of care  - Excessive mobility in multiple joints  - importance of improving strength and stability around the shoulder    Written Home Exercises Provided: Patient instructed to cont prior HEP.  Exercises were reviewed and Valerie was able to demonstrate them prior to the end of the session.  Valerie demonstrated good  understanding of the education provided.     See EMR under Patient Instructions for exercises provided  11/10/2023 .    Assessment     Valerie has maintained improved symptoms over the pat week with only minor flare ups. Today she was able to be returned to closed chain stability and overhead lifting without any symptoms. Periscapular strengthening continues to challenge her at this time.     Valerie Is progressing well towards her goals.   Pt prognosis is Good.     Pt will continue to benefit from skilled outpatient physical therapy to address the deficits listed in the problem list box on initial evaluation, provide pt/family education and to maximize pt's level of independence in the home and community environment.     Pt's spiritual, cultural and educational needs considered and pt agreeable to plan of care and goals.     Anticipated barriers to physical therapy: chronicity of symptoms, high Beighton index "     Goals:   Short Term Goals (6 Weeks):   1. Pt will be independent with HEP to supplement PT in improving functional use of R UE. (MET)  2. Pt will display full and pain free shoulder flexion and abduction ROM to demonstrate improved GH joint mechanics. (Progressing)  3. Pt will increase R UE strength by 1/2 MMT grade to demonstrate improved functional strength. (MET)  Long Term Goals (12 Weeks):   1. Pt will improve FOTO score to </=75% to decrease perceived limitation with carrying, moving, and handling objects.(Progressing)  2. Pt will be able to lift 15# overhead without pain to demonstrate improved overhead strength. (Progressing)  3. Pt will increase R UE strength by 1 MMT grade to demonstrate improved functional strength. (Progressing)  4. Pt will be able to perform all dance activities without pain to demonstrate improved overall QOL. (Progressing)  5. Pt will be independent with gym routine to demonstrate improved ability to maintain strength outside of the clinic. (Progressing)    Plan     Plan of care Certification: 4/5/2024 to 5/20/2024.     Outpatient Physical Therapy 1 times weekly for 6 weeks to include the following interventions: Gait Training, Manual Therapy, Moist Heat/ Ice, Neuromuscular Re-ed, Patient Education, Self Care, Therapeutic Activities, and Therapeutic Exercise.     GEOVANNA ZARAGOZA, PT   Board Certified in Orthopedic Physical Therapy

## 2024-05-09 ENCOUNTER — CLINICAL SUPPORT (OUTPATIENT)
Dept: REHABILITATION | Facility: HOSPITAL | Age: 29
End: 2024-05-09
Payer: COMMERCIAL

## 2024-05-09 ENCOUNTER — PATIENT MESSAGE (OUTPATIENT)
Dept: REHABILITATION | Facility: HOSPITAL | Age: 29
End: 2024-05-09

## 2024-05-09 DIAGNOSIS — R29.3 POSTURE ABNORMALITY: Primary | ICD-10-CM

## 2024-05-09 DIAGNOSIS — M89.9 SCAPULAR DYSFUNCTION: ICD-10-CM

## 2024-05-09 DIAGNOSIS — R29.898 WEAKNESS OF RIGHT UPPER EXTREMITY: ICD-10-CM

## 2024-05-09 PROCEDURE — 97530 THERAPEUTIC ACTIVITIES: CPT

## 2024-05-09 PROCEDURE — 97112 NEUROMUSCULAR REEDUCATION: CPT

## 2024-05-15 ENCOUNTER — CLINICAL SUPPORT (OUTPATIENT)
Dept: REHABILITATION | Facility: HOSPITAL | Age: 29
End: 2024-05-15
Payer: COMMERCIAL

## 2024-05-15 DIAGNOSIS — M25.539 MOVEMENTS OF WRIST WORSEN PAIN: Primary | ICD-10-CM

## 2024-05-15 DIAGNOSIS — R29.898 REDUCED HAND STRENGTH: ICD-10-CM

## 2024-05-15 PROCEDURE — 97022 WHIRLPOOL THERAPY: CPT

## 2024-05-15 PROCEDURE — 97165 OT EVAL LOW COMPLEX 30 MIN: CPT

## 2024-05-15 PROCEDURE — 97110 THERAPEUTIC EXERCISES: CPT

## 2024-05-15 NOTE — PATIENT INSTRUCTIONS
[facility], OCCUPATIONAL THERAPY  HOME EXERCISE PROGRAM     Complete the following strengthening exercises 2x/day.  Hold each position x3 seconds then relax. Complete 10 repetitions each.     Resisted Wrist Extension   With forearm resting palm down, resist upward movement   of hand with other hand.       Resisted Wrist Flexion   With forearm resting palm up, resist upward movement   of hand with other hand.       Resisted Wrist Radial Deviation  With forearm resting with thumb up, use other hand to   resist upward movement of hand at wrist.       Resisted Wrist Ulnar Deviation  With forearm resting thumb up, use other hand to resist   downward movement of hand at wrist.    Copyright © I. All rights reserved.     Dart-thrower's Motion        Holding pen, move wrist as you were throwing a dart lightly, repeat 10 times, pain free    [facility]  OCCUPATIONAL THERAPY  HOME EXERCISE PROGRAM     Complete the following strengthening program 1x/day.        2 min    2 min    5 reps

## 2024-05-15 NOTE — PLAN OF CARE
[facility]  Occupational Therapy Initial Evaluation    Date: 5/15/2024  Name: Valerie Cole  Clinic Number: [id number]    Therapy Diagnosis:   Encounter Diagnoses   Name Primary?    Movements of wrist worsen pain Yes    Reduced hand strength      Physician: Milagro Alexander*    Physician Orders: OT Eval and Treat; Please begin therapy to the left wrist. Include strengthening and stabilization. Please transition to a long-term home strengthening and stabilization program at the end of the therapy visits. 2 times per week, 4 weeks   Medical Diagnosis: M24.232 (ICD-10-CM) - Extensor tendon ligament laxity of wrist, left (Left wrist hyperlaxity with pain)  Surgical Procedure and Date: N/A / Date of Injury/Onset: progressive onset, over a year  Evaluation Date: 5/15/2024  Insurance Authorization Period Expiration: 12/31/2024  Plan of Care Expiration: 8/9/24 (12 weeks)   Date of Return to MD: 6/5/2024   Visit # / Visits authorized: 1 / 1  FOTO: completed     Precautions:  Standard    Time In: 9:05 am  Time Out: 10:00 am  Total Appointment Time (timed & untimed codes): 55 minutes    SUBJECTIVE     Date of Onset: progressive onset, over a year    History of Current Condition/Mechanism of Injury: Valerie reports: left wrist pain persistent over a year, present less than two years. Feels like wrist is going to give out and pain radiates. 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 States she can't bear weight through wrist due to pain. Reports difficulty opening jars and water bottles. She has a history of hypermobility and EDS. Pain is described as burning, radiating, and pulsating. She states making a tight fist is painful and she is dropping items often. There was noted to be a ganglion cyst on MRI.     Falls: none    Involved Side: Left  Dominant Side: Right  Imaging: Reviewed   Prior Therapy: none  Occupation:    Working presently:  employed  Duties: computer work    Functional Limitations/Social History:    Previous functional status includes: Independent with all ADLs and IADLs.     Current Functional Status   Home/Living environment: lives with their spouse      Limitation of Functional Status as follows:   ADLs/IADLs: Pt requires assistance for opening jars and bottles; carrying heavy items such as groceries.     - Feeding: Mod I favoring R hand    - Bathing: Mod I favoring R hand    - Dressing/Grooming: Mod I favoring R hand    - Driving: Mod I favoring R hand     Leisure: Needlework/Sewing, dance, painting, building/crafting     Pain:  Functional Pain Scale Rating 0-10: Current 0/10, worst 7/10, best 0/10   Location: L wrist   Description: Aching, Throbbing, Deep, and radiating, pulsating   Aggravating Factors: Lifting and weightbearing   Easing Factors: pain medication and rest     Patient's Goals for Therapy: be able to carry things easier and open items independently     Medical History:   Past Medical History:   Diagnosis Date    Asthma     GERD (gastroesophageal reflux disease)     Headache     Kidney stones      Surgical History:    has no past surgical history on file.    Medications:   has a current medication list which includes the following prescription(s): albuterol, albuterol-budesonide, amitriptyline, azelastine, azithromycin, cetirizine, ergocalciferol (vitamin d2), fluticasone propionate, trelegy ellipta, lactobac no.41/bifidobact no.7, montelukast, multivitamin, mupirocin, omeprazole, ondansetron, prednisone, promethazine-dextromethorphan, tacrolimus, and ubrelvy.    Allergies:   Review of patient's allergies indicates:  No Known Allergies     OBJECTIVE     Observation/Appearance: L volar wrist TTP.     Edema. None noted.     Elbow and Wrist ROM. WFLs - all planes of motion. L wrist painful with extension and RD.     Hand ROM. WFLs - Pt able to make full composite fists with B/L hands and demonstrates thumb opposition  touch to SF DPC.      Strength (Dynamometer) and Pinch Strength (Pinch Gauge)  Measured in pounds to POP.   5/15/2024 5/15/2024    Left Right   Rung II 38 73   Jolly Pinch 11.5 13     Sensation. Pt endorses intermittent tingling in B/L ulnar fingers. Denies numbness.     Manual Muscle Test   5/15/2024    Left   Wrist Extension  5/5   Wrist Flexion 5/5   Radial Deviation 5/5   Ulnar Deviation 5/5   Supination 5/5   Pronation 5/5     Limitation/Restriction for FOTO Wrist Survey    Therapist reviewed FOTO scores for Valerie Cole on 5/15/2024.   FOTO documents entered into [employer] - see Media section.    Limitation Score: 43%       Treatment   Total Treatment time (time-based codes) separate from Evaluation: 20 minutes    Valerie received the treatments listed below:     Supervised modalities after being cleared for contradictions: Fluidotherapy - 115 degrees and 50 speed, to L hand/wrist for 12 minutes to increase blood flow and circulation, desensitization and sensory re-education, pain management, and increased tissue extensibility prior to therex. Pt instructed on performing active range of motion exercises while receiving heat to increase active motion.     Therapeutic exercises to develop strength, endurance, ROM, and flexibility for 20 minutes, including:  - AROM in fluidotherapy x 12 min  - Wrist isometrics 4 ways   - Dart-thrower's motion, pain-free range  - Theraputty HEP, yellow: molding, gripping, EDC ext     Patient Education and Home Exercises      Education provided:   - Role of OT and POC  - HEP     Written Home Exercises Provided: yes.  Exercises were reviewed and Valerie was able to demonstrate them prior to the end of the session.  Valerie demonstrated good  understanding of the education provided. See EMR under Patient Instructions for exercises provided during therapy sessions.     Pt was advised to perform these exercises free of pain, and to stop performing them if pain  occurs.    Patient/Family Education: role of OT, goals for OT, scheduling/cancellations - pt verbalized understanding. Discussed insurance limitations with patient.    ASSESSMENT     Valerie Cole is a 28 y.o. female referred to outpatient occupational therapy and presents with a medical diagnosis of extensor tendon ligament laxity of wrist, left. Patient presents with the following therapy deficits: Decreased  strength, Decreased muscle strength, Decreased functional hand use, Increased pain, Diminished/Impaired Sensation, and Diminished/Impaired Coordination and demonstrates limitations as described in the chart below. Following medical record review it is determined that pt will benefit from occupational therapy services in order to maximize pain free and/or functional use of left hand. The following goals were discussed with the patient and patient is in agreement with them as to be addressed in the treatment plan. The patient's rehab potential is Good.     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    Profile and History Assessment of Occupational Performance Level of Clinical Decision Making Complexity Score   Occupational Profile:   Valerie Cole is a 28 y.o. female who lives with their spouse and is currently employed Valerie Cole has difficulty with  ADLs and IADLs as listed previously, which  Affecting herdaily functional abilities.      Comorbidities:    has a past medical history of Asthma, GERD (gastroesophageal reflux disease), Headache, and Kidney stones.    Medical and Therapy History Review:   Brief               Performance Deficits    Physical:  Joint Stability  Muscle Power/Strength  Muscle Endurance   Strength  Proprioception Functions  Tactile Functions  Pain    Cognitive:  No Deficits    Psychosocial:    Habits  Routines  Rituals     Clinical Decision  Making:  low    Assessment Process:  Problem-Focused Assessments    Modification/Need for Assistance:  Minimal-Moderate Modifications/Assistance    Intervention Selection:  Limited Treatment Options       low  Based on PMHX, co morbidities , data from assessments and functional level of assistance required with task and clinical presentation directly impacting function.         Goals:   The following goals were discussed with the patient and patient is in agreement with them as to be addressed in the treatment plan.   Short Term Goals (STGs) = Long Term Goals (LTGs); to be met by discharge.  LTG #1: Pt will report a pain level of 4-5 out of 10 at worst with daily hand use.   LTG #2: Pt will demo improved FOTO score by 5-10 points.   LTG #3: Pt will increase left hand  strength by at least 5-10 lbs needed to open jars and carry groceries.   LTG #4: Pt will demonstrate independence with issued HEP, brace wear as needed, and modalities for pain/symptom management.    PLAN   Plan of Care Certification: 5/15/2024 to 8/9/24 (12 weeks).     Outpatient Occupational Therapy 1-2 times weekly for 12 weeks to include the following interventions: Paraffin, Fluidotherapy, Manual therapy/joint mobilizations, Modalities for pain management, US 3 mhz, Therapeutic exercises/activities., Iontophoresis with 2.0 cc Dexamethasone, Strengthening, Orthotic Fabrication/Fit/Training, Edema Control, Electrical Modalities, Joint Protection, and Energy Conservation.      Aruna Gonzales, OTR/L      I CERTIFY THE NEED FOR THESE SERVICES FURNISHED UNDER THIS PLAN OF TREATMENT AND WHILE UNDER MY CARE  Physician's comments:      Physician's Signature: ___________________________________________________

## 2024-05-15 NOTE — PROGRESS NOTES
[facility]: Occupational Therapy Note     See plan of care for full initial OT evaluation.    Aruna Gonzales, MARGARET, OTR/L

## 2024-05-17 ENCOUNTER — CLINICAL SUPPORT (OUTPATIENT)
Dept: REHABILITATION | Facility: HOSPITAL | Age: 29
End: 2024-05-17
Payer: COMMERCIAL

## 2024-05-17 DIAGNOSIS — M89.9 SCAPULAR DYSFUNCTION: ICD-10-CM

## 2024-05-17 DIAGNOSIS — R29.3 POSTURE ABNORMALITY: Primary | ICD-10-CM

## 2024-05-17 DIAGNOSIS — R29.898 WEAKNESS OF RIGHT UPPER EXTREMITY: ICD-10-CM

## 2024-05-17 PROCEDURE — 97112 NEUROMUSCULAR REEDUCATION: CPT

## 2024-05-17 PROCEDURE — 97140 MANUAL THERAPY 1/> REGIONS: CPT

## 2024-05-17 PROCEDURE — 97110 THERAPEUTIC EXERCISES: CPT

## 2024-05-17 NOTE — PROGRESS NOTES
Physical Therapy Daily Treatment Note     Name: Valerie Cole  Clinic Number: [id number]    Therapy Diagnosis:   No diagnosis found.      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 She felt like she jammed her R shoulder and had some nerve type pain moving down the back of the R arm. She also feels like the jammed her R upper and low back and has been having a lot of pain on the R side of her body every since. She is very frustrated with how often she is in pain.   She was compliant with home exercise program.  Response to previous treatment: no change  Functional change: ongoing    Pain: 2/10  Location: right shoulder (deep inside)      Objective     Valerie received therapeutic exercises to develop strength, endurance, and ROM for 10 minutes including:    SL rib rolls, 2 x 10 on each side  Seated thoracic extension over a chair (attempted but unable to perform)  Seated lumbar flexion with stability ball, 3 x 8    Valerie received the following manual therapy techniques: Joint mobilizations were applied to the: shoulder and thoracic spine for 15 minutes, including:    Prone thoracic spine PA mobs, grade IV and IV  R GH posterior and inferior mobs, grade II  Side lying R lumbar gapping manipulation, grade V  Cervical spine A-P mobs + radial nerve glides on the R, 2 x 10    Valerie participated in neuromuscular  "re-education activities to improve: Coordination, Kinesthetic, Sense, and Proprioception for 25 minutes. The following activities were included:    Manual joint centration + ER and IR isomerics, 10x each direction   ER and IR walkouts with GTB with arm at 0, 2 x 10 with 3" holds   Rhythmic stabilization from PT, 3 x 30" with arm at 90 and 120 degrees flexion  Serratus wall slides, 3 x 8  Chin tucks with biofeedback cuff, 15x with 10" holds  Chin tucks with biofeedback cuff + shoulder flexion to 90 with 2#, 15x on each side    Not performed today:  Prone low trap level 3 on stability ball, 2 x 10  Prone mid trap level 3 on stability ball, 2 x 10  Self rhythmic stabilization with RTB, arm at 0 and 90, 3 x 30" each  Side lying ER with 4#, 3 x 8 with 5" holds  Planks on elbows on stability ball, 4 x 20" holds  Robots on the wall with YTB around wrists, 2 x 8    Valerie participated in dynamic functional therapeutic activities to improve functional performance for 00 minutes, including:    Not today:  Pushup position on ground + alternating shoulder taps, 4 x 8  Overhead carries with 7#, 20 yards x5    Home Exercises Provided and Patient Education Provided     Education provided:   - HEP  - Plan of care  - Excessive mobility in multiple joints  - importance of improving strength and stability around the shoulder    Written Home Exercises Provided: Patient instructed to cont prior HEP.  Exercises were reviewed and Valerie was able to demonstrate them prior to the end of the session.  Valerie demonstrated good  understanding of the education provided.     See EMR under Patient Instructions for exercises provided  11/10/2023 .    Assessment     Valerie presents to PT today with an acute exacerbation of the R shoulder pain, as well as R mid thoracic and low lumbar spine pain. Increased time was spent on manual interventions today to address her areas of pain to try to decrease her symptoms. At 1 point her pain moved her " to tears and PT educated her on pain relieving tactics over the next few days. She was also educated on her underlying hypermobility and how it predisposes these types of things to happen. Exercises today were regressed and kept within her pain free tolerance.     Valerie Is progressing well towards her goals.   Pt prognosis is Good.     Pt will continue to benefit from skilled outpatient physical therapy to address the deficits listed in the problem list box on initial evaluation, provide pt/family education and to maximize pt's level of independence in the home and community environment.     Pt's spiritual, cultural and educational needs considered and pt agreeable to plan of care and goals.     Anticipated barriers to physical therapy: chronicity of symptoms, high Beighton index     Goals:   Short Term Goals (6 Weeks):   1. Pt will be independent with HEP to supplement PT in improving functional use of R UE. (MET)  2. Pt will display full and pain free shoulder flexion and abduction ROM to demonstrate improved GH joint mechanics. (Progressing)  3. Pt will increase R UE strength by 1/2 MMT grade to demonstrate improved functional strength. (MET)  Long Term Goals (12 Weeks):   1. Pt will improve FOTO score to </=75% to decrease perceived limitation with carrying, moving, and handling objects.(Progressing)  2. Pt will be able to lift 15# overhead without pain to demonstrate improved overhead strength. (Progressing)  3. Pt will increase R UE strength by 1 MMT grade to demonstrate improved functional strength. (Progressing)  4. Pt will be able to perform all dance activities without pain to demonstrate improved overall QOL. (Progressing)  5. Pt will be independent with gym routine to demonstrate improved ability to maintain strength outside of the clinic. (Progressing)    Plan     Continue to progress per POC.     GEOVANNA ZARAGOZA, PT   Board Certified in Orthopedic Physical Therapy

## 2024-05-21 ENCOUNTER — OFFICE VISIT (OUTPATIENT)
Dept: NEUROLOGY | Facility: CLINIC | Age: 29
End: 2024-05-21
Payer: COMMERCIAL

## 2024-05-21 VITALS
TEMPERATURE: 98 F | BODY MASS INDEX: 22.66 KG/M2 | RESPIRATION RATE: 17 BRPM | HEART RATE: 90 BPM | SYSTOLIC BLOOD PRESSURE: 122 MMHG | WEIGHT: 158.31 LBS | DIASTOLIC BLOOD PRESSURE: 78 MMHG | HEIGHT: 70 IN

## 2024-05-21 DIAGNOSIS — R11.0 NAUSEA: ICD-10-CM

## 2024-05-21 DIAGNOSIS — G43.909 EPISODIC MIGRAINE: Primary | ICD-10-CM

## 2024-05-21 PROCEDURE — 99213 OFFICE O/P EST LOW 20 MIN: CPT | Mod: S$GLB,,, | Performed by: PHYSICIAN ASSISTANT

## 2024-05-21 PROCEDURE — 1160F RVW MEDS BY RX/DR IN RCRD: CPT | Mod: CPTII,S$GLB,, | Performed by: PHYSICIAN ASSISTANT

## 2024-05-21 PROCEDURE — 99999 PR PBB SHADOW E&M-EST. PATIENT-LVL V: CPT | Mod: PBBFAC,,, | Performed by: PHYSICIAN ASSISTANT

## 2024-05-21 PROCEDURE — 3074F SYST BP LT 130 MM HG: CPT | Mod: CPTII,S$GLB,, | Performed by: PHYSICIAN ASSISTANT

## 2024-05-21 PROCEDURE — 3008F BODY MASS INDEX DOCD: CPT | Mod: CPTII,S$GLB,, | Performed by: PHYSICIAN ASSISTANT

## 2024-05-21 PROCEDURE — 1159F MED LIST DOCD IN RCRD: CPT | Mod: CPTII,S$GLB,, | Performed by: PHYSICIAN ASSISTANT

## 2024-05-21 PROCEDURE — 3078F DIAST BP <80 MM HG: CPT | Mod: CPTII,S$GLB,, | Performed by: PHYSICIAN ASSISTANT

## 2024-05-21 RX ORDER — AMITRIPTYLINE HYDROCHLORIDE 25 MG/1
TABLET, FILM COATED ORAL
Qty: 30 TABLET | Refills: 6 | Status: SHIPPED | OUTPATIENT
Start: 2024-05-21

## 2024-05-21 RX ORDER — ONDANSETRON 4 MG/1
4 TABLET, ORALLY DISINTEGRATING ORAL EVERY 8 HOURS PRN
Qty: 20 TABLET | Refills: 3 | Status: SHIPPED | OUTPATIENT
Start: 2024-05-21

## 2024-05-21 NOTE — PROGRESS NOTES
[facility]  [address] [facility] [address]  [place] [address]  Phone:[phone]  Fax: [phone]  Follow up visit     Patient Name: Valerie Cole  : 1995  MRN:  [id number]  Today: 2024   LOV: 2024  chief complaint: Headache    PCP: Christoph Watson MD.       Assessment:   Valerie Cole is a 28 y.o. right handed female with a PMHx of: asthma, GERD, and nephrolithiasis   whom presents solo in follow up. HA appearing to be both migrainous and tension quality, ---also---better on current regimen.      Review:    ICD-10-CM ICD-9-CM   1. Episodic migraine  G43.909 346.90   2. Nausea  R11.0 787.02            Plan:        For HA Prevention:  1 no topamax d/t PMHx of nephrolithiasis, no BB d/t PMHx of asthma   2refilled elavil at 25 mg Q2h before bed        For HA :  Ubrelvy   Zofran refilled     To break up Headaches:  If HA return, can consider another course of deltasone       Other:  Gave her some basic tenants for home remedy for dysautonomia (in AVS, at her request to receive this info...), follow up with cariology           All test results as well as any necessary instructions were reviewed and discussed with patient.    Review:  Orders Placed This Encounter    amitriptyline (ELAVIL) 25 MG tablet    ondansetron (ZOFRAN-ODT) 4 MG TbDL             Patient to return to PCP/other specialists for all other problems  Patient to continue on all medications as Rx'd  Full office note available online   RTO-6 months to check in   The patient indicates understanding of these issues and agrees to the plan.    HPI:   Valerie Cole is a 28 y.o.right handed, female with a PMHx of: asthma, GERD, and nephrolithiasis   whom presents solo in follow up for HA.    At last visit:elavil at 25 mg and ubrelvy.   2-3 migraine days a month, dehydration can be a trigger and maybe a small HA (1-2 days) out of a month   Intensity come down with HA   Ubrelvy  "is abortive, no side effects  No side effects from elavil but feels a bit more sluggish in morning, and at times feels mental acuity is decreased  Pain is retrorbital (OD>OS), or can be along frontalis   Last HA: 1 week ago   She would like a refill of zofran for her nausea that she gets with migraines   [facility]ions as aside liked new rheumatologist  Has seen cardiology, awaiting results of recent visit (having tachycardia), she is concerned she has POTS       -states will have light headedness after standing for a while, and notes to have palpitations  "Just standing there."She asks to discuss POTS more.         At last visit: elavil and prednisone written, has nurtec from PCP.   8 HD in past month, pain stays about 5/10 on average ( noted 2 may have been a bit higher)   Last mild HA right now, feels may be d/t sinuses   Pain retro orbital (R>L and then moves to ipsilateral forehead)  Elavil at 20 mg no side effects   Steroids had delayed benefit, no side effects   Mentions multi location joint pain, hyper mobility, hx of ulcers, GI issues, and numbness that migrates. Has seen rheum, been referred to PT, finds it not helping and would like second opinion.     HA Historically:   Start:HA in past (2 years ago) but were episodic (1 every few months), but became more prevalent in past 8-9 months  and in past 1-2 months more regularly, no triggering nidus per patient/fiance.   History of trauma (no), History of CNS infection (no), History of Stroke (no)  Location:right retroobital  and right temporal, however rarely on the left   Severity: range: 1-6/10  Duration:4+ hours   Frequency:15 HD in past month  Aura: vision change/blurred vision (infrequent)   Associated factors (bolded positive) WITH HA ( or migraine): Nausea, vomiting, photophobia, numbness in facie (rare), phonophobia, tinnitus, scalp pain, vision loss, diplopia, scintillations, eye pain, jaw pain, clumsiness (dropping things-?),  weakness?    Tried:ibuprofen "   Triggers (in bold): stress, lack of sleep, too much caffeine, too little caffeine, weather change, seasonal change, strong odours, bright lights, sunlight, food , not a trigger but patient states she does miss meals frequently   Currently having a HA?:yes   Positives in bold: Hx of Kidney Stones, asthma, GI bleed, osteoporosis, CAD/MI, CVA/TIA, DM    Imaging on file: MRI Brain (non contrasted) 11/9/2023-NML  Therapies tried in past: (failures to be marked, if known---why did it fail?)  Zofran  Toradol  Lexapro  Imitrex  Nurtec   Elavil  Prednisone  Ubrelvy       Medication Reconciliation:   Current Outpatient Medications   Medication Sig Dispense Refill    albuterol (VENTOLIN HFA) 90 mcg/actuation inhaler Inhale 2 puffs into the lungs every 6 (six) hours as needed for Wheezing. Rescue 18 g 0    albuterol-budesonide (AIRSUPRA) 90-80 mcg/actuation Inhale 2 puffs into the lungs every 4 (four) hours as needed (asthma). 10.7 g 11    azelastine (ASTELIN) 137 mcg (0.1 %) nasal spray 1 spray (137 mcg total) by Nasal route 2 (two) times daily. 30 mL 0    azithromycin (ZITHROMAX) 500 MG tablet One daily for yellow mucous, repeat if needed 3 tablet 3    ergocalciferol, vitamin D2, (VITAMIN D ORAL) Take by mouth.      fluticasone propionate (FLONASE) 50 mcg/actuation nasal spray 1 spray (50 mcg total) by Each Nostril route once daily. 15.8 mL 0    fluticasone-umeclidin-vilanter (TRELEGY ELLIPTA) 200-62.5-25 mcg inhaler Inhale 1 puff into the lungs once daily. 60 each 11    Lactobac no.41/Bifidobact no.7 (PROBIOTIC-10 ORAL) Take by mouth.      montelukast (SINGULAIR) 10 mg tablet Take 1 tablet (10 mg total) by mouth every evening. 30 tablet 11    multivitamin capsule Take 1 capsule by mouth once daily.      mupirocin (BACTROBAN) 2 % ointment Apply to piercing sites BID 22 g 1    OMEPRAZOLE ORAL Take by mouth.      ondansetron (ZOFRAN-ODT) 4 MG TbDL Take 1 tablet (4 mg total) by mouth every 8 (eight) hours as needed (nausea).  20 tablet 0    promethazine-dextromethorphan (PROMETHAZINE-DM) 6.25-15 mg/5 mL Syrp Take 5 mLs by mouth nightly as needed (cough). 118 mL 0    tacrolimus (PROTOPIC) 0.1 % ointment Apply to affected areas of face BID prn eczema/rash. 100 g 2    ubrogepant (UBRELVY) 100 mg tablet Take 1 tablet by mouth at onset headache, second tablet 2 hours later if needed, no more than 2 tablets per day day/3 days use in week 10 tablet 6    amitriptyline (ELAVIL) 25 MG tablet Take1 pill approx 2 hours before bed 30 tablet 6    cetirizine (ZYRTEC) 10 MG tablet Take 1 tablet (10 mg total) by mouth once daily. 30 tablet 0     No current facility-administered medications for this visit.     Review of patient's allergies indicates:  No Known Allergies    PMHx:  Past Medical History:   Diagnosis Date    Asthma     GERD (gastroesophageal reflux disease)     Headache     Kidney stones      No past surgical history on file.    Fhx:  Family History   Problem Relation Name Age of Onset    Arthritis Mother      Hypertension Mother      Hypertension Father      Hypertension Maternal Aunt      Hypertension Maternal Uncle         Shx:   Social History     Socioeconomic History    Marital status:    Tobacco Use    Smoking status: Never    Smokeless tobacco: Never   Substance and Sexual Activity    Alcohol use: No    Drug use: Never     Social Determinants of Health     Financial Resource Strain: High Risk (11/9/2023)    Overall Financial Resource Strain (CARDIA)     Difficulty of Paying Living Expenses: Hard   Food Insecurity: Food Insecurity Present (11/9/2023)    Hunger Vital Sign     Worried About Running Out of Food in the Last Year: Sometimes true     Ran Out of Food in the Last Year: Never true   Transportation Needs: No Transportation Needs (11/9/2023)    PRAPARE - Transportation     Lack of Transportation (Medical): No     Lack of Transportation (Non-Medical): No   Physical Activity: Sufficiently Active (11/9/2023)    Exercise Vital  Sign     Days of Exercise per Week: 2 days     Minutes of Exercise per Session: 90 min   Stress: No Stress Concern Present (11/9/2023)    Citizen of [place] of Occupational Health - Occupational Stress Questionnaire     Feeling of Stress : Not at all   Housing Stability: High Risk (11/9/2023)    Housing Stability Vital Sign     Unable to Pay for Housing in the Last Year: Yes     Number of Places Lived in the Last Year: 1     Unstable Housing in the Last Year: No           Labs:   CMP  Sodium   Date Value Ref Range Status   11/09/2023 140 136 - 145 mmol/L Final     Potassium   Date Value Ref Range Status   11/09/2023 3.9 3.5 - 5.1 mmol/L Final     Chloride   Date Value Ref Range Status   11/09/2023 106 95 - 110 mmol/L Final     CO2   Date Value Ref Range Status   11/09/2023 25 23 - 29 mmol/L Final     Glucose   Date Value Ref Range Status   11/09/2023 96 70 - 110 mg/dL Final     BUN   Date Value Ref Range Status   11/09/2023 8 6 - 20 mg/dL Final     Creatinine   Date Value Ref Range Status   11/09/2023 0.9 0.5 - 1.4 mg/dL Final     Calcium   Date Value Ref Range Status   11/09/2023 9.3 8.7 - 10.5 mg/dL Final     Total Protein   Date Value Ref Range Status   11/09/2023 7.0 6.0 - 8.4 g/dL Final     Albumin   Date Value Ref Range Status   11/09/2023 4.3 3.5 - 5.2 g/dL Final     Total Bilirubin   Date Value Ref Range Status   11/09/2023 0.8 0.1 - 1.0 mg/dL Final     Comment:     For infants and newborns, interpretation of results should be based  on gestational age, weight and in agreement with clinical  observations.    Premature Infant recommended reference ranges:  Up to 24 hours.............<8.0 mg/dL  Up to 48 hours............<12.0 mg/dL  3-5 days..................<15.0 mg/dL  6-29 days.................<15.0 mg/dL       Alkaline Phosphatase   Date Value Ref Range Status   11/09/2023 58 55 - 135 U/L Final     AST   Date Value Ref Range Status   11/09/2023 26 10 - 40 U/L Final     ALT   Date Value Ref Range Status    2023 29 10 - 44 U/L Final     Anion Gap   Date Value Ref Range Status   2023 9 8 - 16 mmol/L Final     eGFR   Date Value Ref Range Status   2023 >60 >60 mL/min/1.73 m^2 Final     Lab Results   Component Value Date    WBC 4.05 2023    HGB 12.9 2023    HCT 37.5 2023    MCV 92 2023     2023       Lab Results   Component Value Date    TSH 1.239 2023         Imagin2023 MRI Brain (report): Clinical history is headache for 7 days, blurry vision right eye     Multiplanar images of the brain were obtained.     The ventricles and sulci are normal in size and configuration for age. There is no hemorrhage, mass or midline shift. There are no extra-axial fluid collections.  There is normal signal within the white matter on all sequences. There is no abnormality on the diffusion-weighted images to suggest acute infarct. Cerebellum and brainstem are normal.  There are flow voids in the cavernous portions of the internal carotid arteries and basilar artery indicating patency. The corpus callosum, cerebellar tonsils, midline structures and orbits are normal.     IMPRESSION: Negative MRI of the brain      Other testing:  Reviewed in chart     Note: I have independently reviewed any/all imaging/labs/tests and agree with the report (s) as documented.  Any discrepancies will be as noted/demarcated by free text.  JOE ZAVALA 2024                     ROS:   Review Of Systems (questions asked, positive or additions in BOLD)  Gen: Weight change, fatigue/malaise, pyrexia   HEENT: Tinnitus, headache,  blurred vision, eye pain, diplopia, photophobia,  nose bleeds, congestion, sore throat, jaw pain, scalp pain, neck stiffness   Card: Palpitations, CP   Pulm: SOB, cough   Vas: Easy bruising, easy bleeding   GI: N/V/D/C, incontinence, hematemesis, hematochezia    : incontinence, hematuria   Endocrine: Temp intolerance, polyuria, polydipsia   M/S: Neck pain, myalgia, back  "pain, joint pain, falls    Neuro: PER HPI   PSY: Memory loss, confusion, depression, anxiety, trouble in sleep, hallucinations          EXAM:   Remains consistent   /78 (BP Location: Right arm, Patient Position: Sitting, BP Method: Medium (Automatic))   Pulse 90   Temp 97.8 °F (36.6 °C)   Resp 17   Ht 5' 10" (1.778 m)   Wt 71.8 kg (158 lb 4.6 oz)   LMP 05/07/2024 (Approximate)   BMI 22.71 kg/m²    GEN:  NAD  HEENT: NC/AT   EXTREM:    no edema present.    NEURO:  Mental Status:  Awake, alert and appropriately oriented to time, place, and person.  Normal attention and concentration.  Speech is fluent and appropriate language function (I.e., comprehension).     Cranial Nerves:      Extraocular movements are intact and without nystagmus.  Visual fields are full to confrontation testing.    Facial movement is symmetric.  Hearing is normal. Uvula in midline. FROM of neck in all (6) directions, shoulder shrug symmetrical. Tongue in midline without fasiculation.     Motor:  antigravity in all limbs  Tremor/pronator drift not apparent.     DTR: 1+ bilat patellar       Gait and Stance: Normal manner of stance and gait function testing.           This document has been electronically signed by Mr. Haider Dumont MPA, PA-C on 5/21/2024, I have personally typed this message using the EMR.       Dr Jesus MD was available during today's visit.     Personal Protective Equipment:    Personal Protective Equipment was used during this encounter including: non latex gloves.          CC: Christoph Watson MD             "

## 2024-05-21 NOTE — PATIENT INSTRUCTIONS
"If concerns for dysautonomia:   1) raise head of the bed 10-15 degrees  2) drink water upon awakening  3) have a "salty breakfast"   4) go from sit to stand SLOWLY   5) stay well hydrated  6) consider thigh high compression stockings for slumber   "

## 2024-05-23 ENCOUNTER — CLINICAL SUPPORT (OUTPATIENT)
Dept: REHABILITATION | Facility: HOSPITAL | Age: 29
End: 2024-05-23
Payer: COMMERCIAL

## 2024-05-23 DIAGNOSIS — R29.898 WEAKNESS OF RIGHT UPPER EXTREMITY: ICD-10-CM

## 2024-05-23 DIAGNOSIS — R29.3 POSTURE ABNORMALITY: Primary | ICD-10-CM

## 2024-05-23 DIAGNOSIS — M89.9 SCAPULAR DYSFUNCTION: ICD-10-CM

## 2024-05-23 PROCEDURE — 97110 THERAPEUTIC EXERCISES: CPT

## 2024-05-23 PROCEDURE — 97140 MANUAL THERAPY 1/> REGIONS: CPT

## 2024-05-23 PROCEDURE — 97112 NEUROMUSCULAR REEDUCATION: CPT

## 2024-05-23 NOTE — PROGRESS NOTES
Physical Therapy Daily Treatment Note / Updated Plan of Care Note     Name: Valerie Cole  Clinic Number: [id number]    Therapy Diagnosis:   Encounter Diagnoses   Name Primary?    Posture abnormality Yes    Weakness of right upper extremity     Scapular dysfunction      Physician: Milagro Alexander*    Visit Date: 5/23/2024    Physician Orders: PT Eval and Treat  Medical Diagnosis from Referral: M25.50 (ICD-10-CM) - Arthralgia, unspecified joint  Evaluation Date: 11/10/2023  Authorization Period Expiration: 12/31/2024  Plan of Care Expiration: 7/4/2024  Progress Note Due: 6/24/2024  Visit # / Visits authorized: 20/32  FOTO: 1/ 3    1st FOTO Follow up: 12/28/2023   2nd FOTO Follow up:     Time In: 305pm  Time Out: 400pm  Total Billable Time: 55 minutes    Precautions: Standard    Subjective     Pt reports: that she is doing much better than last week after her flare up. She is still having some problems with the R shoulder and hip as well.   She was compliant with home exercise program.  Response to previous treatment: no change  Functional change: ongoing    Pain: 2/10  Location: right shoulder (deep inside)      Objective     Passive Range of Motion (degrees):   Shoulder Right Left   Flexion 180 180   Abduction 180 180   ER at 0 80  70   ER at 45 90  80   ER at 90 120 (pain at end range) 110      Active Range of Motion (degrees):   Shoulder Right Left   Flexion 180 180   Abduction 180 180   ER at 0 80 70         Upper Extremity Strength  (R) UE   (L) UE     Shoulder ER 4/5 Shoulder ER 4/5   Shoulder IR 4/5 Shoulder IR 4/5   Lower Trap 3+/5 Lower Trap 3+/5   Middle Trap 3+/5 Middle Trap 3+/5        Valerie received therapeutic exercises to develop strength, endurance, and ROM for 15 minutes including:    Assessment as above  SL rib rolls, 2 x 10 on each side  Seated thoracic extension over a chair (attempted but unable to perform)  Hand heel rocks, 3 x 8    Valerie received the following manual therapy  "techniques: Joint mobilizations were applied to the: shoulder and thoracic spine for 15 minutes, including:    Prone thoracic spine PA mobs, grade IV and IV  Side lying R lumbar gapping manipulation, grade V  R hip long axis distraction, grade IV and V  R hip lateral glides, grade III    Valerie participated in neuromuscular re-education activities to improve: Coordination, Kinesthetic, Sense, and Proprioception for 25 minutes. The following activities were included:    ER and IR walkouts with GTB with arm at 0, 2 x 10 with 3" holds   Rhythmic stabilization from PT, 3 x 30" with arm at 90 and 120 degrees flexion  Serratus wall slides, 3 x 8  Chin tucks with biofeedback cuff, 15x with 10" holds  Chin tucks with biofeedback cuff + shoulder flexion to 90 with 2#, 15x on each side  Bridges, 3 x 8  SL clamshells with RTB, 2 x 10 on each side     Not performed today:  Prone low trap level 3 on stability ball, 2 x 10  Prone mid trap level 3 on stability ball, 2 x 10  Self rhythmic stabilization with RTB, arm at 0 and 90, 3 x 30" each  Side lying ER with 4#, 3 x 8 with 5" holds  Planks on elbows on stability ball, 4 x 20" holds  Robots on the wall with YTB around wrists, 2 x 8    Valerie participated in dynamic functional therapeutic activities to improve functional performance for 00 minutes, including:    Not today:  Pushup position on ground + alternating shoulder taps, 4 x 8  Overhead carries with 7#, 20 yards x5    Home Exercises Provided and Patient Education Provided     Education provided:   - HEP  - Plan of care  - Excessive mobility in multiple joints  - importance of improving strength and stability around the shoulder    Written Home Exercises Provided: Patient instructed to cont prior HEP.  Exercises were reviewed and Valerie was able to demonstrate them prior to the end of the session.  Valerie demonstrated good  understanding of the education provided.     See EMR under Patient Instructions for exercises " provided  11/10/2023 .    Assessment     Valerie has been treated for a total of 27 visits over the past 27 weeks to address her complaints of R shoulder pain. Recently, Valerie experienced a flared up in her R shoulder symptoms and has also been dealing with R sided low back and hip pain as well. Her shoulder ROM was pain free in all directions today but she does remain with pain passively at her end range of ER at 90/90. She also remains with rotator cuff and periscapular muscle weakness. Today, her other areas of pain were addressed manually and with motor control exercises with good tolerance. At this time Valerie remains a great candidate for skilled PT services.       Valerie Is progressing well towards her goals.   Pt prognosis is Good.     Pt will continue to benefit from skilled outpatient physical therapy to address the deficits listed in the problem list box on initial evaluation, provide pt/family education and to maximize pt's level of independence in the home and community environment.     Pt's spiritual, cultural and educational needs considered and pt agreeable to plan of care and goals.     Anticipated barriers to physical therapy: chronicity of symptoms, high Beighton index     Goals:   Short Term Goals (6 Weeks):   1. Pt will be independent with HEP to supplement PT in improving functional use of R UE. (MET)  2. Pt will display full and pain free shoulder flexion and abduction ROM to demonstrate improved GH joint mechanics. (Progressing)  3. Pt will increase R UE strength by 1/2 MMT grade to demonstrate improved functional strength. (MET)  Long Term Goals (12 Weeks):   1. Pt will improve FOTO score to </=75% to decrease perceived limitation with carrying, moving, and handling objects.(Progressing)  2. Pt will be able to lift 15# overhead without pain to demonstrate improved overhead strength. (Progressing)  3. Pt will increase R UE strength by 1 MMT grade to demonstrate improved functional  strength. (Progressing)  4. Pt will be able to perform all dance activities without pain to demonstrate improved overall QOL. (Progressing)  5. Pt will be independent with gym routine to demonstrate improved ability to maintain strength outside of the clinic. (Progressing)    Plan     Plan of care Certification: 5/23/2024 to 7/4/2024.     Outpatient Physical Therapy 1 times weekly for 6 weeks to include the following interventions: Gait Training, Manual Therapy, Moist Heat/ Ice, Neuromuscular Re-ed, Patient Education, Self Care, Therapeutic Activities, and Therapeutic Exercise.     GEOVANNA ZARAGOZA, PT   Board Certified in Orthopedic Physical Therapy

## 2024-05-24 ENCOUNTER — CLINICAL SUPPORT (OUTPATIENT)
Dept: REHABILITATION | Facility: HOSPITAL | Age: 29
End: 2024-05-24
Payer: COMMERCIAL

## 2024-05-24 DIAGNOSIS — M25.539 MOVEMENTS OF WRIST WORSEN PAIN: Primary | ICD-10-CM

## 2024-05-24 DIAGNOSIS — R29.898 REDUCED HAND STRENGTH: ICD-10-CM

## 2024-05-24 PROCEDURE — 97110 THERAPEUTIC EXERCISES: CPT

## 2024-05-24 PROCEDURE — 97140 MANUAL THERAPY 1/> REGIONS: CPT

## 2024-05-24 PROCEDURE — 97530 THERAPEUTIC ACTIVITIES: CPT

## 2024-05-24 PROCEDURE — 97022 WHIRLPOOL THERAPY: CPT

## 2024-05-24 NOTE — PROGRESS NOTES
[facility]  Occupational Therapy Treatment Note    Date: 5/24/2024  Name: Valerie Cole  Clinic Number: [id number]    Therapy Diagnosis:   Encounter Diagnoses   Name Primary?    Movements of wrist worsen pain Yes    Reduced hand strength      Physician: Milagro Alexander*    Physician Orders: OT Eval and Treat; Please begin therapy to the left wrist. Include strengthening and stabilization. Please transition to a long-term home strengthening and stabilization program at the end of the therapy visits. 2 times per week, 4 weeks   Medical Diagnosis: M24.232 (ICD-10-CM) - Extensor tendon ligament laxity of wrist, left (Left wrist hyperlaxity with pain)  Surgical Procedure and Date: N/A / Date of Injury/Onset: progressive onset, over a year  Evaluation Date: 5/15/2024  Insurance Authorization Period Expiration: 12/31/2024  Plan of Care Expiration: 8/9/24 (12 weeks)   Date of Return to MD: 6/5/2024  Visit # / Visits authorized: 2 / 21  FOTO: 1/3    Precautions: Standard    Time In: 10:35 am  Time Out: 11:22 am  Total Billable Time: 47 minutes    SUBJECTIVE     Pt reports: no change in symptoms.   She was compliant with home exercise program given last session.   Response to previous treatment: First treatment  Functional change: none noted yet    Pain: 0/10  Location: left wrist    OBJECTIVE   Objective Measures updated at progress report unless specified.    Observation/Appearance: L volar wrist TTP.      Edema. None noted.      Elbow and Wrist ROM. WFLs - all planes of motion. L wrist painful with extension and RD.      Hand ROM. WFLs - Pt able to make full composite fists with B/L hands and demonstrates thumb opposition touch to SF DPC.       Strength (Dynamometer) and Pinch Strength (Pinch Gauge)  Measured in pounds to POP.    5/15/2024 5/15/2024     Left Right   Rung II 38 73   Jolly Pinch 11.5 13      Sensation. Pt endorses intermittent tingling in B/L ulnar fingers. Denies  numbness.      Manual Muscle Test    5/15/2024     Left   Wrist Extension  5/5   Wrist Flexion 5/5   Radial Deviation 5/5   Ulnar Deviation 5/5   Supination 5/5   Pronation 5/5     Treatment     Valerie received the treatments listed below:     Supervised modalities after being cleared for contradictions: Fluidotherapy - 115 degrees and 50 speed, to L hand/wrist for 15 minutes to increase blood flow and circulation, desensitization and sensory re-education, pain management, and increased tissue extensibility prior to therex. Pt instructed on performing active range of motion exercises while receiving heat to increase active motion.     Manual therapy techniques: Soft tissue Mobilization and Friction Massage were applied to the: L hand/wrist for 10 minutes, including:  - Performed soft tissue mobilization/massage to relieve associated soft tissue tightness, improve joint mobility, decrease pain, and improve lymphatic drainage to increase ROM.   - Performed cross friction massage to L wrist to decrease edema, improve joint mobility, decrease pain, and promote proper healing via increased circulation.     Therapeutic exercises to develop strength, endurance, ROM, and flexibility for 28 minutes, including:  - AROM in fluidotherapy x 15 min  - Wrist isometrics 4 ways x 10 reps each  - Dart-thrower's motion, pain-free range x 15 reps  - Theraputty HEP, yellow: molding, gripping, EDC ext @ home    Therapeutic activities to improve functional performance for 9 minutes, including:  - Manny balance x 3 min  - Wrist dextericiser x 2 min  - Isospheres x 2 min   - Flexbar oscillations x 2 min    Patient Education and Home Exercises      Education provided:   - HEP in place  - Progress towards goals     Written Home Exercises Provided: Patient instructed to cont prior HEP.  Exercises were reviewed and Valerie was able to demonstrate them prior to the end of the session.  Valerie demonstrated good  understanding of the HEP  provided. See EMR under Patient Instructions for exercises provided during therapy sessions.      ASSESSMENT     Pt returns for her first follow-up visit this date. She participated well and endorses good adherence to HEP. Reports no change in symptoms at this time. Remains mostly limited by wrist pain/weakness. Will progress as tolerated.      Valerie is progressing well towards her goals and there are no updates to goals at this time. Pt prognosis is Good.     Pt will continue to benefit from skilled outpatient occupational therapy to address the deficits listed in the problem list on initial evaluation, provide pt/family education and to maximize pt's level of independence in the home and community environment.     Pt's spiritual, cultural and educational needs considered and pt agreeable to plan of care and goals.    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   Short Term Goals (STGs) = Long Term Goals (LTGs); to be met by discharge.  LTG #1: Pt will report a pain level of 4-5 out of 10 at worst with daily hand use. progressing not met 5/24/2024  LTG #2: Pt will demo improved FOTO score by 5-10 points. progressing not met 5/24/2024  LTG #3: Pt will increase left hand  strength by at least 5-10 lbs needed to open jars and carry groceries. progressing not met 5/24/2024  LTG #4: Pt will demonstrate independence with issued HEP, brace wear as needed, and modalities for pain/symptom management. progressing not met 5/24/2024    PLAN     Continue skilled occupational therapy with individualized plan of care focusing on maximizing functional use of patient's left hand.    Updates/Grading for next session: progress as tolerated.    Aruna Gonzales, OTR/L

## 2024-05-27 ENCOUNTER — PATIENT MESSAGE (OUTPATIENT)
Dept: CARDIOLOGY | Facility: CLINIC | Age: 29
End: 2024-05-27
Payer: COMMERCIAL

## 2024-05-27 NOTE — PROGRESS NOTES
See comments below and call patient to discuss.   Please close encounter when done -- no need to route back to me.  Thanks  The holter is rather unremarkable   Her urinary K is on the low side and her daily salt intake is @ 6 gm.  Plan: continue with holistic measures

## 2024-05-27 NOTE — PLAN OF CARE
Physical Therapy Daily Treatment Note / Updated Plan of Care Note     Name: Valerie Cole  Clinic Number: [id number]    Therapy Diagnosis:   Encounter Diagnoses   Name Primary?    Posture abnormality Yes    Weakness of right upper extremity     Scapular dysfunction      Physician: Milagro Alexander*    Visit Date: 5/23/2024    Physician Orders: PT Eval and Treat  Medical Diagnosis from Referral: M25.50 (ICD-10-CM) - Arthralgia, unspecified joint  Evaluation Date: 11/10/2023  Authorization Period Expiration: 12/31/2024  Plan of Care Expiration: 7/4/2024  Progress Note Due: 6/24/2024  Visit # / Visits authorized: 20/32  FOTO: 1/ 3    1st FOTO Follow up: 12/28/2023   2nd FOTO Follow up:     Time In: 305pm  Time Out: 400pm  Total Billable Time: 55 minutes    Precautions: Standard    Subjective     Pt reports: that she is doing much better than last week after her flare up. She is still having some problems with the R shoulder and hip as well.   She was compliant with home exercise program.  Response to previous treatment: no change  Functional change: ongoing    Pain: 2/10  Location: right shoulder (deep inside)      Objective     Passive Range of Motion (degrees):   Shoulder Right Left   Flexion 180 180   Abduction 180 180   ER at 0 80  70   ER at 45 90  80   ER at 90 120 (pain at end range) 110      Active Range of Motion (degrees):   Shoulder Right Left   Flexion 180 180   Abduction 180 180   ER at 0 80 70         Upper Extremity Strength  (R) UE   (L) UE     Shoulder ER 4/5 Shoulder ER 4/5   Shoulder IR 4/5 Shoulder IR 4/5   Lower Trap 3+/5 Lower Trap 3+/5   Middle Trap 3+/5 Middle Trap 3+/5        Valerie received therapeutic exercises to develop strength, endurance, and ROM for 15 minutes including:    Assessment as above  SL rib rolls, 2 x 10 on each side  Seated thoracic extension over a chair (attempted but unable to perform)  Hand heel rocks, 3 x 8    Valerie received the following manual therapy  "techniques: Joint mobilizations were applied to the: shoulder and thoracic spine for 15 minutes, including:    Prone thoracic spine PA mobs, grade IV and IV  Side lying R lumbar gapping manipulation, grade V  R hip long axis distraction, grade IV and V  R hip lateral glides, grade III    Valerie participated in neuromuscular re-education activities to improve: Coordination, Kinesthetic, Sense, and Proprioception for 25 minutes. The following activities were included:    ER and IR walkouts with GTB with arm at 0, 2 x 10 with 3" holds   Rhythmic stabilization from PT, 3 x 30" with arm at 90 and 120 degrees flexion  Serratus wall slides, 3 x 8  Chin tucks with biofeedback cuff, 15x with 10" holds  Chin tucks with biofeedback cuff + shoulder flexion to 90 with 2#, 15x on each side  Bridges, 3 x 8  SL clamshells with RTB, 2 x 10 on each side     Not performed today:  Prone low trap level 3 on stability ball, 2 x 10  Prone mid trap level 3 on stability ball, 2 x 10  Self rhythmic stabilization with RTB, arm at 0 and 90, 3 x 30" each  Side lying ER with 4#, 3 x 8 with 5" holds  Planks on elbows on stability ball, 4 x 20" holds  Robots on the wall with YTB around wrists, 2 x 8    Valerie participated in dynamic functional therapeutic activities to improve functional performance for 00 minutes, including:    Not today:  Pushup position on ground + alternating shoulder taps, 4 x 8  Overhead carries with 7#, 20 yards x5    Home Exercises Provided and Patient Education Provided     Education provided:   - HEP  - Plan of care  - Excessive mobility in multiple joints  - importance of improving strength and stability around the shoulder    Written Home Exercises Provided: Patient instructed to cont prior HEP.  Exercises were reviewed and Valerie was able to demonstrate them prior to the end of the session.  Valerie demonstrated good  understanding of the education provided.     See EMR under Patient Instructions for exercises " provided 11/10/2023.    Assessment     Valerie has been treated for a total of 27 visits over the past 27 weeks to address her complaints of R shoulder pain. Recently, Valerie experienced a flared up in her R shoulder symptoms and has also been dealing with R sided low back and hip pain as well. Her shoulder ROM was pain free in all directions today but she does remain with pain passively at her end range of ER at 90/90. She also remains with rotator cuff and periscapular muscle weakness. Today, her other areas of pain were addressed manually and with motor control exercises with good tolerance. At this time Valerie remains a great candidate for skilled PT services.       Valerie Is progressing well towards her goals.   Pt prognosis is Good.     Pt will continue to benefit from skilled outpatient physical therapy to address the deficits listed in the problem list box on initial evaluation, provide pt/family education and to maximize pt's level of independence in the home and community environment.     Pt's spiritual, cultural and educational needs considered and pt agreeable to plan of care and goals.     Anticipated barriers to physical therapy: chronicity of symptoms, high Beighton index     Goals:   Short Term Goals (6 Weeks):   1. Pt will be independent with HEP to supplement PT in improving functional use of R UE. (MET)  2. Pt will display full and pain free shoulder flexion and abduction ROM to demonstrate improved GH joint mechanics. (Progressing)  3. Pt will increase R UE strength by 1/2 MMT grade to demonstrate improved functional strength. (MET)  Long Term Goals (12 Weeks):   1. Pt will improve FOTO score to </=75% to decrease perceived limitation with carrying, moving, and handling objects.(Progressing)  2. Pt will be able to lift 15# overhead without pain to demonstrate improved overhead strength. (Progressing)  3. Pt will increase R UE strength by 1 MMT grade to demonstrate improved functional  strength. (Progressing)  4. Pt will be able to perform all dance activities without pain to demonstrate improved overall QOL. (Progressing)  5. Pt will be independent with gym routine to demonstrate improved ability to maintain strength outside of the clinic. (Progressing)    Plan     Plan of care Certification: 5/23/2024 to 7/4/2024.     Outpatient Physical Therapy 1 times weekly for 6 weeks to include the following interventions: Gait Training, Manual Therapy, Moist Heat/ Ice, Neuromuscular Re-ed, Patient Education, Self Care, Therapeutic Activities, and Therapeutic Exercise.     GEOVANNA ZARAGOZA, PT   Board Certified in Orthopedic Physical Therapy

## 2024-05-31 ENCOUNTER — CLINICAL SUPPORT (OUTPATIENT)
Dept: REHABILITATION | Facility: HOSPITAL | Age: 29
End: 2024-05-31
Payer: COMMERCIAL

## 2024-05-31 ENCOUNTER — PATIENT MESSAGE (OUTPATIENT)
Dept: REHABILITATION | Facility: HOSPITAL | Age: 29
End: 2024-05-31

## 2024-05-31 DIAGNOSIS — R29.898 WEAKNESS OF RIGHT UPPER EXTREMITY: ICD-10-CM

## 2024-05-31 DIAGNOSIS — R29.3 POSTURE ABNORMALITY: Primary | ICD-10-CM

## 2024-05-31 DIAGNOSIS — M89.9 SCAPULAR DYSFUNCTION: ICD-10-CM

## 2024-05-31 PROCEDURE — 97140 MANUAL THERAPY 1/> REGIONS: CPT

## 2024-05-31 PROCEDURE — 97112 NEUROMUSCULAR REEDUCATION: CPT

## 2024-05-31 PROCEDURE — 97110 THERAPEUTIC EXERCISES: CPT

## 2024-05-31 NOTE — PROGRESS NOTES
Physical Therapy Daily Treatment Note     Name: Valerie Cole  Clinic Number: [id number]    Therapy Diagnosis:   Encounter Diagnoses   Name Primary?    Posture abnormality Yes    Weakness of right upper extremity     Scapular dysfunction        Physician: Milagro Alexander*    Visit Date: 5/31/2024    Physician Orders: PT Eval and Treat  Medical Diagnosis from Referral: M25.50 (ICD-10-CM) - Arthralgia, unspecified joint  Evaluation Date: 11/10/2023  Authorization Period Expiration: 12/31/2024  Plan of Care Expiration: 7/4/2024  Progress Note Due: 6/24/2024  Visit # / Visits authorized: 21/32  FOTO: 1/ 3    1st FOTO Follow up: 12/28/2023   2nd FOTO Follow up:     Time In: 920am (late arrival due to traffic)  Time Out: 1000am  Total Billable Time: 40 minutes    Precautions: Standard    Subjective     Pt reports: the back and R hip are both better than last week. The low back pain moves from side to side. Her L foot is still really bothering her. The pain is on the outside (points to 5th metatarsal) and hurts during dancing and when standing/walking for too long.   She was compliant with home exercise program.  Response to previous treatment: no change  Functional change: ongoing    Pain: 2/10  Location: right shoulder (deep inside)      Objective     Valerie received therapeutic exercises to develop strength, endurance, and ROM for 15 minutes including:    SL rib rolls, 2 x 10 on each side  Seated thoracic extension over a chair, 3 x 10  Hand heel rocks, 3 x 8  Kneeling lunges for ankle DF, 3 x 10 on the L    Valerie received the following manual therapy techniques: Joint mobilizations were applied to the: shoulder and thoracic spine for 10 minutes, including:    Prone thoracic spine PA mobs, grade IV and IV  L talocrural distraction, grade V  R hip long axis distraction, grade IV and V  R hip lateral glides, grade III    Valerie participated in neuromuscular re-education activities to improve:  "Coordination, Kinesthetic, Sense, and Proprioception for 15 minutes. The following activities were included:    ER and IR walkouts with GTB with arm at 0, 2 x 10 with 3" holds   Rhythmic stabilization from PT, 3 x 30" with arm at 90 and 120 degrees flexion  Serratus wall slides, 3 x 8  Bridges, 3 x 8  SL clamshells with RTB, 2 x 10 on each side     Not performed today:  Prone low trap level 3 on stability ball, 2 x 10  Prone mid trap level 3 on stability ball, 2 x 10  Self rhythmic stabilization with RTB, arm at 0 and 90, 3 x 30" each  Side lying ER with 4#, 3 x 8 with 5" holds  Planks on elbows on stability ball, 4 x 20" holds  Robots on the wall with YTB around wrists, 2 x 8    Valerie participated in dynamic functional therapeutic activities to improve functional performance for 00 minutes, including:    Not today:  Pushup position on ground + alternating shoulder taps, 4 x 8  Overhead carries with 7#, 20 yards x5    Home Exercises Provided and Patient Education Provided     Education provided:   - HEP  - Plan of care  - Excessive mobility in multiple joints  - importance of improving strength and stability around the shoulder    Written Home Exercises Provided: Patient instructed to cont prior HEP.  Exercises were reviewed and Valerie was able to demonstrate them prior to the end of the session.  Valerie demonstrated good  understanding of the education provided.     See EMR under Patient Instructions for exercises provided  11/10/2023 .    Assessment     Valerie presents today with improving reports of low back and R hip pain at this time. She is having continued L lateral foot pain at the 5th metatarsal. There is some bony tenderness present at this time. She was educated to avoid over loading herself at dance practices to allow this area to heal. Her shoulder was challenged with strengthening and control exercises today with good tolerance.     Valerie Is progressing well towards her goals.   Pt " prognosis is Good.     Pt will continue to benefit from skilled outpatient physical therapy to address the deficits listed in the problem list box on initial evaluation, provide pt/family education and to maximize pt's level of independence in the home and community environment.     Pt's spiritual, cultural and educational needs considered and pt agreeable to plan of care and goals.     Anticipated barriers to physical therapy: chronicity of symptoms, high Beighton index     Goals:   Short Term Goals (6 Weeks):   1. Pt will be independent with HEP to supplement PT in improving functional use of R UE. (MET)  2. Pt will display full and pain free shoulder flexion and abduction ROM to demonstrate improved GH joint mechanics. (Progressing)  3. Pt will increase R UE strength by 1/2 MMT grade to demonstrate improved functional strength. (MET)  Long Term Goals (12 Weeks):   1. Pt will improve FOTO score to </=75% to decrease perceived limitation with carrying, moving, and handling objects.(Progressing)  2. Pt will be able to lift 15# overhead without pain to demonstrate improved overhead strength. (Progressing)  3. Pt will increase R UE strength by 1 MMT grade to demonstrate improved functional strength. (Progressing)  4. Pt will be able to perform all dance activities without pain to demonstrate improved overall QOL. (Progressing)  5. Pt will be independent with gym routine to demonstrate improved ability to maintain strength outside of the clinic. (Progressing)    Plan     Plan of care Certification: 5/23/2024 to 7/4/2024.     Outpatient Physical Therapy 1 times weekly for 6 weeks to include the following interventions: Gait Training, Manual Therapy, Moist Heat/ Ice, Neuromuscular Re-ed, Patient Education, Self Care, Therapeutic Activities, and Therapeutic Exercise.     GEOVANNA ZARAGOZA, PT   Board Certified in Orthopedic Physical Therapy

## 2024-06-06 ENCOUNTER — CLINICAL SUPPORT (OUTPATIENT)
Dept: REHABILITATION | Facility: HOSPITAL | Age: 29
End: 2024-06-06
Payer: COMMERCIAL

## 2024-06-06 DIAGNOSIS — R29.898 REDUCED HAND STRENGTH: ICD-10-CM

## 2024-06-06 DIAGNOSIS — M25.539 MOVEMENTS OF WRIST WORSEN PAIN: Primary | ICD-10-CM

## 2024-06-06 PROCEDURE — 97110 THERAPEUTIC EXERCISES: CPT

## 2024-06-06 PROCEDURE — 97530 THERAPEUTIC ACTIVITIES: CPT

## 2024-06-06 PROCEDURE — 97140 MANUAL THERAPY 1/> REGIONS: CPT

## 2024-06-06 PROCEDURE — 97022 WHIRLPOOL THERAPY: CPT

## 2024-06-06 NOTE — PROGRESS NOTES
"[facility]  Occupational Therapy Treatment Note    Date: 6/6/2024  Name: Valerie Cole  Clinic Number: [id number]    Therapy Diagnosis:   Encounter Diagnoses   Name Primary?    Movements of wrist worsen pain Yes    Reduced hand strength      Physician: Pedro Pablo Callejas MD    Physician Orders: OT Eval and Treat; Please begin therapy to the left wrist. Include strengthening and stabilization. Please transition to a long-term home strengthening and stabilization program at the end of the therapy visits. 2 times per week, 4 weeks   Medical Diagnosis: M24.232 (ICD-10-CM) - Extensor tendon ligament laxity of wrist, left (Left wrist hyperlaxity with pain)  Surgical Procedure and Date: N/A / Date of Injury/Onset: progressive onset, over a year  Evaluation Date: 5/15/2024  Insurance Authorization Period Expiration: 12/31/2024  Plan of Care Expiration: 8/9/24 (12 weeks)   Date of Return to MD: date not set   Visit # / Visits authorized: 3 / 21  FOTO: 1/3    Precautions: Standard    Time In: 8:05 am  Time Out: 8:54 am  Total Billable Time: 49 minutes    SUBJECTIVE     Pt reports: "exercises feel better after heat."   She was compliant with home exercise program given last session.   Response to previous treatment: no adverse effects   Functional change: none noted yet    Pain: 0/10  Location: left wrist    OBJECTIVE   Objective Measures updated at progress report unless specified.    Observation/Appearance: L volar wrist TTP.      Edema. None noted.      Elbow and Wrist ROM. WFLs - all planes of motion. L wrist painful with extension and RD.      Hand ROM. WFLs - Pt able to make full composite fists with B/L hands and demonstrates thumb opposition touch to SF DPC.       Strength (Dynamometer) and Pinch Strength (Pinch Gauge)  Measured in pounds to POP.    5/15/2024 5/15/2024 6/6/2024     Left Right Left   Rung II 38 73 42   Jolly Pinch 11.5 13       Sensation. Pt endorses intermittent " tingling in B/L ulnar fingers. Denies numbness.      Manual Muscle Test    5/15/2024     Left   Wrist Extension  5/5   Wrist Flexion 5/5   Radial Deviation 5/5   Ulnar Deviation 5/5   Supination 5/5   Pronation 5/5     Treatment     Valerie received the treatments listed below:     Supervised modalities after being cleared for contradictions: Fluidotherapy - 115 degrees and 50 speed, to L hand/wrist for 15 minutes to increase blood flow and circulation, desensitization and sensory re-education, pain management, and increased tissue extensibility prior to therex. Pt instructed on performing active range of motion exercises while receiving heat to increase active motion.     Manual therapy techniques: Soft tissue Mobilization and Friction Massage were applied to the: L hand/wrist for 10 minutes, including:  - Performed soft tissue mobilization/massage to relieve associated soft tissue tightness, improve joint mobility, decrease pain, and improve lymphatic drainage to increase ROM.   - Performed cross friction massage to L wrist to decrease edema, improve joint mobility, decrease pain, and promote proper healing via increased circulation.     Therapeutic exercises to develop strength, endurance, ROM, and flexibility for 31 minutes, including:  - Updated objective measurements, see above.   - AROM in fluidotherapy x 15 min  - Wrist isometrics 4 ways x 10 reps each  - Dart-thrower's motion, pain-free range 2 x 10 reps with yellow band for resistance   - Theraputty HEP, red: molding x 2 min, gripping x 2 min, EDC ext x 5 reps   - Issued green putty for HEP progression   - Kinesiology tape application: 80% space correction over dorsum of L wrist.     Therapeutic activities to improve functional performance for 8 minutes, including:  - Manny balance x 3 min   - Wrist dextericiser x 1 min   - Isospheres x 2 min    - Flexbar oscillations x 2 min    Patient Education and Home Exercises      Education provided:   - HEP in  place  - Progress towards goals     Written Home Exercises Provided: Patient instructed to cont prior HEP.  Exercises were reviewed and Valerie was able to demonstrate them prior to the end of the session.  Valerie demonstrated good  understanding of the HEP provided. See EMR under Patient Instructions for exercises provided during therapy sessions.      ASSESSMENT     Pt participated well and endorses good adherence to HEP. Slight improvements in  strength noted. Remains mostly limited by wrist pain/weakness but demonstrates good response to all education provided.    Valerie is progressing well towards her goals and there are no updates to goals at this time. Pt prognosis is Good.     Pt will continue to benefit from skilled outpatient occupational therapy to address the deficits listed in the problem list on initial evaluation, provide pt/family education and to maximize pt's level of independence in the home and community environment.     Pt's spiritual, cultural and educational needs considered and pt agreeable to plan of care and goals.    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   Short Term Goals (STGs) = Long Term Goals (LTGs); to be met by discharge.  LTG #1: Pt will report a pain level of 4-5 out of 10 at worst with daily hand use. progressing not met 6/6/2024  LTG #2: Pt will demo improved FOTO score by 5-10 points. progressing not met 6/6/2024  LTG #3: Pt will increase left hand  strength by at least 5-10 lbs needed to open jars and carry groceries. progressing not met 6/6/2024  LTG #4: Pt will demonstrate independence with issued HEP, brace wear as needed, and modalities for pain/symptom management. Met, ongoing 6/6/2024    PLAN     Continue skilled occupational therapy with individualized plan of care  focusing on maximizing functional use of patient's left hand.    Updates/Grading for next session: progress as tolerated.    Aruna Gonzales, OTR/L

## 2024-06-10 ENCOUNTER — CLINICAL SUPPORT (OUTPATIENT)
Dept: REHABILITATION | Facility: HOSPITAL | Age: 29
End: 2024-06-10
Payer: COMMERCIAL

## 2024-06-10 DIAGNOSIS — R29.3 POSTURE ABNORMALITY: Primary | ICD-10-CM

## 2024-06-10 DIAGNOSIS — M89.9 SCAPULAR DYSFUNCTION: ICD-10-CM

## 2024-06-10 DIAGNOSIS — R29.898 WEAKNESS OF RIGHT UPPER EXTREMITY: ICD-10-CM

## 2024-06-10 PROCEDURE — 97110 THERAPEUTIC EXERCISES: CPT

## 2024-06-10 PROCEDURE — 97140 MANUAL THERAPY 1/> REGIONS: CPT

## 2024-06-10 PROCEDURE — 97112 NEUROMUSCULAR REEDUCATION: CPT

## 2024-06-10 NOTE — PROGRESS NOTES
Physical Therapy Daily Treatment Note / Discharge Note     Name: Valerie Cole  Clinic Number: [id number]    Therapy Diagnosis:   Encounter Diagnoses   Name Primary?    Posture abnormality Yes    Weakness of right upper extremity     Scapular dysfunction      Physician: Milagro Alexander*    Visit Date: 6/10/2024    Physician Orders: PT Eval and Treat  Medical Diagnosis from Referral: M25.50 (ICD-10-CM) - Arthralgia, unspecified joint  Evaluation Date: 11/10/2023  Authorization Period Expiration: 12/31/2024  Plan of Care Expiration: 7/4/2024  Progress Note Due: 6/24/2024  Visit # / Visits authorized: 22/32  FOTO: 1/ 3    1st FOTO Follow up: 12/28/2023   2nd FOTO Follow up:     Time In: 1108am  Time Out: 1200pm  Total Billable Time: 52 minutes    Precautions: Standard    Subjective     Pt reports: that the shoulder really has been well controlled recently. She is still having a lot of pain in her L foot and R hip. Her L knee as also started bothering her more. She is happy with how her shoulder is and would like to focus more on the hip and foot moving forward.    She was compliant with home exercise program.  Response to previous treatment: no change  Functional change: ongoing    Pain: 2/10  Location: right shoulder (deep inside)      Objective     Passive Range of Motion (degrees):   Shoulder Right Left   Flexion 180 180   Abduction 180 180   ER at 0 80  70   ER at 45 90  80   ER at 90 120  110      Active Range of Motion (degrees):   Shoulder Right Left   Flexion 180 180   Abduction 180 180   ER at 0 80 70         Upper Extremity Strength  (R) UE   (L) UE     Shoulder ER 4/5 Shoulder ER 4/5   Shoulder IR 4/5 Shoulder IR 4/5   Lower Trap 4/5 Lower Trap 4/5   Middle Trap 4/5 Middle Trap 4/5        Valerie received therapeutic exercises to develop strength, endurance, and ROM for 15 minutes including:    Assessment as above  Hand heel rocks, 3 x 8  Kneeling lunges for ankle DF, 3 x 10 on the L    Valerie  "received the following manual therapy techniques: Joint mobilizations were applied to the: shoulder and thoracic spine for 10 minutes, including:    L subtalar whip, grade V  L cuboid manipulation, grade V  L talocrural distraction, grade V  R hip long axis distraction, grade IV and V  R hip lateral glides, grade III    Valerie participated in neuromuscular re-education activities to improve: Coordination, Kinesthetic, Sense, and Proprioception for 27 minutes. The following activities were included:    ER and IR walkouts with GTB with arm at 0, 2 x 10 with 3" holds   Rhythmic stabilization from PT, 3 x 30" with arm at 90 and 120 degrees flexion  Serratus wall slides, 3 x 8  Bridges, 3 x 8  SL clamshells with RTB, 2 x 10 on each side   Self rhythmic stabilization with RTB, arm at 0 and 90, 3 x 30" each  Side lying ER with 4#, 3 x 8 with 5" holds    Valerie participated in dynamic functional therapeutic activities to improve functional performance for 00 minutes, including:    Home Exercises Provided and Patient Education Provided     Education provided:   - HEP  - Plan of care  - Excessive mobility in multiple joints  - importance of improving strength and stability around the shoulder    Written Home Exercises Provided: Patient instructed to cont prior HEP.  Exercises were reviewed and Valerie was able to demonstrate them prior to the end of the session.  Valerie demonstrated good  understanding of the education provided.     See EMR under Patient Instructions for exercises provided  11/10/2023 .    Assessment     Valerie has been treated for a total of 29 visits over the past 30 weeks to address her complaints of R shoulder pain. Over the past few weeks her R shoulder pain as been well controlled and she is performing her HEP as prescribed at this time. She has recently developed more pain in other regions of her body that she would like to focus on at this time. She was educated that she would need to get a " referral from her physician in order to treat these body regions, which she agreed to. So at this time Valerie will be discharged from skilled PT services for her shoulder.    Valerie Is progressing well towards her goals.   Pt prognosis is Good.     Pt will continue to benefit from skilled outpatient physical therapy to address the deficits listed in the problem list box on initial evaluation, provide pt/family education and to maximize pt's level of independence in the home and community environment.     Pt's spiritual, cultural and educational needs considered and pt agreeable to plan of care and goals.     Anticipated barriers to physical therapy: chronicity of symptoms, high Beighton index     Goals:   Short Term Goals (6 Weeks):   1. Pt will be independent with HEP to supplement PT in improving functional use of R UE. (MET)  2. Pt will display full and pain free shoulder flexion and abduction ROM to demonstrate improved GH joint mechanics. (MET)  3. Pt will increase R UE strength by 1/2 MMT grade to demonstrate improved functional strength. (MET)  Long Term Goals (12 Weeks):   1. Pt will improve FOTO score to </=75% to decrease perceived limitation with carrying, moving, and handling objects.(Not assessed)  2. Pt will be able to lift 15# overhead without pain to demonstrate improved overhead strength. (Not met)  3. Pt will increase R UE strength by 1 MMT grade to demonstrate improved functional strength. (MET)  4. Pt will be able to perform all dance activities without pain to demonstrate improved overall QOL. (MET)  5. Pt will be independent with gym routine to demonstrate improved ability to maintain strength outside of the clinic. (MET)    Plan     Discharge from skilled PT services.     GEOVANNA ZARAGOZA, PT   Board Certified in Orthopedic Physical Therapy

## 2024-06-21 ENCOUNTER — PATIENT MESSAGE (OUTPATIENT)
Dept: FAMILY MEDICINE | Facility: CLINIC | Age: 29
End: 2024-06-21
Payer: COMMERCIAL

## 2024-06-21 DIAGNOSIS — M25.50 POLYARTHRALGIA: Primary | ICD-10-CM

## 2024-06-27 ENCOUNTER — PATIENT MESSAGE (OUTPATIENT)
Dept: REHABILITATION | Facility: HOSPITAL | Age: 29
End: 2024-06-27
Payer: COMMERCIAL

## 2024-07-01 ENCOUNTER — PATIENT MESSAGE (OUTPATIENT)
Dept: REHABILITATION | Facility: HOSPITAL | Age: 29
End: 2024-07-01
Payer: COMMERCIAL

## 2024-07-03 ENCOUNTER — CLINICAL SUPPORT (OUTPATIENT)
Dept: REHABILITATION | Facility: HOSPITAL | Age: 29
End: 2024-07-03
Payer: COMMERCIAL

## 2024-07-03 DIAGNOSIS — M25.659 DECREASED RANGE OF HIP MOVEMENT, UNSPECIFIED LATERALITY: Primary | ICD-10-CM

## 2024-07-03 DIAGNOSIS — M25.50 POLYARTHRALGIA: ICD-10-CM

## 2024-07-03 DIAGNOSIS — R29.898 WEAKNESS OF RIGHT HIP: ICD-10-CM

## 2024-07-03 DIAGNOSIS — R55 SYNCOPE, UNSPECIFIED SYNCOPE TYPE: Primary | ICD-10-CM

## 2024-07-03 PROCEDURE — 97161 PT EVAL LOW COMPLEX 20 MIN: CPT

## 2024-07-03 PROCEDURE — 97110 THERAPEUTIC EXERCISES: CPT

## 2024-07-05 PROBLEM — R29.898 WEAKNESS OF RIGHT HIP: Status: ACTIVE | Noted: 2024-07-05

## 2024-07-05 PROBLEM — M25.659 DECREASED RANGE OF HIP MOVEMENT: Status: ACTIVE | Noted: 2024-07-05

## 2024-07-05 NOTE — PLAN OF CARE
[facility]   Physical Therapy Initial Evaluation      Name: Valerie Cole  [facility] Number: [id number]    Therapy Diagnosis:   Encounter Diagnoses   Name Primary?    Polyarthralgia     Decreased range of hip movement, unspecified laterality Yes    Weakness of right hip         Physician: Christoph Watson MD    Physician Orders: PT Eval and Treat   Medical Diagnosis from Referral: M25.50 (ICD-10-CM) - Polyarthralgia   Evaluation Date: 7/3/2024  Authorization Period Expiration: 12/31/2024  Plan of Care Expiration: 9/25/2024  Progress Note Due: 8/3/2024  Date of Surgery: N/A  Visit # / Visits authorized: 1/ 1   FOTO: 1/ 3    Precautions: Standard     Time In: 304pm  Time Out: 354pm  Total Billable Time: 50 minutes    Subjective     Date of onset: many years    History of current condition - Valerie reports: that she has dealt with a long history of of low back, R hip, and B knee pain. She has also developed recent L foot pain. At this time she feels like the low back and R hip pain is bothering her the most. Her low back pain tends to bother her most with prolonged sitting, standing, laying, and with transitioning from sitting to standing. Her hip tends to lock up on her and she has to pop it to help get it moving. 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 The hip pain is in the groin and feels deep.     Falls: None    Imaging: See Epic.    Prior Therapy: [facility] for this issue  Social History: 1 story home  Occupation: Part time dance teacher, /   Prior Level of Function: no pain or difficulty with ADLs and dance activities   Current Level of Function: moderate pain and difficulty with ADLs and dance activities     Pain:  Current 2/10, worst 7/10, best 0/10   Location: bilateral low back and R anterior hip/groin (deep in the joint)  Description: Aching, Dull, and Sharp  Aggravating Factors: prolonged sitting, standing, laying, and  with transitioning from sitting to standing, and dancing  Easing Factors: pain medication and movement    Patients goals: to get her pain under control      Medical History:   Past Medical History:   Diagnosis Date    Asthma     GERD (gastroesophageal reflux disease)     Headache     Kidney stones        Surgical History:   Valerie Cole  has no past surgical history on file.    Medications:   Valerie has a current medication list which includes the following prescription(s): albuterol, albuterol-budesonide, amitriptyline, azelastine, azithromycin, cetirizine, ergocalciferol (vitamin d2), fluticasone propionate, trelegy ellipta, lactobac no.41/bifidobact no.7, montelukast, multivitamin, mupirocin, omeprazole, ondansetron, promethazine-dextromethorphan, tacrolimus, and ubrelvy.    Allergies:   Review of patient's allergies indicates:  No Known Allergies     Objective      Observation: pt ambulates into the clinic today independently and in no acute distress at this time.     Posture: pt stands with a kypho-lordotic posture with an excessive anterior pelvic tilt at rest.     LUMBAR SPINE AROM:   Flexion: 100%   Extension: 100% (hinge in lower lumbar spine, low back pain)   Left Sidebend: 75% (hinge in lower lumbar spine)   Right Sidebend: 75% (hinge in lower lumbar spine)     SEGMENTAL MOBILITY: there are no restrictions in her segmental mobility at this time    LOWER EXTREMITY PROM   Left Right     Hip flex 120 95 (limited by groin pain)   Hip ext 10 10 (pain)   Hip IR 35 15 (limited by groin pain)   Hip ER 45 45 (pain)   Hip Abd 45 45     LOWER EXTREMITY STRENGTH:   Left Right     PGM 3/5 3/5   Hip IR 4/5 4/5   Hip ER 3+/5 3+/5   Hip Ext 3+/5 3+/5     Hip Special Tests:  Bridge Test: -  BESSIE: + on the R  FADIR: + on the R  AGMR test: + on the R    Special Tests:   Left Right   Slump - -   SLR - -     GAIT: Valerie ambulates with no assistive device with independently.     GAIT DEVIATIONS: Valerie displays no  "noticeable gait deviations       Intake Outcome Measure for FOTO Lumbar Survey    Therapist reviewed FOTO scores for Valerie Cole on 7/3/2024.   FOTO report - see Media section or FOTO account episode details.    Intake Score: 57%         Treatment     Total Treatment time (time-based codes) separate from Evaluation: 10 minutes     Valerie received the treatments listed below:      therapeutic exercises to develop strength, endurance, and ROM for 10 minutes including:    Bridges, 15x  SL clamshells with RTB, 10x each side  Pretzels, 10x with 5" holds (R side only)    Patient Education and Home Exercises     Education provided:   - HEP  - Plan of care  - General hypermobility and how it affects joints    Written Home Exercises Provided: Patient instructed to cont prior HEP. Exercises were reviewed and Valerie was able to demonstrate them prior to the end of the session.  Valerie demonstrated good  understanding of the education provided. See EMR under Patient Instructions for exercises provided during therapy sessions.    Assessment     Valerie is a 28 y.o. female referred to outpatient Physical Therapy with a medical diagnosis of Polyarthralgia. Patient presents with a long history of pain in multiple joints. PT and patient discussed most limited pain at this time and she mentioned that her low back and hip were most bothersome at this time. She demonstrates painful lumbar spine AROM, decreased and painful R hip ROM, + R hip special testing, bilateral hip weakness, and impaired overall functional ability.     Patient prognosis is Good.   Patient will benefit from skilled outpatient Physical Therapy to address the deficits stated above and in the chart below, provide patient /family education, and to maximize patientt's level of independence.     Plan of care discussed with patient: Yes  Patient's spiritual, cultural and educational needs considered and patient is agreeable to the plan of care and goals as " stated below:     Anticipated Barriers for therapy: hypermobility     Medical Necessity is demonstrated by the following  History  Co-morbidities and personal factors that may impact the plan of care [x] LOW: no personal factors / co-morbidities  [] MODERATE: 1-2 personal factors / co-morbidities  [] HIGH: 3+ personal factors / co-morbidities     Moderate / High Support Documentation:   Co-morbidities affecting plan of care: none     Personal Factors:   no deficits      Examination  Body Structures and Functions, activity limitations and participation restrictions that may impact the plan of care [] LOW: addressing 1-2 elements  [x] MODERATE: 3+ elements  [] HIGH: 4+ elements (please support below)     Moderate / High Support Documentation: strength, motor control, joint mobility      Clinical Presentation [x] LOW: stable  [] MODERATE: Evolving  [] HIGH: Unstable      Decision Making/ Complexity Score: low           Goals:  Short Term Goals (6 Weeks):  1. Pt will be compliant with HEP to supplement PT in decreasing pain with functional mobility.  2. Pt will perform pallof press with good control to demonstrate improved core strength.  3. Pt will improve R hip flexion ROM by 10 degrees to improve functional mobility.  4. Pt will improve impaired LE strength by 1/2 MMT grade to improve strength for functional tasks.  Long Term Goals (12 Weeks):   1. Pt will improve FOTO score to </= 71% to decrease perceived limitation with maintaining/changing body position.   2. Pt will perform RDLs with good control to demonstrate improved core strength.  3. Pt will improve R hip flexion ROM by 15 degrees to improve functional mobility.  4. Pt will improve impaired LE strength by 1 MMT grade to improve strength for functional tasks.    Plan     Plan of care Certification: 7/3/2024 to 9/25/2024.    Outpatient Physical Therapy 1 times weekly for 12 weeks to include the following interventions: Gait Training, Manual Therapy, Moist  Heat/ Ice, Neuromuscular Re-ed, Patient Education, Self Care, Therapeutic Activities, and Therapeutic Exercise.     GEOVANNA ZARAGOZA, PT    Physician's Signature: _________________________________________ Date: ________________

## 2024-07-09 ENCOUNTER — CLINICAL SUPPORT (OUTPATIENT)
Dept: REHABILITATION | Facility: HOSPITAL | Age: 29
End: 2024-07-09
Payer: COMMERCIAL

## 2024-07-09 ENCOUNTER — PATIENT MESSAGE (OUTPATIENT)
Dept: RESEARCH | Facility: HOSPITAL | Age: 29
End: 2024-07-09
Payer: COMMERCIAL

## 2024-07-09 DIAGNOSIS — R29.898 WEAKNESS OF RIGHT HIP: ICD-10-CM

## 2024-07-09 DIAGNOSIS — M25.659 DECREASED RANGE OF HIP MOVEMENT, UNSPECIFIED LATERALITY: Primary | ICD-10-CM

## 2024-07-09 PROCEDURE — 97140 MANUAL THERAPY 1/> REGIONS: CPT

## 2024-07-09 PROCEDURE — 97110 THERAPEUTIC EXERCISES: CPT

## 2024-07-09 PROCEDURE — 97112 NEUROMUSCULAR REEDUCATION: CPT

## 2024-07-09 NOTE — PROGRESS NOTES
"  Physical Therapy Daily Treatment Note     Name: Valerie Cole  Clinic Number: [id number]    Therapy Diagnosis:   Encounter Diagnoses   Name Primary?    Decreased range of hip movement, unspecified laterality Yes    Weakness of right hip      Physician: Milagro Alexander*    Visit Date: 7/9/2024    Physician Orders: PT Eval and Treat   Medical Diagnosis from Referral: M25.50 (ICD-10-CM) - Polyarthralgia   Evaluation Date: 7/3/2024  Authorization Period Expiration: 12/31/2024  Plan of Care Expiration: 9/25/2024  Progress Note Due: 8/3/2024  Date of Surgery: N/A  Visit # / Visits authorized: 23/32  FOTO: 1/ 3    1st FOTO Follow up:   2nd FOTO Follow up:     Time In: 403pm  Time Out: 500pm  Total Billable Time: 50 minutes    Precautions: Standard    Subjective     Pt reports: that her hip has been bothering her a bit. She still notices that it pops often.  She was compliant with home exercise program.  Response to previous treatment: no change  Functional change: ongoing    Pain: 3/10  Location: bilateral low back and R anterior hip/groin (deep in the joint)      Objective     Valerie received therapeutic exercises to develop strength, endurance, and ROM for 10 minutes including:    Hand heel rocks, 3 x 8  DL shuttle leg press, 75#, 3 x 10  SL shuttle leg press, 37#, 2 x 10 (R side only)    Valerie received the following manual therapy techniques: Joint mobilizations were applied to the: spine and hips for 10 minutes, including:    R hip long axis distraction, grade IV  R hip inferior mobs + flexion, grade III and IV  R hip lateral mobs, grade III    Valerie participated in neuromuscular re-education activities to improve: Coordination, Kinesthetic, Sense, and Proprioception for 30 minutes. The following activities were included:    Bridges, 3 x 8  SL clamshells with RTB, 2 x 10 each side  SL pretzels with 2#, 2 x 10 with 5" holds (R side only)  Quadruped alternating leg, 2 x 10  Paloff press with 10#, 2 x " 10 each side  Hip hinges with dowel tiffany, 3 x 10    Valerie participated in dynamic functional therapeutic activities to improve functional performance for 0  minutes, including:    Home Exercises Provided and Patient Education Provided     Education provided:   - HEP  - Plan of care  - General hypermobility and how it affects joints    Written Home Exercises Provided: Patient instructed to cont prior HEP.  Exercises were reviewed and Valerie was able to demonstrate them prior to the end of the session.  Valerie demonstrated good  understanding of the education provided.     See EMR under Patient Instructions for exercises provided  7/3/2024 .    Assessment     Valerie remains with limited R hip ROM at this time that seems to be related to pain rather than joint stiffness. Manual interventions were applied today and were tolerated well. She was challenged with hip intrinsic and glut strengthening today. Core stabilization activities were initiated today as well.     Valerie Is progressing well towards her goals.   Pt prognosis is Good.     Pt will continue to benefit from skilled outpatient physical therapy to address the deficits listed in the problem list box on initial evaluation, provide pt/family education and to maximize pt's level of independence in the home and community environment.     Pt's spiritual, cultural and educational needs considered and pt agreeable to plan of care and goals.     Anticipated barriers to physical therapy: hypermobility    Goals:   Short Term Goals (6 Weeks):  1. Pt will be compliant with HEP to supplement PT in decreasing pain with functional mobility.  2. Pt will perform pallof press with good control to demonstrate improved core strength.  3. Pt will improve R hip flexion ROM by 10 degrees to improve functional mobility.  4. Pt will improve impaired LE strength by 1/2 MMT grade to improve strength for functional tasks.  Long Term Goals (12 Weeks):   1. Pt will improve FOTO  score to </= 71% to decrease perceived limitation with maintaining/changing body position.   2. Pt will perform RDLs with good control to demonstrate improved core strength.  3. Pt will improve R hip flexion ROM by 15 degrees to improve functional mobility.  4. Pt will improve impaired LE strength by 1 MMT grade to improve strength for functional tasks.    Plan     Continue to progress per POC.    GEOVANNA ZARAGOZA, PT

## 2024-07-22 ENCOUNTER — CLINICAL SUPPORT (OUTPATIENT)
Dept: REHABILITATION | Facility: HOSPITAL | Age: 29
End: 2024-07-22
Payer: COMMERCIAL

## 2024-07-22 DIAGNOSIS — R29.898 WEAKNESS OF RIGHT HIP: ICD-10-CM

## 2024-07-22 DIAGNOSIS — M25.659 DECREASED RANGE OF HIP MOVEMENT, UNSPECIFIED LATERALITY: Primary | ICD-10-CM

## 2024-07-22 PROCEDURE — 97110 THERAPEUTIC EXERCISES: CPT

## 2024-07-22 PROCEDURE — 97140 MANUAL THERAPY 1/> REGIONS: CPT

## 2024-07-22 PROCEDURE — 97112 NEUROMUSCULAR REEDUCATION: CPT

## 2024-07-22 NOTE — PROGRESS NOTES
"  Physical Therapy Daily Treatment Note     Name: Valerie Cole  Clinic Number: [id number]    Therapy Diagnosis:   Encounter Diagnoses   Name Primary?    Decreased range of hip movement, unspecified laterality Yes    Weakness of right hip        Physician: Milagro Alexander*    Visit Date: 7/22/2024    Physician Orders: PT Eval and Treat   Medical Diagnosis from Referral: M25.50 (ICD-10-CM) - Polyarthralgia   Evaluation Date: 7/3/2024  Authorization Period Expiration: 12/31/2024  Plan of Care Expiration: 9/25/2024  Progress Note Due: 8/3/2024  Date of Surgery: N/A  Visit # / Visits authorized: 24/32  FOTO: 1/ 3    1st FOTO Follow up:   2nd FOTO Follow up:     Time In: 507pm  Time Out: 600pm  Total Billable Time: 50 minutes    Precautions: Standard    Subjective     Pt reports: that her back has locked up on her last week and she had about 2 days of pain but this has since resolved. Her R hip is still popping a good bit and this does bother her some as well.   She was compliant with home exercise program.  Response to previous treatment: no change  Functional change: ongoing    Pain: 3/10  Location: bilateral low back and R anterior hip/groin (deep in the joint)      Objective     Valerie received therapeutic exercises to develop strength, endurance, and ROM for 10 minutes including:    Hand heel rocks, 3 x 8  DL shuttle leg press, 75#, 3 x 10  SL shuttle leg press, 37#, 2 x 10 (R side only)    Valerie received the following manual therapy techniques: Joint mobilizations were applied to the: spine and hips for 10 minutes, including:    R hip long axis distraction, grade IV  R hip inferior mobs + flexion, grade III and IV  R hip lateral mobs, grade III  Prone manual hip ER and IR activation, 3 x 30"    Valerie participated in neuromuscular re-education activities to improve: Coordination, Kinesthetic, Sense, and Proprioception for 30 minutes. The following activities were included:    Prone hip extension, 3 " "x 5 each side   Bridges with GTB, 3 x 8  SL clamshells with GTB, 2 x 10 each side  SL pretzels with 2#, 2 x 10 with 5" holds (R side only)  Quadruped alternating leg, 2 x 10  Paloff press with 10#, 2 x 10 each side  Hip hinges with dowel tiffany, 3 x 10    Valerie participated in dynamic functional therapeutic activities to improve functional performance for 0  minutes, including:    Home Exercises Provided and Patient Education Provided     Education provided:   - HEP  - Plan of care  - General hypermobility and how it affects joints    Written Home Exercises Provided: Patient instructed to cont prior HEP.  Exercises were reviewed and Valerie was able to demonstrate them prior to the end of the session.  Valerie demonstrated good  understanding of the education provided.     See EMR under Patient Instructions for exercises provided  7/3/2024 .    Assessment     Valerie's hip flexion and IR was improved today compared to previous sessions but her IR ROM is painful at her end range. She continues to be challenged with hip intrinsic strengthening and motor control to help address her hip hypermobility. Glut strengthening was progressed today with good tolerance.     Valerie Is progressing well towards her goals.   Pt prognosis is Good.     Pt will continue to benefit from skilled outpatient physical therapy to address the deficits listed in the problem list box on initial evaluation, provide pt/family education and to maximize pt's level of independence in the home and community environment.     Pt's spiritual, cultural and educational needs considered and pt agreeable to plan of care and goals.     Anticipated barriers to physical therapy: hypermobility    Goals:   Short Term Goals (6 Weeks):  1. Pt will be compliant with HEP to supplement PT in decreasing pain with functional mobility.  2. Pt will perform pallof press with good control to demonstrate improved core strength.  3. Pt will improve R hip flexion ROM by " 10 degrees to improve functional mobility.  4. Pt will improve impaired LE strength by 1/2 MMT grade to improve strength for functional tasks.  Long Term Goals (12 Weeks):   1. Pt will improve FOTO score to </= 71% to decrease perceived limitation with maintaining/changing body position.   2. Pt will perform RDLs with good control to demonstrate improved core strength.  3. Pt will improve R hip flexion ROM by 15 degrees to improve functional mobility.  4. Pt will improve impaired LE strength by 1 MMT grade to improve strength for functional tasks.    Plan     Continue to progress per POC.    GEOVANNA ZARAGOZA, PT, DPT  Board Certified in Orthopedic Physical Therapy

## 2024-07-30 ENCOUNTER — CLINICAL SUPPORT (OUTPATIENT)
Dept: REHABILITATION | Facility: HOSPITAL | Age: 29
End: 2024-07-30
Payer: COMMERCIAL

## 2024-07-30 DIAGNOSIS — M25.651 DECREASED RANGE OF RIGHT HIP MOVEMENT: Primary | ICD-10-CM

## 2024-07-30 DIAGNOSIS — R29.898 WEAKNESS OF RIGHT HIP: ICD-10-CM

## 2024-07-30 PROCEDURE — 97112 NEUROMUSCULAR REEDUCATION: CPT

## 2024-07-30 PROCEDURE — 97110 THERAPEUTIC EXERCISES: CPT

## 2024-07-30 PROCEDURE — 97140 MANUAL THERAPY 1/> REGIONS: CPT

## 2024-07-30 PROCEDURE — 97530 THERAPEUTIC ACTIVITIES: CPT

## 2024-08-06 ENCOUNTER — CLINICAL SUPPORT (OUTPATIENT)
Dept: REHABILITATION | Facility: HOSPITAL | Age: 29
End: 2024-08-06
Payer: COMMERCIAL

## 2024-08-06 DIAGNOSIS — M25.659 DECREASED RANGE OF HIP MOVEMENT, UNSPECIFIED LATERALITY: Primary | ICD-10-CM

## 2024-08-06 DIAGNOSIS — R29.898 WEAKNESS OF RIGHT HIP: ICD-10-CM

## 2024-08-06 PROCEDURE — 97112 NEUROMUSCULAR REEDUCATION: CPT

## 2024-08-06 PROCEDURE — 97140 MANUAL THERAPY 1/> REGIONS: CPT

## 2024-08-06 PROCEDURE — 97530 THERAPEUTIC ACTIVITIES: CPT

## 2024-08-06 PROCEDURE — 97110 THERAPEUTIC EXERCISES: CPT

## 2024-08-13 ENCOUNTER — PATIENT MESSAGE (OUTPATIENT)
Dept: REHABILITATION | Facility: HOSPITAL | Age: 29
End: 2024-08-13
Payer: COMMERCIAL

## 2024-08-22 ENCOUNTER — CLINICAL SUPPORT (OUTPATIENT)
Dept: REHABILITATION | Facility: HOSPITAL | Age: 29
End: 2024-08-22
Payer: COMMERCIAL

## 2024-08-22 DIAGNOSIS — R29.898 WEAKNESS OF RIGHT HIP: ICD-10-CM

## 2024-08-22 DIAGNOSIS — M25.659 DECREASED RANGE OF HIP MOVEMENT, UNSPECIFIED LATERALITY: Primary | ICD-10-CM

## 2024-08-22 PROCEDURE — 97110 THERAPEUTIC EXERCISES: CPT

## 2024-08-22 PROCEDURE — 97140 MANUAL THERAPY 1/> REGIONS: CPT

## 2024-08-22 PROCEDURE — 97112 NEUROMUSCULAR REEDUCATION: CPT

## 2024-08-22 NOTE — PROGRESS NOTES
Physical Therapy Daily Treatment Note / Progress Note     Name: Valerie Cole  Clinic Number: [id number]    Therapy Diagnosis:   Encounter Diagnoses   Name Primary?    Decreased range of hip movement, unspecified laterality Yes    Weakness of right hip      Physician: Milagro Alexander*    Visit Date: 8/22/2024    Physician Orders: PT Eval and Treat   Medical Diagnosis from Referral: M25.50 (ICD-10-CM) - Polyarthralgia   Evaluation Date: 7/3/2024  Authorization Period Expiration: 12/31/2024  Plan of Care Expiration: 9/25/2024  Progress Note Due: 9/15/2024  Date of Surgery: N/A  Visit # / Visits authorized: 27/42  FOTO: 1/ 3    1st FOTO Follow up:   2nd FOTO Follow up:     Time In: 210pm  Time Out: 300pm  Total Billable Time: 50 minutes    Precautions: Standard    Subjective     Pt reports: that the hip has been doing well. Still having the popping and clicking but the pain is minimal. Her low back has been locking up on her more often on the left side.   She was compliant with home exercise program.  Response to previous treatment: no change  Functional change: ongoing    Pain: 3/10  Location: bilateral low back and R anterior hip/groin (deep in the joint)      Objective     LUMBAR SPINE AROM:   Flexion: 100%   Extension: 100% (hinge in lower lumbar spine, left low back pain)   Left Sidebend: 75%    Right Sidebend: 75%       SEGMENTAL MOBILITY: there are no restrictions in her segmental mobility at this time     LOWER EXTREMITY PROM    Left Right      Hip flex 120 110   Hip ext 10 10 (   Hip IR 35 25 (pain)   Hip ER 45 45 (pain)   Hip Abd 45 45      LOWER EXTREMITY STRENGTH:    Left Right      PGM 3/5 3/5   Hip IR 4/5 4/5   Hip ER 3+/5 3+/5   Hip Ext 3+/5 3+/5          Treatment     Valerie received therapeutic exercises to develop strength, endurance, and ROM for 12 minutes including:    Assessment as above  Hand heel rocks, 3 x 8  Prone hip extension, 3 x 5 each side     Valerie received the following  "manual therapy techniques: Joint mobilizations were applied to the: spine and hips for 13 minutes, including:    Prone manual hip ER and IR activation, 3 x 30"  Side lying L SI joint manipulation, grade V  Side lying left L5-S1 manipulation, grade V    Not today:  R hip long axis distraction, grade IV  R hip lateral mobs, grade III  R hip inferior mobs + flexion, grade III and IV    Valerie participated in neuromuscular re-education activities to improve: Coordination, Kinesthetic, Sense, and Proprioception for 25 minutes. The following activities were included:    SL pretzels with 2#, 2 x 10 with 5" holds (R side only)  SL reverse clamshells with YTB, 3 x 8  (R side only)  Single leg bridges with leg crossed over: 3x10  Quadruped bird dogs, 2 x 10  Side plank + clamshells with GTB, 3 x 6 each side  Paloff press with 10#, 2 x 10 each side    Valerie participated in dynamic functional therapeutic activities to improve functional performance for 00 minutes, including:    Not today:  Step ups on 6" step + 10# lat pull down, 2 x 10 on each side  Standing hip hinges: 2x10  Deadlift with PVC pipe: 2x10    Home Exercises Provided and Patient Education Provided     Education provided:   - HEP  - Plan of care  - General hypermobility and how it affects joints    Written Home Exercises Provided: Patient instructed to cont prior HEP.  Exercises were reviewed and Valerie was able to demonstrate them prior to the end of the session.  Valerie demonstrated good  understanding of the education provided.     See EMR under Patient Instructions for exercises provided  7/3/2024 .    Assessment     Valerie has been treated for 6 visits over the past 7 weeks to address her complaints of R hip and low back pain. Since the onset of treatment her R hip pain has been well controlled, though she does still remains with complaints of mechanical symptoms. Her low back pain has been a bit more flared up over the past few weeks and this has " become more of a focus recently. Her hip ROM has improve and become less painful recently but hip weakness remains. Valerie remains a great candidate for skilled PT services at this time.     Valerie Is progressing well towards her goals.   Pt prognosis is Good.     Pt will continue to benefit from skilled outpatient physical therapy to address the deficits listed in the problem list box on initial evaluation, provide pt/family education and to maximize pt's level of independence in the home and community environment.     Pt's spiritual, cultural and educational needs considered and pt agreeable to plan of care and goals.     Anticipated barriers to physical therapy: hypermobility    Goals:   Short Term Goals (6 Weeks):  1. Pt will be compliant with HEP to supplement PT in decreasing pain with functional mobility. (MET)  2. Pt will perform pallof press with good control to demonstrate improved core strength. (Progressing)  3. Pt will improve R hip flexion ROM by 10 degrees to improve functional mobility. (Progressing)  4. Pt will improve impaired LE strength by 1/2 MMT grade to improve strength for functional tasks. (Progressing)  Long Term Goals (12 Weeks):   1. Pt will improve FOTO score to </= 71% to decrease perceived limitation with maintaining/changing body position. (Progressing)  2. Pt will perform RDLs with good control to demonstrate improved core strength. (Progressing)  3. Pt will improve R hip flexion ROM by 15 degrees to improve functional mobility. (Progressing)  4. Pt will improve impaired LE strength by 1 MMT grade to improve strength for functional tasks. (Progressing)    Plan     Continue to progress per POC.    GEOVANNA ZARAGOZA, PT, DPT  Board Certified in Orthopedic Physical Therapy

## 2024-08-27 ENCOUNTER — CLINICAL SUPPORT (OUTPATIENT)
Dept: REHABILITATION | Facility: HOSPITAL | Age: 29
End: 2024-08-27
Payer: COMMERCIAL

## 2024-08-27 DIAGNOSIS — M25.659 DECREASED RANGE OF HIP MOVEMENT, UNSPECIFIED LATERALITY: Primary | ICD-10-CM

## 2024-08-27 DIAGNOSIS — R29.898 WEAKNESS OF RIGHT HIP: ICD-10-CM

## 2024-08-27 PROCEDURE — 97110 THERAPEUTIC EXERCISES: CPT

## 2024-08-27 PROCEDURE — 97140 MANUAL THERAPY 1/> REGIONS: CPT

## 2024-08-27 PROCEDURE — 97112 NEUROMUSCULAR REEDUCATION: CPT

## 2024-08-27 NOTE — PROGRESS NOTES
Physical Therapy Daily Treatment Note     Name: Valerie Cole  Clinic Number: [id number]    Therapy Diagnosis:   Encounter Diagnoses   Name Primary?    Decreased range of hip movement, unspecified laterality Yes    Weakness of right hip        Physician: Milagro Alexander*    Visit Date: 8/27/2024    Physician Orders: PT Eval and Treat   Medical Diagnosis from Referral: M25.50 (ICD-10-CM) - Polyarthralgia   Evaluation Date: 7/3/2024  Authorization Period Expiration: 12/31/2024  Plan of Care Expiration: 9/25/2024  Progress Note Due: 9/15/2024  Date of Surgery: N/A  Visit # / Visits authorized: 28/42  FOTO: 1/ 3    1st FOTO Follow up:   2nd FOTO Follow up:     Time In: 403pm  Time Out: 456pm  Total Billable Time: 50 minutes    Precautions: Standard    Subjective     Pt reports: the hip continues to do well but the low back is still bothering her the most. She even had some pain up her back a bit more.   She was compliant with home exercise program.  Response to previous treatment: no change  Functional change: ongoing    Pain: 3/10  Location: bilateral low back and R anterior hip/groin (deep in the joint)      Objective         Treatment     Valerie received therapeutic exercises to develop strength, endurance, and ROM for 12 minutes including:    Hand heel rocks, 3 x 8  Prone hip extension, 3 x 5 each side   Seated thoracic extension over a chair, 3 x 8    Valerie received the following manual therapy techniques: Joint mobilizations were applied to the: spine and hips for 13 minutes, including:    Side lying TL junction manipulation on the R, grade V  Prone thoracic spine manipulation, grade V  Side lying L SI joint manipulation, grade V  Side lying left L5-S1 manipulation, grade V    Not today:  R hip long axis distraction, grade IV  R hip lateral mobs, grade III  R hip inferior mobs + flexion, grade III and IV    Valerie participated in neuromuscular re-education activities to improve: Coordination,  "Kinesthetic, Sense, and Proprioception for 25 minutes. The following activities were included:    Single leg bridges with leg crossed over: 3x10  Bridges with BTB + sports cord press out, 3 x 6 10" holds  Quadruped bird dogs, 2 x 8 with 10" holds  Side plank + clamshells with GTB, 3 x 6 each side  Paloff press with 10#, 2 x 10 each side    Not today:  SL pretzels with 2#, 2 x 10 with 5" holds (R side only)  SL reverse clamshells with YTB, 3 x 8  (R side only)    Valerie participated in dynamic functional therapeutic activities to improve functional performance for 00 minutes, including:    Not today:  Step ups on 6" step + 10# lat pull down, 2 x 10 on each side  Standing hip hinges: 2x10  Deadlift with PVC pipe: 2x10    Home Exercises Provided and Patient Education Provided     Education provided:   - HEP  - Plan of care  - General hypermobility and how it affects joints    Written Home Exercises Provided: Patient instructed to cont prior HEP.  Exercises were reviewed and Valerie was able to demonstrate them prior to the end of the session.  Valerie demonstrated good  understanding of the education provided.     See EMR under Patient Instructions for exercises provided  7/3/2024 .    Assessment     Valerie continues with more low back pain than hip pain over the past few weeks. Her hip ROM was pain free today but lumbar extension caused some R sided TL junction pain and this are was found to be a bit stiff. Following TL junction manipulation her pain was resolved. Exercises today focused on core and hip strengthening with longer isometric holds.     Valerie Is progressing well towards her goals.   Pt prognosis is Good.     Pt will continue to benefit from skilled outpatient physical therapy to address the deficits listed in the problem list box on initial evaluation, provide pt/family education and to maximize pt's level of independence in the home and community environment.     Pt's spiritual, cultural and " educational needs considered and pt agreeable to plan of care and goals.     Anticipated barriers to physical therapy: hypermobility    Goals:   Short Term Goals (6 Weeks):  1. Pt will be compliant with HEP to supplement PT in decreasing pain with functional mobility. (MET)  2. Pt will perform pallof press with good control to demonstrate improved core strength. (Progressing)  3. Pt will improve R hip flexion ROM by 10 degrees to improve functional mobility. (Progressing)  4. Pt will improve impaired LE strength by 1/2 MMT grade to improve strength for functional tasks. (Progressing)  Long Term Goals (12 Weeks):   1. Pt will improve FOTO score to </= 71% to decrease perceived limitation with maintaining/changing body position. (Progressing)  2. Pt will perform RDLs with good control to demonstrate improved core strength. (Progressing)  3. Pt will improve R hip flexion ROM by 15 degrees to improve functional mobility. (Progressing)  4. Pt will improve impaired LE strength by 1 MMT grade to improve strength for functional tasks. (Progressing)    Plan     Continue to progress per POC.    GEOVANNA ZARAGOZA, PT, DPT  Board Certified in Orthopedic Physical Therapy

## 2024-08-29 ENCOUNTER — PATIENT MESSAGE (OUTPATIENT)
Dept: RHEUMATOLOGY | Facility: CLINIC | Age: 29
End: 2024-08-29
Payer: COMMERCIAL

## 2024-09-03 ENCOUNTER — CLINICAL SUPPORT (OUTPATIENT)
Dept: REHABILITATION | Facility: HOSPITAL | Age: 29
End: 2024-09-03
Payer: COMMERCIAL

## 2024-09-03 DIAGNOSIS — M25.659 DECREASED RANGE OF HIP MOVEMENT, UNSPECIFIED LATERALITY: Primary | ICD-10-CM

## 2024-09-03 DIAGNOSIS — R29.898 WEAKNESS OF RIGHT HIP: ICD-10-CM

## 2024-09-03 PROCEDURE — 97530 THERAPEUTIC ACTIVITIES: CPT

## 2024-09-03 PROCEDURE — 97110 THERAPEUTIC EXERCISES: CPT

## 2024-09-03 PROCEDURE — 97112 NEUROMUSCULAR REEDUCATION: CPT

## 2024-09-03 NOTE — PROGRESS NOTES
Physical Therapy Daily Treatment Note     Name: Valerie Cole  Clinic Number: [id number]    Therapy Diagnosis:   Encounter Diagnoses   Name Primary?    Decreased range of hip movement, unspecified laterality Yes    Weakness of right hip      Physician: Milagro Alexander*    Visit Date: 9/3/2024    Physician Orders: PT Eval and Treat   Medical Diagnosis from Referral: M25.50 (ICD-10-CM) - Polyarthralgia   Evaluation Date: 7/3/2024  Authorization Period Expiration: 12/31/2024  Plan of Care Expiration: 9/25/2024  Progress Note Due: 9/15/2024  Date of Surgery: N/A  Visit # / Visits authorized: 29/42  FOTO: 1/ 3    1st FOTO Follow up:   2nd FOTO Follow up:     Time In: 402pm  Time Out: 458pm  Total Billable Time: 52 minutes    Precautions: Standard    Subjective     Pt reports: that symptoms have been doing well over the past week. The hip still needs to pop but doesn't really hurt. The low back locks up every now and then but it is not as intense.   She was compliant with home exercise program.  Response to previous treatment: no change  Functional change: ongoing    Pain: 3/10  Location: bilateral low back and R anterior hip/groin (deep in the joint)      Objective         Treatment     Valerie received therapeutic exercises to develop strength, endurance, and ROM for 12 minutes including:    Hand heel rocks, 3 x 8  Prone hip extension, 3 x 5 each side   Side lying shuttle leg press, 37#, 2 x 10    Valerie received the following manual therapy techniques: Joint mobilizations were applied to the: spine and hips for 00 minutes, including:    Not today:  Side lying TL junction manipulation on the R, grade V  Prone thoracic spine manipulation, grade V  Side lying L SI joint manipulation, grade V  Side lying left L5-S1 manipulation, grade V  R hip long axis distraction, grade IV  R hip lateral mobs, grade III  R hip inferior mobs + flexion, grade III and IV    Valerie participated in neuromuscular re-education  "activities to improve: Coordination, Kinesthetic, Sense, and Proprioception for 28 minutes. The following activities were included:    Bridges with BTB around knees + marching, 3 x 8  Quadruped bird dogs, 3 x 5 with 10" holds  Side plank + clamshells with BTB, 3 x 6 each side  Paloff press with 10#, 2 x 10 each side  SL pretzels with 3#, 2 x 10 with 5" holds (R side only)  SL reverse clamshells with RTB, 3 x 8  (R side only)    Valerie participated in dynamic functional therapeutic activities to improve functional performance for 12 minutes, including:    Step ups on 8" step + 10# lat pull down, 2 x 10 on each side  Hip hinges with dowel tiffany, 3 x 10  Deadlift with PVC pipe: 2x10    Home Exercises Provided and Patient Education Provided     Education provided:   - HEP  - Plan of care  - General hypermobility and how it affects joints    Written Home Exercises Provided: Patient instructed to cont prior HEP.  Exercises were reviewed and Valerie was able to demonstrate them prior to the end of the session.  Valerie demonstrated good  understanding of the education provided.     See EMR under Patient Instructions for exercises provided  7/3/2024 .    Assessment     Valerie displays no pain with lumbar spine AROM or hip passive ROM today so manual interventions were held for the day. She was further challenged with isolated hip strengthening to address her continued mechanical type symptoms. Isometric holds were the focus of her lumbar stabilization exercises today. Movement coordination exercises were reinitiated today with good tolerance.     Valerie Is progressing well towards her goals.   Pt prognosis is Good.     Pt will continue to benefit from skilled outpatient physical therapy to address the deficits listed in the problem list box on initial evaluation, provide pt/family education and to maximize pt's level of independence in the home and community environment.     Pt's spiritual, cultural and educational " needs considered and pt agreeable to plan of care and goals.     Anticipated barriers to physical therapy: hypermobility    Goals:   Short Term Goals (6 Weeks):  1. Pt will be compliant with HEP to supplement PT in decreasing pain with functional mobility. (MET)  2. Pt will perform pallof press with good control to demonstrate improved core strength. (Progressing)  3. Pt will improve R hip flexion ROM by 10 degrees to improve functional mobility. (Progressing)  4. Pt will improve impaired LE strength by 1/2 MMT grade to improve strength for functional tasks. (Progressing)  Long Term Goals (12 Weeks):   1. Pt will improve FOTO score to </= 71% to decrease perceived limitation with maintaining/changing body position. (Progressing)  2. Pt will perform RDLs with good control to demonstrate improved core strength. (Progressing)  3. Pt will improve R hip flexion ROM by 15 degrees to improve functional mobility. (Progressing)  4. Pt will improve impaired LE strength by 1 MMT grade to improve strength for functional tasks. (Progressing)    Plan     Continue to progress per POC.    GEOVANNA ZARAGOZA, PT, DPT  Board Certified in Orthopedic Physical Therapy

## 2024-09-10 ENCOUNTER — PATIENT MESSAGE (OUTPATIENT)
Dept: REHABILITATION | Facility: HOSPITAL | Age: 29
End: 2024-09-10
Payer: COMMERCIAL

## 2024-09-11 ENCOUNTER — PATIENT MESSAGE (OUTPATIENT)
Dept: PULMONOLOGY | Facility: CLINIC | Age: 29
End: 2024-09-11
Payer: COMMERCIAL

## 2024-09-17 ENCOUNTER — CLINICAL SUPPORT (OUTPATIENT)
Dept: REHABILITATION | Facility: HOSPITAL | Age: 29
End: 2024-09-17
Payer: COMMERCIAL

## 2024-09-17 DIAGNOSIS — M25.659 DECREASED RANGE OF HIP MOVEMENT, UNSPECIFIED LATERALITY: Primary | ICD-10-CM

## 2024-09-17 DIAGNOSIS — R29.898 WEAKNESS OF RIGHT HIP: ICD-10-CM

## 2024-09-17 PROCEDURE — 97530 THERAPEUTIC ACTIVITIES: CPT

## 2024-09-17 PROCEDURE — 97140 MANUAL THERAPY 1/> REGIONS: CPT

## 2024-09-17 PROCEDURE — 97112 NEUROMUSCULAR REEDUCATION: CPT

## 2024-09-17 PROCEDURE — 97110 THERAPEUTIC EXERCISES: CPT

## 2024-09-17 NOTE — PROGRESS NOTES
Physical Therapy Daily Treatment Note     Name: Valerie Cole  Clinic Number: [id number]    Therapy Diagnosis:   Encounter Diagnoses   Name Primary?    Decreased range of hip movement, unspecified laterality Yes    Weakness of right hip        Physician: Milagro Alexander*    Visit Date: 9/17/2024    Physician Orders: PT Eval and Treat   Medical Diagnosis from Referral: M25.50 (ICD-10-CM) - Polyarthralgia   Evaluation Date: 7/3/2024  Authorization Period Expiration: 12/31/2024  Plan of Care Expiration: 9/25/2024  Progress Note Due: 9/15/2024  Date of Surgery: N/A  Visit # / Visits authorized: 30/42  FOTO: 1/ 3    1st FOTO Follow up:   2nd FOTO Follow up:     Time In: 330pm  Time Out: 430pm  Total Billable Time: 55 minutes    Precautions: Standard    Subjective     Pt reports: that she started having pain pain and tightness in her upper back between the shoulder blades after sleeping in a different bed than normal. It has since improved. Her SI joint is still locking up on her randomly. Her R hip pain has changed a bit and is more bothersome at the front of the hip instead of the groin. .   She was compliant with home exercise program.  Response to previous treatment: no change  Functional change: ongoing    Pain: 3/10  Location: bilateral low back and R anterior hip/groin (deep in the joint)      Objective         Treatment     Valerie received therapeutic exercises to develop strength, endurance, and ROM for 10 minutes including:    Hand heel rocks, 3 x 8  Prone hip extension, 3 x 5 each side   Side lying shuttle leg press, 37#, 2 x 10    Valerie received the following manual therapy techniques: Joint mobilizations were applied to the: spine and hips for 10 minutes, including:    Side lying TL junction manipulation on the R, grade V  Prone thoracic spine manipulation, grade V  R hip long axis distraction, grade IV  R hip lateral mobs, grade III    Not today:  Side lying L SI joint manipulation, grade  "V  Side lying left L5-S1 manipulation, grade V  R hip inferior mobs + flexion, grade III and IV    Valerie participated in neuromuscular re-education activities to improve: Coordination, Kinesthetic, Sense, and Proprioception for 25 minutes. The following activities were included:    Bridges with BTB around knees + marching, 3 x 8  Quadruped bird dogs, 3 x 5 with 10" holds  Side plank + clamshells with BTB, 3 x 6 each side  Paloff press with 10#, 2 x 10 each side  SL pretzels with 3#, 2 x 10 with 5" holds (R side only)  SL reverse clamshells with RTB, 3 x 8  (R side only)    Valerie participated in dynamic functional therapeutic activities to improve functional performance for 10 minutes, including:    Step ups on 8" step + 10# lat pull down, 2 x 10 on each side  Hip hinges with dowel tiffany, 3 x 10  Deadlift with PVC pipe: 2x10    Home Exercises Provided and Patient Education Provided     Education provided:   - HEP  - Plan of care  - General hypermobility and how it affects joints    Written Home Exercises Provided: Patient instructed to cont prior HEP.  Exercises were reviewed and Valerie was able to demonstrate them prior to the end of the session.  Valerie demonstrated good  understanding of the education provided.     See EMR under Patient Instructions for exercises provided  7/3/2024 .    Assessment     Valerie presents today with a nit more symptoms today compared to her previous session. Manual interventions were reinitiated today to help improve her joint mobility and decrease her pain. Her exercises today challenged her hip strength and core stabilization with good tolerance.     Valerie Is progressing well towards her goals.   Pt prognosis is Good.     Pt will continue to benefit from skilled outpatient physical therapy to address the deficits listed in the problem list box on initial evaluation, provide pt/family education and to maximize pt's level of independence in the home and community " environment.     Pt's spiritual, cultural and educational needs considered and pt agreeable to plan of care and goals.     Anticipated barriers to physical therapy: hypermobility    Goals:   Short Term Goals (6 Weeks):  1. Pt will be compliant with HEP to supplement PT in decreasing pain with functional mobility. (MET)  2. Pt will perform pallof press with good control to demonstrate improved core strength. (Progressing)  3. Pt will improve R hip flexion ROM by 10 degrees to improve functional mobility. (Progressing)  4. Pt will improve impaired LE strength by 1/2 MMT grade to improve strength for functional tasks. (Progressing)  Long Term Goals (12 Weeks):   1. Pt will improve FOTO score to </= 71% to decrease perceived limitation with maintaining/changing body position. (Progressing)  2. Pt will perform RDLs with good control to demonstrate improved core strength. (Progressing)  3. Pt will improve R hip flexion ROM by 15 degrees to improve functional mobility. (Progressing)  4. Pt will improve impaired LE strength by 1 MMT grade to improve strength for functional tasks. (Progressing)    Plan     Continue to progress per POC.    GEOVANNA ZARAGOZA, PT, DPT  Board Certified in Orthopedic Physical Therapy

## 2024-09-24 ENCOUNTER — CLINICAL SUPPORT (OUTPATIENT)
Dept: REHABILITATION | Facility: HOSPITAL | Age: 29
End: 2024-09-24
Payer: COMMERCIAL

## 2024-09-24 DIAGNOSIS — R29.898 WEAKNESS OF RIGHT HIP: ICD-10-CM

## 2024-09-24 DIAGNOSIS — M25.659 DECREASED RANGE OF HIP MOVEMENT, UNSPECIFIED LATERALITY: Primary | ICD-10-CM

## 2024-09-24 PROCEDURE — 97110 THERAPEUTIC EXERCISES: CPT

## 2024-09-24 PROCEDURE — 97112 NEUROMUSCULAR REEDUCATION: CPT

## 2024-09-24 PROCEDURE — 97140 MANUAL THERAPY 1/> REGIONS: CPT

## 2024-09-24 NOTE — PROGRESS NOTES
Physical Therapy Daily Treatment Note     Name: Valerie Cole  Clinic Number: [id number]    Therapy Diagnosis:   Encounter Diagnoses   Name Primary?    Decreased range of hip movement, unspecified laterality Yes    Weakness of right hip      Physician: Milagro Alexander*    Visit Date: 9/24/2024    Physician Orders: PT Eval and Treat   Medical Diagnosis from Referral: M25.50 (ICD-10-CM) - Polyarthralgia   Evaluation Date: 7/3/2024  Authorization Period Expiration: 12/31/2024  Plan of Care Expiration: 9/25/2024  Progress Note Due: 9/15/2024  Date of Surgery: N/A  Visit # / Visits authorized: 31/42  FOTO: 1/ 3    1st FOTO Follow up:   2nd FOTO Follow up:     Time In: 407pm  Time Out: 500pm  Total Billable Time: 50 minutes    Precautions: Standard    Subjective     Pt reports: that the pain in her upper back is much better since last week. Her hip has been doing well for the most part. Her low back has still been catching with certain activities. She has a migraine at this time.   She was compliant with home exercise program.  Response to previous treatment: no change  Functional change: ongoing    Pain: 3/10  Location: bilateral low back and R anterior hip/groin (deep in the joint)      Objective         Treatment     Valerie received therapeutic exercises to develop strength, endurance, and ROM for 15 minutes including:    Hand heel rocks, 3 x 8  Prone hip extension, 3 x 5 each side   Seated thoracic extension over a chair, 3 x 8  Side lying shuttle leg press, 37#, 2 x 10    Valerie received the following manual therapy techniques: Joint mobilizations were applied to the: spine and hips for 10 minutes, including:    Side lying TL junction manipulation on the R, grade V  Prone thoracic spine manipulation, grade V  R hip long axis distraction, grade IV  R hip lateral mobs, grade III    Not today:  Side lying L SI joint manipulation, grade V  Side lying left L5-S1 manipulation, grade V  R hip inferior mobs +  "flexion, grade III and IV    Valerie participated in neuromuscular re-education activities to improve: Coordination, Kinesthetic, Sense, and Proprioception for 25 minutes. The following activities were included:    Bridges with BTB around knees + marching, 3 x 8  Quadruped bird dogs, 3 x 5 with 10" holds  Side plank + clamshells with BTB, 3 x 6 each side  Paloff press with 10#, 2 x 10 each side  SL pretzels with 3#, 2 x 10 with 5" holds (R side only)  SL reverse clamshells with RTB, 3 x 8  (R side only)    Valerie participated in dynamic functional therapeutic activities to improve functional performance for 00 minutes, including:    Step ups on 8" step + 10# lat pull down, 2 x 10 on each side  Hip hinges with dowel tiffany, 3 x 10  Deadlift with PVC pipe: 2x10    Home Exercises Provided and Patient Education Provided     Education provided:   - HEP  - Plan of care  - General hypermobility and how it affects joints    Written Home Exercises Provided: Patient instructed to cont prior HEP.  Exercises were reviewed and Valerie was able to demonstrate them prior to the end of the session.  Valerie demonstrated good  understanding of the education provided.     See EMR under Patient Instructions for exercises provided  7/3/2024 .    Assessment     Valerie presents with decreased reports of upper back pain at this time. She remains with instance of her lower lumbar spine "catching" throughout her day. She was educated that seems to be related to her underlying hypermobility and that core stabilization and strengthening will be the best way to address this moving forward. She was able to tolerate all strengthening exercises well today.     Valerie Is progressing well towards her goals.   Pt prognosis is Good.     Pt will continue to benefit from skilled outpatient physical therapy to address the deficits listed in the problem list box on initial evaluation, provide pt/family education and to maximize pt's level of " independence in the home and community environment.     Pt's spiritual, cultural and educational needs considered and pt agreeable to plan of care and goals.     Anticipated barriers to physical therapy: hypermobility    Goals:   Short Term Goals (6 Weeks):  1. Pt will be compliant with HEP to supplement PT in decreasing pain with functional mobility. (MET)  2. Pt will perform pallof press with good control to demonstrate improved core strength. (Progressing)  3. Pt will improve R hip flexion ROM by 10 degrees to improve functional mobility. (Progressing)  4. Pt will improve impaired LE strength by 1/2 MMT grade to improve strength for functional tasks. (Progressing)  Long Term Goals (12 Weeks):   1. Pt will improve FOTO score to </= 71% to decrease perceived limitation with maintaining/changing body position. (Progressing)  2. Pt will perform RDLs with good control to demonstrate improved core strength. (Progressing)  3. Pt will improve R hip flexion ROM by 15 degrees to improve functional mobility. (Progressing)  4. Pt will improve impaired LE strength by 1 MMT grade to improve strength for functional tasks. (Progressing)    Plan     Continue to progress per POC.    GEOVANNA ZARAGOZA, PT, DPT  Board Certified in Orthopedic Physical Therapy

## 2024-10-01 ENCOUNTER — CLINICAL SUPPORT (OUTPATIENT)
Dept: REHABILITATION | Facility: HOSPITAL | Age: 29
End: 2024-10-01
Payer: COMMERCIAL

## 2024-10-01 DIAGNOSIS — R29.898 WEAKNESS OF RIGHT HIP: ICD-10-CM

## 2024-10-01 DIAGNOSIS — M25.659 DECREASED RANGE OF HIP MOVEMENT, UNSPECIFIED LATERALITY: Primary | ICD-10-CM

## 2024-10-01 PROCEDURE — 97110 THERAPEUTIC EXERCISES: CPT

## 2024-10-01 PROCEDURE — 97112 NEUROMUSCULAR REEDUCATION: CPT

## 2024-10-01 PROCEDURE — 97530 THERAPEUTIC ACTIVITIES: CPT

## 2024-10-01 NOTE — PROGRESS NOTES
Physical Therapy Daily Treatment Note / Updated Plan of Care Note     Name: Valerie Cole  Clinic Number: [id number]    Therapy Diagnosis:   Encounter Diagnoses   Name Primary?    Decreased range of hip movement, unspecified laterality Yes    Weakness of right hip        Physician: Milagro Alexander*    Visit Date: 10/1/2024    Physician Orders: PT Eval and Treat   Medical Diagnosis from Referral: M25.50 (ICD-10-CM) - Polyarthralgia   Evaluation Date: 7/3/2024  Authorization Period Expiration: 12/31/2024  Plan of Care Expiration: 11/12/2024  Progress Note Due: 10/30/2024  Date of Surgery: N/A  Visit # / Visits authorized: 32/42  FOTO: 1/ 3    1st FOTO Follow up:   2nd FOTO Follow up:     Time In: 406pm  Time Out: 500pm  Total Billable Time: 50 minutes    Precautions: Standard    Subjective     Pt reports: that the upper back and hip have been doing well all week. The low back is still locking up on her occasionally.   She was compliant with home exercise program.  Response to previous treatment: no change  Functional change: ongoing    Pain: 3/10  Location: bilateral low back and R anterior hip/groin (deep in the joint)      Objective     LUMBAR SPINE AROM:   Flexion: 100%   Extension: 100% (hinge in lower lumbar spine, left low back pain)   Left Sidebend: 75%    Right Sidebend: 75%       SEGMENTAL MOBILITY: there are no restrictions in her segmental mobility at this time     LOWER EXTREMITY PROM    Left Right      Hip flex 120 110   Hip ext 10 10    Hip IR 35 30   Hip ER 45 45    Hip Abd 45 45      LOWER EXTREMITY STRENGTH:    Left Right      PGM 3/5 3/5   Hip IR 4/5 4/5   Hip ER 3+/5 3+/5   Hip Ext 3+/5 3+/5        Treatment     Valerie received therapeutic exercises to develop strength, endurance, and ROM for 15 minutes including:    Assessment as above  Hand heel rocks, 3 x 8  Side lying shuttle leg press, 37#, 2 x 10    Not today:  Prone hip extension, 3 x 5 each side   Seated thoracic extension over a  "chair, 3 x 8    Valerie received the following manual therapy techniques: Joint mobilizations were applied to the: spine and hips for 00 minutes, including:    Not today:  Side lying TL junction manipulation on the R, grade V  Prone thoracic spine manipulation, grade V  R hip long axis distraction, grade IV  R hip lateral mobs, grade III  Side lying L SI joint manipulation, grade V  Side lying left L5-S1 manipulation, grade V  R hip inferior mobs + flexion, grade III and IV    Valerie participated in neuromuscular re-education activities to improve: Coordination, Kinesthetic, Sense, and Proprioception for 25 minutes. The following activities were included:    Triple threat birdges, 4 x 15  Quadruped bird dogs, 3 x 5 with 10" holds  Side plank + clamshells with BTB, 3 x 6 each side  Side steps with paloff press and lift with 10#, 2 x 10 each side  SL pretzels with 3#, 2 x 10 with 5" holds (R side only)    Valerie participated in dynamic functional therapeutic activities to improve functional performance for 10 minutes, including:    Step ups on 8" step + 10# lat pull down, 2 x 10 on each side  Deadlift with 15#, 3 x 10    Home Exercises Provided and Patient Education Provided     Education provided:   - HEP  - Plan of care  - General hypermobility and how it affects joints    Written Home Exercises Provided: Patient instructed to cont prior HEP.  Exercises were reviewed and Valerie was able to demonstrate them prior to the end of the session.  Valerie demonstrated good  understanding of the education provided.     See EMR under Patient Instructions for exercises provided  7/3/2024 .    Assessment     Valerie has been treated for 11 visits over the past 13 weeks to address her complaints of R hip and low back pain. Since the onset of treatment her R hip pain has been well controlled, though she does still remains with complaints of mechanical symptoms. Her low back pain has been a bit more flared up over the " past few weeks and this has become more of a focus recently. Her hip ROM has improve and become less painful recently but hip weakness remains. Valerie remains a great candidate for skilled PT services at this time.     Valerie Is progressing well towards her goals.   Pt prognosis is Good.     Pt will continue to benefit from skilled outpatient physical therapy to address the deficits listed in the problem list box on initial evaluation, provide pt/family education and to maximize pt's level of independence in the home and community environment.     Pt's spiritual, cultural and educational needs considered and pt agreeable to plan of care and goals.     Anticipated barriers to physical therapy: hypermobility    Goals:   Short Term Goals (6 Weeks):  1. Pt will be compliant with HEP to supplement PT in decreasing pain with functional mobility. (MET)  2. Pt will perform pallof press with good control to demonstrate improved core strength. (Progressing)  3. Pt will improve R hip flexion ROM by 10 degrees to improve functional mobility. (Progressing)  4. Pt will improve impaired LE strength by 1/2 MMT grade to improve strength for functional tasks. (Progressing)  Long Term Goals (12 Weeks):   1. Pt will improve FOTO score to </= 71% to decrease perceived limitation with maintaining/changing body position. (Progressing)  2. Pt will perform RDLs with good control to demonstrate improved core strength. (Progressing)  3. Pt will improve R hip flexion ROM by 15 degrees to improve functional mobility. (Progressing)  4. Pt will improve impaired LE strength by 1 MMT grade to improve strength for functional tasks. (Progressing)    Plan     Plan of care Certification: 10/1/2024 to 11/12/2024.     Outpatient Physical Therapy 1 times weekly for 6 weeks to include the following interventions: Gait Training, Manual Therapy, Moist Heat/ Ice, Neuromuscular Re-ed, Patient Education, Self Care, Therapeutic Activities, and Therapeutic  Exercise.     GEOVANNA ZARAGOZA, PT, DPT  Board Certified in Orthopedic Physical Therapy

## 2024-10-07 NOTE — PLAN OF CARE
Physical Therapy Daily Treatment Note / Updated Plan of Care Note     Name: Valerie Cole  Clinic Number: [id number]    Therapy Diagnosis:   Encounter Diagnoses   Name Primary?    Decreased range of hip movement, unspecified laterality Yes    Weakness of right hip        Physician: Milagro Alexander*    Visit Date: 10/1/2024    Physician Orders: PT Eval and Treat   Medical Diagnosis from Referral: M25.50 (ICD-10-CM) - Polyarthralgia   Evaluation Date: 7/3/2024  Authorization Period Expiration: 12/31/2024  Plan of Care Expiration: 11/12/2024  Progress Note Due: 10/30/2024  Date of Surgery: N/A  Visit # / Visits authorized: 32/42  FOTO: 1/ 3    1st FOTO Follow up:   2nd FOTO Follow up:     Time In: 406pm  Time Out: 500pm  Total Billable Time: 50 minutes    Precautions: Standard    Subjective     Pt reports: that the upper back and hip have been doing well all week. The low back is still locking up on her occasionally.   She was compliant with home exercise program.  Response to previous treatment: no change  Functional change: ongoing    Pain: 3/10  Location: bilateral low back and R anterior hip/groin (deep in the joint)      Objective     LUMBAR SPINE AROM:   Flexion: 100%   Extension: 100% (hinge in lower lumbar spine, left low back pain)   Left Sidebend: 75%    Right Sidebend: 75%       SEGMENTAL MOBILITY: there are no restrictions in her segmental mobility at this time     LOWER EXTREMITY PROM    Left Right      Hip flex 120 110   Hip ext 10 10    Hip IR 35 30   Hip ER 45 45    Hip Abd 45 45      LOWER EXTREMITY STRENGTH:    Left Right      PGM 3/5 3/5   Hip IR 4/5 4/5   Hip ER 3+/5 3+/5   Hip Ext 3+/5 3+/5        Treatment     Valerie received therapeutic exercises to develop strength, endurance, and ROM for 15 minutes including:    Assessment as above  Hand heel rocks, 3 x 8  Side lying shuttle leg press, 37#, 2 x 10    Not today:  Prone hip extension, 3 x 5 each side   Seated thoracic extension over a  "chair, 3 x 8    Valerie received the following manual therapy techniques: Joint mobilizations were applied to the: spine and hips for 00 minutes, including:    Not today:  Side lying TL junction manipulation on the R, grade V  Prone thoracic spine manipulation, grade V  R hip long axis distraction, grade IV  R hip lateral mobs, grade III  Side lying L SI joint manipulation, grade V  Side lying left L5-S1 manipulation, grade V  R hip inferior mobs + flexion, grade III and IV    Valerie participated in neuromuscular re-education activities to improve: Coordination, Kinesthetic, Sense, and Proprioception for 25 minutes. The following activities were included:    Triple threat birdges, 4 x 15  Quadruped bird dogs, 3 x 5 with 10" holds  Side plank + clamshells with BTB, 3 x 6 each side  Side steps with paloff press and lift with 10#, 2 x 10 each side  SL pretzels with 3#, 2 x 10 with 5" holds (R side only)    Valerie participated in dynamic functional therapeutic activities to improve functional performance for 10 minutes, including:    Step ups on 8" step + 10# lat pull down, 2 x 10 on each side  Deadlift with 15#, 3 x 10    Home Exercises Provided and Patient Education Provided     Education provided:   - HEP  - Plan of care  - General hypermobility and how it affects joints    Written Home Exercises Provided: Patient instructed to cont prior HEP.  Exercises were reviewed and Valerie was able to demonstrate them prior to the end of the session.  Valerie demonstrated good  understanding of the education provided.     See EMR under Patient Instructions for exercises provided 7/3/2024.    Assessment     Valerie has been treated for 11 visits over the past 13 weeks to address her complaints of R hip and low back pain. Since the onset of treatment her R hip pain has been well controlled, though she does still remains with complaints of mechanical symptoms. Her low back pain has been a bit more flared up over the past " few weeks and this has become more of a focus recently. Her hip ROM has improve and become less painful recently but hip weakness remains. Valerie remains a great candidate for skilled PT services at this time.     Valerie Is progressing well towards her goals.   Pt prognosis is Good.     Pt will continue to benefit from skilled outpatient physical therapy to address the deficits listed in the problem list box on initial evaluation, provide pt/family education and to maximize pt's level of independence in the home and community environment.     Pt's spiritual, cultural and educational needs considered and pt agreeable to plan of care and goals.     Anticipated barriers to physical therapy: hypermobility    Goals:   Short Term Goals (6 Weeks):  1. Pt will be compliant with HEP to supplement PT in decreasing pain with functional mobility. (MET)  2. Pt will perform pallof press with good control to demonstrate improved core strength. (Progressing)  3. Pt will improve R hip flexion ROM by 10 degrees to improve functional mobility. (Progressing)  4. Pt will improve impaired LE strength by 1/2 MMT grade to improve strength for functional tasks. (Progressing)  Long Term Goals (12 Weeks):   1. Pt will improve FOTO score to </= 71% to decrease perceived limitation with maintaining/changing body position. (Progressing)  2. Pt will perform RDLs with good control to demonstrate improved core strength. (Progressing)  3. Pt will improve R hip flexion ROM by 15 degrees to improve functional mobility. (Progressing)  4. Pt will improve impaired LE strength by 1 MMT grade to improve strength for functional tasks. (Progressing)    Plan     Plan of care Certification: 10/1/2024 to 11/12/2024.     Outpatient Physical Therapy 1 times weekly for 6 weeks to include the following interventions: Gait Training, Manual Therapy, Moist Heat/ Ice, Neuromuscular Re-ed, Patient Education, Self Care, Therapeutic Activities, and Therapeutic  Exercise.     GEOVANNA ZARAGOZA, PT, DPT  Board Certified in Orthopedic Physical Therapy

## 2024-10-09 ENCOUNTER — CLINICAL SUPPORT (OUTPATIENT)
Dept: REHABILITATION | Facility: HOSPITAL | Age: 29
End: 2024-10-09
Payer: COMMERCIAL

## 2024-10-09 DIAGNOSIS — R29.898 WEAKNESS OF RIGHT HIP: ICD-10-CM

## 2024-10-09 DIAGNOSIS — M25.659 DECREASED RANGE OF HIP MOVEMENT, UNSPECIFIED LATERALITY: Primary | ICD-10-CM

## 2024-10-09 PROCEDURE — 97110 THERAPEUTIC EXERCISES: CPT

## 2024-10-09 PROCEDURE — 97530 THERAPEUTIC ACTIVITIES: CPT

## 2024-10-09 PROCEDURE — 97112 NEUROMUSCULAR REEDUCATION: CPT

## 2024-10-09 NOTE — PROGRESS NOTES
"Physical Therapy Daily Treatment Note     Name: Valerie Cole  Clinic Number: [id number]    Therapy Diagnosis:   Encounter Diagnoses   Name Primary?    Decreased range of hip movement, unspecified laterality Yes    Weakness of right hip        Physician: Milagro Alexander*    Visit Date: 10/9/2024    Physician Orders: PT Eval and Treat   Medical Diagnosis from Referral: M25.50 (ICD-10-CM) - Polyarthralgia   Evaluation Date: 7/3/2024  Authorization Period Expiration: 12/31/2024  Plan of Care Expiration: 11/12/2024  Progress Note Due: 10/30/2024  Date of Surgery: N/A  Visit # / Visits authorized: 33/42  FOTO: 1/ 3    1st FOTO Follow up:   2nd FOTO Follow up:     Time In: 406pm  Time Out: 500pm  Total Billable Time: 52 minutes    Precautions: Standard    Subjective     Pt reports: that for the mast part her last week has been good. The low back has locking up a few times but it is not bad. The upper back has been good. Minimal to no hip pain.   She was compliant with home exercise program.  Response to previous treatment: no change  Functional change: ongoing    Pain: 3/10  Location: bilateral low back and R anterior hip/groin (deep in the joint)      Objective         Treatment     Valerie received therapeutic exercises to develop strength, endurance, and ROM for 12 minutes including:    Hand heel rocks, 3 x 8  Seated thoracic extension over a chair, 3 x 8  Side lying shuttle leg press, 37#, 2 x 10    Valerie received the following manual therapy techniques: Joint mobilizations were applied to the: spine and hips for 00 minutes, including:      Valerie participated in neuromuscular re-education activities to improve: Coordination, Kinesthetic, Sense, and Proprioception for 25 minutes. The following activities were included:    Triple threat birdges, 4 x 15  Quadruped bird dogs, 3 x 5 with 10" holds  Side plank + clamshells with BTB, 3 x 6 each side  Side steps with BTB + 10# press out, 10 yards x4 laps  SL " "pretzels with 3#, 2 x 10 with 5" holds (R side only)    Valerie participated in dynamic functional therapeutic activities to improve functional performance for 15 minutes, including:    Step ups on 8" step + 10# lat pull down, 2 x 10 on each side  Deadlift with 25#, 3 x 10  SL RDLs with 10#, 2 x 10 each side    Home Exercises Provided and Patient Education Provided     Education provided:   - HEP  - Plan of care  - General hypermobility and how it affects joints    Written Home Exercises Provided: Patient instructed to cont prior HEP.  Exercises were reviewed and Valerie was able to demonstrate them prior to the end of the session.  Valerie demonstrated good  understanding of the education provided.     See EMR under Patient Instructions for exercises provided  7/3/2024 .    Assessment     Valerie has maintained good symptoms control over the past week. Manual interventions were held today give her lack of pain with motion. Her exercises focused on core stabilization and hip strengthening with good tolerance.     Valerie Is progressing well towards her goals.   Pt prognosis is Good.     Pt will continue to benefit from skilled outpatient physical therapy to address the deficits listed in the problem list box on initial evaluation, provide pt/family education and to maximize pt's level of independence in the home and community environment.     Pt's spiritual, cultural and educational needs considered and pt agreeable to plan of care and goals.     Anticipated barriers to physical therapy: hypermobility    Goals:   Short Term Goals (6 Weeks):  1. Pt will be compliant with HEP to supplement PT in decreasing pain with functional mobility. (MET)  2. Pt will perform pallof press with good control to demonstrate improved core strength. (Progressing)  3. Pt will improve R hip flexion ROM by 10 degrees to improve functional mobility. (Progressing)  4. Pt will improve impaired LE strength by 1/2 MMT grade to improve " strength for functional tasks. (Progressing)  Long Term Goals (12 Weeks):   1. Pt will improve FOTO score to </= 71% to decrease perceived limitation with maintaining/changing body position. (Progressing)  2. Pt will perform RDLs with good control to demonstrate improved core strength. (Progressing)  3. Pt will improve R hip flexion ROM by 15 degrees to improve functional mobility. (Progressing)  4. Pt will improve impaired LE strength by 1 MMT grade to improve strength for functional tasks. (Progressing)    Plan     Plan of care Certification: 10/1/2024 to 11/12/2024.     Outpatient Physical Therapy 1 times weekly for 6 weeks to include the following interventions: Gait Training, Manual Therapy, Moist Heat/ Ice, Neuromuscular Re-ed, Patient Education, Self Care, Therapeutic Activities, and Therapeutic Exercise.     GEOVANNA ZARAGOZA, PT, DPT  Board Certified in Orthopedic Physical Therapy

## 2024-10-10 ENCOUNTER — OFFICE VISIT (OUTPATIENT)
Dept: CARDIOLOGY | Facility: CLINIC | Age: 29
End: 2024-10-10
Payer: COMMERCIAL

## 2024-10-10 VITALS
DIASTOLIC BLOOD PRESSURE: 82 MMHG | HEART RATE: 91 BPM | BODY MASS INDEX: 23.34 KG/M2 | SYSTOLIC BLOOD PRESSURE: 120 MMHG | WEIGHT: 162.69 LBS

## 2024-10-10 DIAGNOSIS — G90.A POTS (POSTURAL ORTHOSTATIC TACHYCARDIA SYNDROME): ICD-10-CM

## 2024-10-10 DIAGNOSIS — R00.0 TACHYCARDIA: Primary | ICD-10-CM

## 2024-10-10 PROCEDURE — 99999 PR PBB SHADOW E&M-EST. PATIENT-LVL V: CPT | Mod: PBBFAC,,, | Performed by: INTERNAL MEDICINE

## 2024-10-10 RX ORDER — METOPROLOL TARTRATE 25 MG/1
12.5 TABLET, FILM COATED ORAL 2 TIMES DAILY
Qty: 180 TABLET | Refills: 3 | Status: SHIPPED | OUTPATIENT
Start: 2024-10-10

## 2024-10-10 NOTE — PROGRESS NOTES
HISTORY:    30 yo F w a h/o hypermobility/POTS presenting for initial evaluation by me.    Seen by Dr. Faith Pederson earlier this year and diagnosed with POTS.    Notes a long h/o intermittent elevated heart rates. Heart rates go upto 160-170s with light exercise or doing certain activities. Sometimes does not have that response. Occasionally has orthostatic symptoms. Symptoms do bother her, but she has learned to live with them. Pre-syncope without a h/o syncope. Symptoms present since high school.    Has been aggressive w fluid intake, increased salt intake, electrolytes.     The patient denies any previous history of myocardial infarction, coronary artery disease, peripheral arterial disease, stroke, congestive heart failure, or cardiomyopathy.      PHYSICAL EXAM:    Vitals:    10/10/24 0821   BP: 120/82   Pulse: 91       NAD, A+Ox3.  No jvd, no bruit.  RRR nml s1,s2. No murmurs.  CTA B no wheezes or crackles.  No edema.    LABS/STUDIES (imaging reviewed during clinic visit):    2023 CBC and CMP normal.  TSH normal.  2022 /HDL 57/LDL 88/TG 46.  A1c normal.    ECG October 2024 demonstrates sinus rhythm with no Q-waves or ST changes.    Holter April 2024 demonstrates sinus rhythm no evidence of ectopy or arrhythmia.  Average heart rate 81.  Multiple episodes of palpitations, lightheadedness, dizziness, shortness of breath all corresponded with sinus rhythm.  Sometimes symptoms correspond with sinus tach. Heart rates are elevated at times as described by history.    ASSESSMENT & PLAN:    1. Tachycardia    2. POTS (postural orthostatic tachycardia syndrome)        Orders Placed This Encounter    IN OFFICE EKG 12-LEAD (to Muse)    Echo    metoprolol tartrate (LOPRESSOR) 25 MG tablet        Pt with POTS. Doing okay. Try 1 galloon water intake/day and 8-12 gm salt. Compression stockings as tolerated. Exercising as much as she can.    Trial metoprolol 12.5x2.     Follow up in about 6 months (around  4/10/2025).      Roseanne Chopra MD

## 2024-10-11 ENCOUNTER — CLINICAL SUPPORT (OUTPATIENT)
Dept: REHABILITATION | Facility: HOSPITAL | Age: 29
End: 2024-10-11
Payer: COMMERCIAL

## 2024-10-11 ENCOUNTER — PATIENT MESSAGE (OUTPATIENT)
Dept: REHABILITATION | Facility: HOSPITAL | Age: 29
End: 2024-10-11

## 2024-10-11 DIAGNOSIS — R29.898 WEAKNESS OF RIGHT HIP: ICD-10-CM

## 2024-10-11 DIAGNOSIS — M25.659 DECREASED RANGE OF HIP MOVEMENT, UNSPECIFIED LATERALITY: Primary | ICD-10-CM

## 2024-10-11 LAB
OHS QRS DURATION: 96 MS
OHS QTC CALCULATION: 440 MS

## 2024-10-11 PROCEDURE — 97110 THERAPEUTIC EXERCISES: CPT

## 2024-10-11 PROCEDURE — 97140 MANUAL THERAPY 1/> REGIONS: CPT

## 2024-10-11 NOTE — PROGRESS NOTES
"Physical Therapy Daily Treatment Note     Name: Valerie Cole  Clinic Number: [id number]    Therapy Diagnosis:   Encounter Diagnoses   Name Primary?    Decreased range of hip movement, unspecified laterality Yes    Weakness of right hip      Physician: Milagro Alexander*    Visit Date: 10/11/2024    Physician Orders: PT Eval and Treat   Medical Diagnosis from Referral: M25.50 (ICD-10-CM) - Polyarthralgia   Evaluation Date: 7/3/2024  Authorization Period Expiration: 12/31/2024  Plan of Care Expiration: 11/12/2024  Progress Note Due: 10/30/2024  Date of Surgery: N/A  Visit # / Visits authorized: 34/42  FOTO: 1/ 3    1st FOTO Follow up:   2nd FOTO Follow up:     Time In: 1002am  Time Out: 1048am  Total Billable Time: 45 minutes    Precautions: Standard    Subjective     Pt reports: that after her session earlier this week she developed R sided rib pain. It hurts to take a deep breath and it hurts when she moves her back.   She was compliant with home exercise program.  Response to previous treatment: no change  Functional change: ongoing    Pain: 3/10  Location: bilateral low back and R anterior hip/groin (deep in the joint)      Objective         Treatment     Valerie received therapeutic exercises to develop strength, endurance, and ROM for 15 minutes including:    Side lying open books, 2 x 10 each side  Seated thoracic extension over a chair, 3 x 8  Quadruped cat/camel, 3 x 10    Not today:  Hand heel rocks, 3 x 8  Side lying shuttle leg press, 37#, 2 x 10    Valerie received the following manual therapy techniques: Joint mobilizations were applied to the: spine and hips for 30 minutes, including:    Thoracic and rib mobility assessment   Breathing assessment   Supine thoracic manipulation, grade V  Prone thoracic spine PA mobs, grade IV  R 12th rib mobs, grade II  MET to improve posteriorly displaced rib on the R, 4 x 5"     Valerie participated in neuromuscular re-education activities to improve: " "Coordination, Kinesthetic, Sense, and Proprioception for 00 minutes. The following activities were included:    Not today:  Triple threat birdges, 4 x 15  Quadruped bird dogs, 3 x 5 with 10" holds  Side plank + clamshells with BTB, 3 x 6 each side  Side steps with BTB + 10# press out, 10 yards x4 laps  SL pretzels with 3#, 2 x 10 with 5" holds (R side only)    Valerie participated in dynamic functional therapeutic activities to improve functional performance for 00 minutes, including:    Not today:  Step ups on 8" step + 10# lat pull down, 2 x 10 on each side  Deadlift with 25#, 3 x 10  SL RDLs with 10#, 2 x 10 each side    Home Exercises Provided and Patient Education Provided     Education provided:   - HEP  - Plan of care  - General hypermobility and how it affects joints    Written Home Exercises Provided: Patient instructed to cont prior HEP.  Exercises were reviewed and Valerie was able to demonstrate them prior to the end of the session.  Valerie demonstrated good  understanding of the education provided.     See EMR under Patient Instructions for exercises provided  7/3/2024 .    Assessment     Valerie presents today with a sudden onset for R sided lower rib pain. Her pain began a few days ago and has not improved. Her 12th rib on the R side was very tender today so thoracic spine mobility was addressed to help offload the rib. She had trouble tolerating manual therapy targeting the ribs so METs were performed. Exercises today focused on improved thoracic spine mobility.     Valerie Is progressing well towards her goals.   Pt prognosis is Good.     Pt will continue to benefit from skilled outpatient physical therapy to address the deficits listed in the problem list box on initial evaluation, provide pt/family education and to maximize pt's level of independence in the home and community environment.     Pt's spiritual, cultural and educational needs considered and pt agreeable to plan of care and " goals.     Anticipated barriers to physical therapy: hypermobility    Goals:   Short Term Goals (6 Weeks):  1. Pt will be compliant with HEP to supplement PT in decreasing pain with functional mobility. (MET)  2. Pt will perform pallof press with good control to demonstrate improved core strength. (Progressing)  3. Pt will improve R hip flexion ROM by 10 degrees to improve functional mobility. (Progressing)  4. Pt will improve impaired LE strength by 1/2 MMT grade to improve strength for functional tasks. (Progressing)  Long Term Goals (12 Weeks):   1. Pt will improve FOTO score to </= 71% to decrease perceived limitation with maintaining/changing body position. (Progressing)  2. Pt will perform RDLs with good control to demonstrate improved core strength. (Progressing)  3. Pt will improve R hip flexion ROM by 15 degrees to improve functional mobility. (Progressing)  4. Pt will improve impaired LE strength by 1 MMT grade to improve strength for functional tasks. (Progressing)    Plan     Plan of care Certification: 10/1/2024 to 11/12/2024.     Outpatient Physical Therapy 1 times weekly for 6 weeks to include the following interventions: Gait Training, Manual Therapy, Moist Heat/ Ice, Neuromuscular Re-ed, Patient Education, Self Care, Therapeutic Activities, and Therapeutic Exercise.     GEOVANNA ZARAGOZA, PT, DPT  Board Certified in Orthopedic Physical Therapy

## 2024-10-22 ENCOUNTER — HOSPITAL ENCOUNTER (OUTPATIENT)
Dept: CARDIOLOGY | Facility: HOSPITAL | Age: 29
Discharge: HOME | End: 2024-10-22
Attending: INTERNAL MEDICINE
Payer: COMMERCIAL

## 2024-10-22 VITALS
HEIGHT: 70 IN | HEART RATE: 52 BPM | BODY MASS INDEX: 23.19 KG/M2 | SYSTOLIC BLOOD PRESSURE: 125 MMHG | DIASTOLIC BLOOD PRESSURE: 81 MMHG | WEIGHT: 162 LBS

## 2024-10-22 DIAGNOSIS — R00.0 TACHYCARDIA: ICD-10-CM

## 2024-10-22 LAB
ASCENDING AORTA: 2.43 CM
AV AREA BY CONTINUOUS VTI: 3.4 CM2
AV INDEX (PROSTH): 0.83
AV LVOT MEAN GRADIENT: 2 MMHG
AV LVOT PEAK GRADIENT: 3 MMHG
AV MEAN GRADIENT: 2.9 MMHG
AV PEAK GRADIENT: 4.8 MMHG
AV VALVE AREA BY VELOCITY RATIO: 3.4 CM²
AV VALVE AREA: 3.5 CM2
AV VELOCITY RATIO: 0.82
BSA FOR ECHO PROCEDURE: 1.9 M2
CV ECHO LV RWT: 0.36 CM
DOP CALC AO PEAK VEL: 1.1 M/S
DOP CALC AO VTI: 26.3 CM
DOP CALC LVOT AREA: 4.2 CM2
DOP CALC LVOT DIAMETER: 2.3 CM
DOP CALC LVOT PEAK VEL: 0.9 M/S
DOP CALC LVOT STROKE VOLUME: 90.9 CM3
DOP CALC RVOT AREA: 2.92 CM2
DOP CALC RVOT DIAMETER: 1.93 CM
DOP CALCLVOT PEAK VEL VTI: 21.9 CM
E WAVE DECELERATION TIME: 184.91 MS
E/A RATIO: 3.14
E/E' RATIO: 5.2 M/S
ECHO EF ESTIMATED: 65 %
ECHO LV POSTERIOR WALL: 0.8 CM (ref 0.6–1.1)
FRACTIONAL SHORTENING: 35.6 % (ref 28–44)
HR MV ECHO: 52 BPM
INTERVENTRICULAR SEPTUM: 0.6 CM (ref 0.6–1.1)
IVC DIAMETER: 1.52 CM
IVRT: 79.92 MS
LA MAJOR: 4.92 CM
LA MINOR: 4.92 CM
LA WIDTH: 3.44 CM
LEFT ATRIUM SIZE: 3.14 CM
LEFT ATRIUM VOLUME INDEX MOD: 21.2 ML/M2
LEFT ATRIUM VOLUME INDEX: 23.7 ML/M2
LEFT ATRIUM VOLUME MOD: 40.43 ML
LEFT ATRIUM VOLUME: 45.17 CM3
LEFT INTERNAL DIMENSION IN SYSTOLE: 2.9 CM (ref 2.1–4)
LEFT VENTRICLE DIASTOLIC VOLUME INDEX: 49.32 ML/M2
LEFT VENTRICLE DIASTOLIC VOLUME: 94.2 ML
LEFT VENTRICLE MASS INDEX: 50.1 G/M2
LEFT VENTRICLE SYSTOLIC VOLUME INDEX: 17.2 ML/M2
LEFT VENTRICLE SYSTOLIC VOLUME: 32.81 ML
LEFT VENTRICULAR INTERNAL DIMENSION IN DIASTOLE: 4.5 CM (ref 3.5–6)
LEFT VENTRICULAR MASS: 95.7 G
LV LATERAL E/E' RATIO: 4.33
LV SEPTAL E/E' RATIO: 6.5
MV A" WAVE DURATION": 79.92 MS
MV PEAK A VEL: 0.29 M/S
MV PEAK E VEL: 0.91 M/S
OHS CV RV/LV RATIO: 0.71 CM
OHS LV EJECTION FRACTION SIMPSONS BIPLANE MOD: 65 %
PISA TR MAX VEL: 2.21 M/S
PULM VEIN A" WAVE DURATION": 79.92 MS
PULM VEIN S/D RATIO: 0.72
PULMONIC VEIN PEAK A VELOCITY: 0.2 M/S
PV PEAK D VEL: 0.71 M/S
PV PEAK S VEL: 0.51 M/S
RA MAJOR: 4.42 CM
RA PRESSURE ESTIMATED: 3 MMHG
RA WIDTH: 3.43 CM
RIGHT ATRIAL AREA: 12.5 CM2
RIGHT VENTRICLE DIASTOLIC BASEL DIMENSION: 3.2 CM
RV TB RVSP: 5 MMHG
RV TISSUE DOPPLER FREE WALL SYSTOLIC VELOCITY 1 (APICAL 4 CHAMBER VIEW): 15.33 CM/S
SINUS: 2.62 CM
STJ: 2.32 CM
TDI LATERAL: 0.21 M/S
TDI SEPTAL: 0.14 M/S
TDI: 0.18 M/S
TR MAX PG: 20 MMHG
TRICUSPID ANNULAR PLANE SYSTOLIC EXCURSION: 2.05 CM
TV PEAK GRADIENT: 20 MMHG
TV REST PULMONARY ARTERY PRESSURE: 23 MMHG
Z-SCORE OF LEFT VENTRICULAR DIMENSION IN END DIASTOLE: -1.78
Z-SCORE OF LEFT VENTRICULAR DIMENSION IN END SYSTOLE: -1.05

## 2024-10-22 PROCEDURE — 93306 TTE W/DOPPLER COMPLETE: CPT | Mod: 26,,, | Performed by: INTERNAL MEDICINE

## 2024-10-22 PROCEDURE — 93306 TTE W/DOPPLER COMPLETE: CPT

## 2024-10-23 ENCOUNTER — CLINICAL SUPPORT (OUTPATIENT)
Dept: REHABILITATION | Facility: HOSPITAL | Age: 29
End: 2024-10-23
Payer: COMMERCIAL

## 2024-10-23 DIAGNOSIS — R29.898 WEAKNESS OF RIGHT HIP: ICD-10-CM

## 2024-10-23 DIAGNOSIS — M25.659 DECREASED RANGE OF HIP MOVEMENT, UNSPECIFIED LATERALITY: Primary | ICD-10-CM

## 2024-10-23 PROCEDURE — 97112 NEUROMUSCULAR REEDUCATION: CPT

## 2024-10-23 PROCEDURE — 97110 THERAPEUTIC EXERCISES: CPT

## 2024-10-23 PROCEDURE — 97140 MANUAL THERAPY 1/> REGIONS: CPT

## 2024-10-23 NOTE — PROGRESS NOTES
Physical Therapy Daily Treatment Note     Name: Valerie Cole  Clinic Number: [id number]    Therapy Diagnosis:   Encounter Diagnoses   Name Primary?    Decreased range of hip movement, unspecified laterality Yes    Weakness of right hip        Physician: Milagro Alexander*    Visit Date: 10/23/2024    Physician Orders: PT Eval and Treat   Medical Diagnosis from Referral: M25.50 (ICD-10-CM) - Polyarthralgia   Evaluation Date: 7/3/2024  Authorization Period Expiration: 12/31/2024  Plan of Care Expiration: 11/12/2024  Progress Note Due: 10/30/2024  Date of Surgery: N/A  Visit # / Visits authorized: 35/42  FOTO: 1/ 3    1st FOTO Follow up:   2nd FOTO Follow up:     Time In: 408pm  Time Out: 458pm  Total Billable Time: 50 minutes    Precautions: Standard    Subjective     Pt reports: that her rib pain has been doing much better. 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 Overall she is doing better than last week.   She was compliant with home exercise program.  Response to previous treatment: no change  Functional change: ongoing    Pain: 3/10  Location: bilateral low back and R anterior hip/groin (deep in the joint)      Objective         Treatment     Valerie received therapeutic exercises to develop strength, endurance, and ROM for 25 minutes including:    Side lying open books, 2 x 10 each side  Seated thoracic extension over a chair, 3 x 8  Quadruped cat/camel, 3 x 10  Hand heel rocks, 3 x 8  B shoulder ER with BTB, 3 x 8    Not today:  Side lying shuttle leg press, 37#, 2 x 10    Valerie received the following manual therapy techniques: Joint mobilizations were applied to the: spine and hips for 13 minutes, including:    Thoracic and rib mobility assessment   Supine thoracic manipulation, grade V  Prone thoracic spine PA mobs, grade IV  R 12th rib mobs, grade II  Side lying R TL junction gapping,  "grade V    Valerie participated in neuromuscular re-education activities to improve: Coordination, Kinesthetic, Sense, and Proprioception for 12 minutes. The following activities were included:    Serratus wall slides, 3 x 8  Prone T, 3 x 8  Prone Y, 3 x 8    Not today:  Triple threat birdges, 4 x 15  Quadruped bird dogs, 3 x 5 with 10" holds  Side plank + clamshells with BTB, 3 x 6 each side  Side steps with BTB + 10# press out, 10 yards x4 laps  SL pretzels with 3#, 2 x 10 with 5" holds (R side only)    Valerie participated in dynamic functional therapeutic activities to improve functional performance for 00 minutes, including:    Not today:  Step ups on 8" step + 10# lat pull down, 2 x 10 on each side  Deadlift with 25#, 3 x 10  SL RDLs with 10#, 2 x 10 each side    Home Exercises Provided and Patient Education Provided     Education provided:   - HEP  - Plan of care  - General hypermobility and how it affects joints    Written Home Exercises Provided: Patient instructed to cont prior HEP.  Exercises were reviewed and Valerie was able to demonstrate them prior to the end of the session.  Valerie demonstrated good  understanding of the education provided.     See EMR under Patient Instructions for exercises provided  7/3/2024 .    Assessment     Valerie presents today with improved reports of R sided TL junction and lower rib pain compared to her session last week. She does remains with pain with thoracic spine rotation and side bending and this improved with manual interventions. Periscapular loading was initiated today to help improve stability of the TL junction and lower thoracic spine segments.     Valerie Is progressing well towards her goals.   Pt prognosis is Good.     Pt will continue to benefit from skilled outpatient physical therapy to address the deficits listed in the problem list box on initial evaluation, provide pt/family education and to maximize pt's level of independence in the home and " community environment.     Pt's spiritual, cultural and educational needs considered and pt agreeable to plan of care and goals.     Anticipated barriers to physical therapy: hypermobility    Goals:   Short Term Goals (6 Weeks):  1. Pt will be compliant with HEP to supplement PT in decreasing pain with functional mobility. (MET)  2. Pt will perform pallof press with good control to demonstrate improved core strength. (Progressing)  3. Pt will improve R hip flexion ROM by 10 degrees to improve functional mobility. (Progressing)  4. Pt will improve impaired LE strength by 1/2 MMT grade to improve strength for functional tasks. (Progressing)  Long Term Goals (12 Weeks):   1. Pt will improve FOTO score to </= 71% to decrease perceived limitation with maintaining/changing body position. (Progressing)  2. Pt will perform RDLs with good control to demonstrate improved core strength. (Progressing)  3. Pt will improve R hip flexion ROM by 15 degrees to improve functional mobility. (Progressing)  4. Pt will improve impaired LE strength by 1 MMT grade to improve strength for functional tasks. (Progressing)    Plan     Plan of care Certification: 10/1/2024 to 11/12/2024.     Outpatient Physical Therapy 1 times weekly for 6 weeks to include the following interventions: Gait Training, Manual Therapy, Moist Heat/ Ice, Neuromuscular Re-ed, Patient Education, Self Care, Therapeutic Activities, and Therapeutic Exercise.     GEOVANNA ZARAGOZA, PT, DPT  Board Certified in Orthopedic Physical Therapy

## 2024-10-30 ENCOUNTER — PATIENT MESSAGE (OUTPATIENT)
Dept: REHABILITATION | Facility: HOSPITAL | Age: 29
End: 2024-10-30
Payer: COMMERCIAL

## 2024-11-04 DIAGNOSIS — D84.9 IMMUNE DEFICIENCY DISORDER: ICD-10-CM

## 2024-11-04 DIAGNOSIS — R09.89 CHRONIC SINUS COMPLAINTS: ICD-10-CM

## 2024-11-04 RX ORDER — AZITHROMYCIN 500 MG/1
TABLET, FILM COATED ORAL
Qty: 3 TABLET | Refills: 3 | Status: SHIPPED | OUTPATIENT
Start: 2024-11-04

## 2024-11-13 ENCOUNTER — PATIENT MESSAGE (OUTPATIENT)
Dept: REHABILITATION | Facility: HOSPITAL | Age: 29
End: 2024-11-13
Payer: COMMERCIAL

## 2024-11-13 ENCOUNTER — PATIENT MESSAGE (OUTPATIENT)
Dept: CARDIOLOGY | Facility: CLINIC | Age: 29
End: 2024-11-13
Payer: COMMERCIAL

## 2024-11-20 ENCOUNTER — CLINICAL SUPPORT (OUTPATIENT)
Dept: REHABILITATION | Facility: HOSPITAL | Age: 29
End: 2024-11-20
Payer: COMMERCIAL

## 2024-11-20 DIAGNOSIS — M25.659 DECREASED RANGE OF HIP MOVEMENT, UNSPECIFIED LATERALITY: Primary | ICD-10-CM

## 2024-11-20 DIAGNOSIS — R29.898 WEAKNESS OF RIGHT HIP: ICD-10-CM

## 2024-11-20 PROCEDURE — 97112 NEUROMUSCULAR REEDUCATION: CPT

## 2024-11-20 PROCEDURE — 97110 THERAPEUTIC EXERCISES: CPT

## 2024-11-20 PROCEDURE — 97530 THERAPEUTIC ACTIVITIES: CPT

## 2024-11-20 NOTE — PROGRESS NOTES
Physical Therapy Daily Treatment Note / Updated Plan of Care Note     Name: Valerie Cole  Clinic Number: [id number]    Therapy Diagnosis:   Encounter Diagnoses   Name Primary?    Decreased range of hip movement, unspecified laterality Yes    Weakness of right hip        Physician: Pedro Pablo Callejas MD    Visit Date: 11/20/2024    Physician Orders: PT Eval and Treat   Medical Diagnosis from Referral: M25.50 (ICD-10-CM) - Polyarthralgia   Evaluation Date: 7/3/2024  Authorization Period Expiration: 12/31/2024  Plan of Care Expiration: 12/20/2024  Progress Note Due: 12/20/2024  Date of Surgery: N/A  Visit # / Visits authorized: 36/42  FOTO: 1/ 3    1st FOTO Follow up:   2nd FOTO Follow up:     Time In: 403pm  Time Out: 500pm  Total Billable Time: 53 minutes    Precautions: Standard    Subjective     Pt reports: that she has been sick and out of town for the past few weeks so she was unable to come to PT.   She was compliant with home exercise program.  Response to previous treatment: no change  Functional change: ongoing    Pain: 3/10  Location: bilateral low back and R anterior hip/groin (deep in the joint)      Objective     LUMBAR SPINE AROM:   Flexion: 100%   Extension: 100% (hinge in lower lumbar spine, left low back pain)   Left Sidebend: 75%    Right Sidebend: 75%       SEGMENTAL MOBILITY: there are no restrictions in her segmental mobility at this time     LOWER EXTREMITY PROM    Left Right      Hip flex 120 110   Hip ext 10 10    Hip IR 35 30   Hip ER 45 45    Hip Abd 45 45      LOWER EXTREMITY STRENGTH:    Left Right      PGM 3/5 3/5   Hip IR 4/5 4/5   Hip ER 3+/5 3+/5   Hip Ext 3+/5 3+/5        Treatment     Valerie received therapeutic exercises to develop strength, endurance, and ROM for 15 minutes including:    Assessment as above  Hand heel rocks, 3 x 8  Side lying shuttle leg press, 37#, 2 x 10    Not today:  Side lying open books, 2 x 10 each side  Seated thoracic extension over a chair, 3 x  "8  Quadruped cat/camel, 3 x 10  B shoulder ER with BTB, 3 x 8    Valerie received the following manual therapy techniques: Joint mobilizations were applied to the: spine and hips for 00 minutes, including:      Valerie participated in neuromuscular re-education activities to improve: Coordination, Kinesthetic, Sense, and Proprioception for 25 minutes. The following activities were included:    SL pretzels with 3#, 2 x 10 with 5" holds each side  SL bridges, 2 x 10 on each side  Quadruped bird dogs, 3 x 5 with 10" holds  Side plank + clamshells with BTB, 3 x 6 each side  Paloff press + lift with 15#, 2 x 10 each side  Side steps with BTB + 10# press out, 10 yards x4 laps    Not today:  Serratus wall slides, 3 x 8  Prone T, 3 x 8  Prone Y, 3 x 8    Valerie participated in dynamic functional therapeutic activities to improve functional performance for 13 minutes, including:    Step ups on 8" step + 10# lat pull down, 2 x 10 on each side  Deadlift with 25#, 3 x 10  SL RDLs with 10#, 2 x 10 each side    Home Exercises Provided and Patient Education Provided     Education provided:   - HEP  - Plan of care  - General hypermobility and how it affects joints    Written Home Exercises Provided: Patient instructed to cont prior HEP.  Exercises were reviewed and Valerie was able to demonstrate them prior to the end of the session.  Valerie demonstrated good  understanding of the education provided.     See EMR under Patient Instructions for exercises provided  7/3/2024 .    Assessment     Valerie has been treated for 15 visits over the past 25 weeks to address her complaints of R hip and low back pain. Since the onset of treatment her R hip pain has been well controlled, though she does still remains with complaints of mechanical symptoms. Her low back pain has been a bit more flared up over the past few weeks and this has become more of a focus recently. Her hip ROM has improve and become less painful recently but hip " weakness remains. Valerie remains a great candidate for skilled PT services at this time for the next few weeks to develop of an extensive HEP for management in the future.     Valerie Is progressing well towards her goals.   Pt prognosis is Good.     Pt will continue to benefit from skilled outpatient physical therapy to address the deficits listed in the problem list box on initial evaluation, provide pt/family education and to maximize pt's level of independence in the home and community environment.     Pt's spiritual, cultural and educational needs considered and pt agreeable to plan of care and goals.     Anticipated barriers to physical therapy: hypermobility    Goals:   Short Term Goals (6 Weeks):  1. Pt will be compliant with HEP to supplement PT in decreasing pain with functional mobility. (MET)  2. Pt will perform pallof press with good control to demonstrate improved core strength. (Progressing)  3. Pt will improve R hip flexion ROM by 10 degrees to improve functional mobility. (Progressing)  4. Pt will improve impaired LE strength by 1/2 MMT grade to improve strength for functional tasks. (Progressing)  Long Term Goals (12 Weeks):   1. Pt will improve FOTO score to </= 71% to decrease perceived limitation with maintaining/changing body position. (Progressing)  2. Pt will perform RDLs with good control to demonstrate improved core strength. (Progressing)  3. Pt will improve R hip flexion ROM by 15 degrees to improve functional mobility. (Progressing)  4. Pt will improve impaired LE strength by 1 MMT grade to improve strength for functional tasks. (Progressing)    Plan     Plan of care Certification: 11/20/2024 to 12/20/2024.     Outpatient Physical Therapy 1 times weekly for 4 weeks to include the following interventions: Gait Training, Manual Therapy, Moist Heat/ Ice, Neuromuscular Re-ed, Patient Education, Self Care, Therapeutic Activities, and Therapeutic Exercise.     GEOVANNA ZARAGOZA, PT, DPT  Board  Certified in Orthopedic Physical Therapy

## 2024-11-20 NOTE — PLAN OF CARE
Physical Therapy Daily Treatment Note / Updated Plan of Care Note     Name: Valerie Cole  Clinic Number: [id number]    Therapy Diagnosis:   Encounter Diagnoses   Name Primary?    Decreased range of hip movement, unspecified laterality Yes    Weakness of right hip        Physician: Pedro Pablo Callejas MD    Visit Date: 11/20/2024    Physician Orders: PT Eval and Treat   Medical Diagnosis from Referral: M25.50 (ICD-10-CM) - Polyarthralgia   Evaluation Date: 7/3/2024  Authorization Period Expiration: 12/31/2024  Plan of Care Expiration: 12/20/2024  Progress Note Due: 12/20/2024  Date of Surgery: N/A  Visit # / Visits authorized: 36/42  FOTO: 1/ 3    1st FOTO Follow up:   2nd FOTO Follow up:     Time In: 403pm  Time Out: 500pm  Total Billable Time: 53 minutes    Precautions: Standard    Subjective     Pt reports: that she has been sick and out of town for the past few weeks so she was unable to come to PT.   She was compliant with home exercise program.  Response to previous treatment: no change  Functional change: ongoing    Pain: 3/10  Location: bilateral low back and R anterior hip/groin (deep in the joint)      Objective     LUMBAR SPINE AROM:   Flexion: 100%   Extension: 100% (hinge in lower lumbar spine, left low back pain)   Left Sidebend: 75%    Right Sidebend: 75%       SEGMENTAL MOBILITY: there are no restrictions in her segmental mobility at this time     LOWER EXTREMITY PROM    Left Right      Hip flex 120 110   Hip ext 10 10    Hip IR 35 30   Hip ER 45 45    Hip Abd 45 45      LOWER EXTREMITY STRENGTH:    Left Right      PGM 3/5 3/5   Hip IR 4/5 4/5   Hip ER 3+/5 3+/5   Hip Ext 3+/5 3+/5        Treatment     Valerie received therapeutic exercises to develop strength, endurance, and ROM for 15 minutes including:    Assessment as above  Hand heel rocks, 3 x 8  Side lying shuttle leg press, 37#, 2 x 10    Not today:  Side lying open books, 2 x 10 each side  Seated thoracic extension over a chair, 3 x  "8  Quadruped cat/camel, 3 x 10  B shoulder ER with BTB, 3 x 8    Valerie received the following manual therapy techniques: Joint mobilizations were applied to the: spine and hips for 00 minutes, including:      Valerie participated in neuromuscular re-education activities to improve: Coordination, Kinesthetic, Sense, and Proprioception for 25 minutes. The following activities were included:    SL pretzels with 3#, 2 x 10 with 5" holds each side  SL bridges, 2 x 10 on each side  Quadruped bird dogs, 3 x 5 with 10" holds  Side plank + clamshells with BTB, 3 x 6 each side  Paloff press + lift with 15#, 2 x 10 each side  Side steps with BTB + 10# press out, 10 yards x4 laps    Not today:  Serratus wall slides, 3 x 8  Prone T, 3 x 8  Prone Y, 3 x 8    Valerie participated in dynamic functional therapeutic activities to improve functional performance for 13 minutes, including:    Step ups on 8" step + 10# lat pull down, 2 x 10 on each side  Deadlift with 25#, 3 x 10  SL RDLs with 10#, 2 x 10 each side    Home Exercises Provided and Patient Education Provided     Education provided:   - HEP  - Plan of care  - General hypermobility and how it affects joints    Written Home Exercises Provided: Patient instructed to cont prior HEP.  Exercises were reviewed and Valerie was able to demonstrate them prior to the end of the session.  Valerie demonstrated good  understanding of the education provided.     See EMR under Patient Instructions for exercises provided 7/3/2024.    Assessment     Valerie has been treated for 15 visits over the past 25 weeks to address her complaints of R hip and low back pain. Since the onset of treatment her R hip pain has been well controlled, though she does still remains with complaints of mechanical symptoms. Her low back pain has been a bit more flared up over the past few weeks and this has become more of a focus recently. Her hip ROM has improve and become less painful recently but hip " weakness remains. Valerie remains a great candidate for skilled PT services at this time for the next few weeks to develop of an extensive HEP for management in the future.     Valerie Is progressing well towards her goals.   Pt prognosis is Good.     Pt will continue to benefit from skilled outpatient physical therapy to address the deficits listed in the problem list box on initial evaluation, provide pt/family education and to maximize pt's level of independence in the home and community environment.     Pt's spiritual, cultural and educational needs considered and pt agreeable to plan of care and goals.     Anticipated barriers to physical therapy: hypermobility    Goals:   Short Term Goals (6 Weeks):  1. Pt will be compliant with HEP to supplement PT in decreasing pain with functional mobility. (MET)  2. Pt will perform pallof press with good control to demonstrate improved core strength. (Progressing)  3. Pt will improve R hip flexion ROM by 10 degrees to improve functional mobility. (Progressing)  4. Pt will improve impaired LE strength by 1/2 MMT grade to improve strength for functional tasks. (Progressing)  Long Term Goals (12 Weeks):   1. Pt will improve FOTO score to </= 71% to decrease perceived limitation with maintaining/changing body position. (Progressing)  2. Pt will perform RDLs with good control to demonstrate improved core strength. (Progressing)  3. Pt will improve R hip flexion ROM by 15 degrees to improve functional mobility. (Progressing)  4. Pt will improve impaired LE strength by 1 MMT grade to improve strength for functional tasks. (Progressing)    Plan     Plan of care Certification: 11/20/2024 to 12/20/2024.     Outpatient Physical Therapy 1 times weekly for 4 weeks to include the following interventions: Gait Training, Manual Therapy, Moist Heat/ Ice, Neuromuscular Re-ed, Patient Education, Self Care, Therapeutic Activities, and Therapeutic Exercise.     GEOVANNA ZARAGOZA, PT, DPT  Board  Certified in Orthopedic Physical Therapy

## 2024-11-26 NOTE — PROGRESS NOTES
"Physical Therapy Daily Treatment Note     Name: Valerie Cole  Clinic Number: [id number]    Therapy Diagnosis:   Encounter Diagnoses   Name Primary?    Decreased range of hip movement, unspecified laterality Yes    Weakness of right hip      Physician: Pedro Pablo Callejas MD    Visit Date: 11/27/2024    Physician Orders: PT Eval and Treat   Medical Diagnosis from Referral: M25.50 (ICD-10-CM) - Polyarthralgia   Evaluation Date: 7/3/2024  Authorization Period Expiration: 12/31/2024  Plan of Care Expiration: 12/20/2024  Progress Note Due: 12/20/2024  Date of Surgery: N/A  Visit # / Visits authorized: 36/42  FOTO: 1/ 3    1st FOTO Follow up:   2nd FOTO Follow up:     Time In: 800am  Time Out: 900am  Total Billable Time: 60 minutes    Precautions: Standard    Subjective     Pt reports: that there has been no significant pain recently. She still deals with some of the low back locking but it has not been bad. Her L knee has bothered her a few times this week.   She was compliant with home exercise program.  Response to previous treatment: no change  Functional change: ongoing    Pain: 3/10  Location: bilateral low back and R anterior hip/groin (deep in the joint)      Objective       Treatment     Valerie received therapeutic exercises to develop strength, endurance, and ROM for 10 minutes including:    Hand heel rocks, 3 x 8  Side lying shuttle leg press, 37#, 2 x 10    Valerie received the following manual therapy techniques: Joint mobilizations were applied to the: spine and hips for 10 minutes, including:    R hip long axis distraction, grade III and IV  Inferior hip mobs+ flexion, grade IV  Lateral hip mobs, grade III    Valerie participated in neuromuscular re-education activities to improve: Coordination, Kinesthetic, Sense, and Proprioception for 25 minutes. The following activities were included:    SL pretzels with 3#, 2 x 10 with 5" holds each side  SL bridges on BOSU ball, 2 x 10 on each side  Quadruped " "bird dogs, 3 x 5 with 10" holds  Side plank + clamshells with BTB, 3 x 6 each side  Paloff press + lift with 15#, 2 x 10 each side  Side steps with BTB + 10# press out, 10 yards x4 laps    Not today:  Serratus wall slides, 3 x 8  Prone T, 3 x 8  Prone Y, 3 x 8    Valerie participated in dynamic functional therapeutic activities to improve functional performance for 15 minutes, including:    Step ups on 8" step + 10# lat pull down, 2 x 10 on each side  Deadlift with 25#, 3 x 10  SL RDLs with 10#, 2 x 10 each side    Home Exercises Provided and Patient Education Provided     Education provided:   - HEP  - Plan of care  - General hypermobility and how it affects joints    Written Home Exercises Provided: Patient instructed to cont prior HEP.  Exercises were reviewed and Valerie was able to demonstrate them prior to the end of the session.  Valerie demonstrated good  understanding of the education provided.     See EMR under Patient Instructions for exercises provided  7/3/2024 .    Assessment     Valerie presents with no symptoms at this time. Her R hip mobility was painful prior to her end range so mobilizations were performed for pain control purposes and she responded well to this. Hip and core strengthening continues to be the focus of her sessions at this time to improve joint stability. She was progressed with loading exercises today with good tolerance.     Valerie Is progressing well towards her goals.   Pt prognosis is Good.     Pt will continue to benefit from skilled outpatient physical therapy to address the deficits listed in the problem list box on initial evaluation, provide pt/family education and to maximize pt's level of independence in the home and community environment.     Pt's spiritual, cultural and educational needs considered and pt agreeable to plan of care and goals.     Anticipated barriers to physical therapy: hypermobility    Goals:   Short Term Goals (6 Weeks):  1. Pt will be " compliant with HEP to supplement PT in decreasing pain with functional mobility. (MET)  2. Pt will perform pallof press with good control to demonstrate improved core strength. (Progressing)  3. Pt will improve R hip flexion ROM by 10 degrees to improve functional mobility. (Progressing)  4. Pt will improve impaired LE strength by 1/2 MMT grade to improve strength for functional tasks. (Progressing)  Long Term Goals (12 Weeks):   1. Pt will improve FOTO score to </= 71% to decrease perceived limitation with maintaining/changing body position. (Progressing)  2. Pt will perform RDLs with good control to demonstrate improved core strength. (Progressing)  3. Pt will improve R hip flexion ROM by 15 degrees to improve functional mobility. (Progressing)  4. Pt will improve impaired LE strength by 1 MMT grade to improve strength for functional tasks. (Progressing)    Plan     Plan of care Certification: 11/20/2024 to 12/20/2024.     Outpatient Physical Therapy 1 times weekly for 4 weeks to include the following interventions: Gait Training, Manual Therapy, Moist Heat/ Ice, Neuromuscular Re-ed, Patient Education, Self Care, Therapeutic Activities, and Therapeutic Exercise.     GEOVANNA ZARAGOZA, PT, DPT  Board Certified in Orthopedic Physical Therapy

## 2024-11-27 ENCOUNTER — CLINICAL SUPPORT (OUTPATIENT)
Dept: REHABILITATION | Facility: HOSPITAL | Age: 29
End: 2024-11-27
Payer: COMMERCIAL

## 2024-11-27 DIAGNOSIS — M25.659 DECREASED RANGE OF HIP MOVEMENT, UNSPECIFIED LATERALITY: Primary | ICD-10-CM

## 2024-11-27 DIAGNOSIS — R29.898 WEAKNESS OF RIGHT HIP: ICD-10-CM

## 2024-11-27 PROCEDURE — 97112 NEUROMUSCULAR REEDUCATION: CPT

## 2024-11-27 PROCEDURE — 97140 MANUAL THERAPY 1/> REGIONS: CPT

## 2024-11-27 PROCEDURE — 97110 THERAPEUTIC EXERCISES: CPT

## 2024-11-27 PROCEDURE — 97530 THERAPEUTIC ACTIVITIES: CPT

## 2024-12-04 ENCOUNTER — CLINICAL SUPPORT (OUTPATIENT)
Dept: REHABILITATION | Facility: HOSPITAL | Age: 29
End: 2024-12-04
Payer: COMMERCIAL

## 2024-12-04 DIAGNOSIS — M25.659 DECREASED RANGE OF HIP MOVEMENT, UNSPECIFIED LATERALITY: Primary | ICD-10-CM

## 2024-12-04 DIAGNOSIS — R29.898 WEAKNESS OF RIGHT HIP: ICD-10-CM

## 2024-12-04 PROCEDURE — 97530 THERAPEUTIC ACTIVITIES: CPT

## 2024-12-04 PROCEDURE — 97110 THERAPEUTIC EXERCISES: CPT

## 2024-12-04 PROCEDURE — 97112 NEUROMUSCULAR REEDUCATION: CPT

## 2024-12-04 NOTE — PROGRESS NOTES
Physical Therapy Daily Treatment Note / Discharge Note     Name: Valerie Cole  Clinic Number: [id number]    Therapy Diagnosis:   Encounter Diagnoses   Name Primary?    Decreased range of hip movement, unspecified laterality Yes    Weakness of right hip        Physician: Pedro Pablo Callejas MD    Visit Date: 12/4/2024    Physician Orders: PT Eval and Treat   Medical Diagnosis from Referral: M25.50 (ICD-10-CM) - Polyarthralgia   Evaluation Date: 7/3/2024  Authorization Period Expiration: 12/31/2024  Plan of Care Expiration: 12/20/2024  Progress Note Due: 12/20/2024  Date of Surgery: N/A  Visit # / Visits authorized: 37/42  FOTO: 1/ 3    1st FOTO Follow up:   2nd FOTO Follow up:     Time In: 408pm  Time Out: 500pm  Total Billable Time: 50 minutes    Precautions: Standard    Subjective     Pt reports: that she remains about the same. She did have a fall that hurt her L shoulder a bit but it is better. She gets an occasional mod back pain when she lifts something too heavy but it only lasts for an instant.   She was compliant with home exercise program.  Response to previous treatment: no change  Functional change: ongoing    Pain: 3/10  Location: bilateral low back and R anterior hip/groin (deep in the joint)      Objective     LUMBAR SPINE AROM:   Flexion: 100%   Extension: 100% (hinge in lower lumbar spine, left low back pain)   Left Sidebend: 75%    Right Sidebend: 75%       SEGMENTAL MOBILITY: there are no restrictions in her segmental mobility at this time     LOWER EXTREMITY PROM    Left Right      Hip flex 120 110   Hip ext 10 10    Hip IR 35 30   Hip ER 45 45    Hip Abd 45 45      LOWER EXTREMITY STRENGTH:    Left Right      PGM 3/5 3/5   Hip IR 4/5 4/5   Hip ER 3+/5 3+/5   Hip Ext 3+/5 3+/5        Treatment     Valerie received therapeutic exercises to develop strength, endurance, and ROM for 10 minutes including:    Assessment as above  Hand heel rocks, 3 x 8  Side lying shuttle leg press, 37#, 2 x  "10    Valerie received the following manual therapy techniques: Joint mobilizations were applied to the: spine and hips for 0 minutes, including:      Valerie participated in neuromuscular re-education activities to improve: Coordination, Kinesthetic, Sense, and Proprioception for 25 minutes. The following activities were included:    SL pretzels with 3#, 2 x 10 with 5" holds each side  SL bridges on BOSU ball, 2 x 10 on each side  Quadruped bird dogs, 3 x 5 with 10" holds  Side plank + clamshells with BTB, 3 x 6 each side  Paloff press + lift with 15#, 2 x 10 each side  Side steps with BTB + 10# press out, 10 yards x4 laps      Valerie participated in dynamic functional therapeutic activities to improve functional performance for 15 minutes, including:    Step ups on 8" step + 10# lat pull down, 2 x 10 on each side  Deadlift with 25#, 3 x 10  SL RDLs with 10#, 2 x 10 each side    Home Exercises Provided and Patient Education Provided     Education provided:   - HEP  - Plan of care  - General hypermobility and how it affects joints    Written Home Exercises Provided: Patient instructed to cont prior HEP.  Exercises were reviewed and Valerie was able to demonstrate them prior to the end of the session.  Valerie demonstrated good  understanding of the education provided.     See EMR under Patient Instructions for exercises provided  7/3/2024 .    Assessment     Valerie has been treated for 17 visits over the past 27 weeks to address her complaints of R hip and low back pain. Since the onset of treatment her R hip pain has been well controlled, though she does still remains with complaints of mechanical symptoms.. Her hip ROM has improve and become less painful recently but hip weakness remains. Her low back pain has been stable recently. At this time Valerie will be discharged from skilled PT services with an extensive HEP in order to allow he to manage her pain for the long term moving forward.     Valerie Is " progressing well towards her goals.   Pt prognosis is Good.     Pt will continue to benefit from skilled outpatient physical therapy to address the deficits listed in the problem list box on initial evaluation, provide pt/family education and to maximize pt's level of independence in the home and community environment.     Pt's spiritual, cultural and educational needs considered and pt agreeable to plan of care and goals.     Anticipated barriers to physical therapy: hypermobility    Goals:   Short Term Goals (6 Weeks):  1. Pt will be compliant with HEP to supplement PT in decreasing pain with functional mobility. (MET)  2. Pt will perform pallof press with good control to demonstrate improved core strength. (MET)  3. Pt will improve R hip flexion ROM by 10 degrees to improve functional mobility. (MET)  4. Pt will improve impaired LE strength by 1/2 MMT grade to improve strength for functional tasks. (MET)  Long Term Goals (12 Weeks):   1. Pt will improve FOTO score to </= 71% to decrease perceived limitation with maintaining/changing body position. (Not fully assessed)  2. Pt will perform RDLs with good control to demonstrate improved core strength. (MET)  3. Pt will improve R hip flexion ROM by 15 degrees to improve functional mobility. (Not met)  4. Pt will improve impaired LE strength by 1 MMT grade to improve strength for functional tasks. (MET)    Plan     Discharge from skilled PT services.     GEOVANNA ZARAGOZA, PT, DPT  Board Certified in Orthopedic Physical Therapy

## 2024-12-12 DIAGNOSIS — R09.89 CHRONIC SINUS COMPLAINTS: ICD-10-CM

## 2024-12-12 DIAGNOSIS — J45.20 MILD INTERMITTENT ASTHMA WITHOUT COMPLICATION: ICD-10-CM

## 2024-12-16 RX ORDER — MONTELUKAST SODIUM 10 MG/1
10 TABLET ORAL NIGHTLY
Qty: 30 TABLET | Refills: 11 | Status: SHIPPED | OUTPATIENT
Start: 2024-12-16

## 2024-12-18 ENCOUNTER — OFFICE VISIT (OUTPATIENT)
Dept: NEUROLOGY | Facility: CLINIC | Age: 29
End: 2024-12-18
Payer: COMMERCIAL

## 2024-12-18 VITALS
HEIGHT: 70 IN | DIASTOLIC BLOOD PRESSURE: 70 MMHG | HEART RATE: 66 BPM | WEIGHT: 160.38 LBS | RESPIRATION RATE: 17 BRPM | SYSTOLIC BLOOD PRESSURE: 104 MMHG | BODY MASS INDEX: 22.96 KG/M2

## 2024-12-18 DIAGNOSIS — G43.909 EPISODIC MIGRAINE: Primary | ICD-10-CM

## 2024-12-18 PROCEDURE — 3078F DIAST BP <80 MM HG: CPT | Mod: CPTII,S$GLB,, | Performed by: PHYSICIAN ASSISTANT

## 2024-12-18 PROCEDURE — 1159F MED LIST DOCD IN RCRD: CPT | Mod: CPTII,S$GLB,, | Performed by: PHYSICIAN ASSISTANT

## 2024-12-18 PROCEDURE — 99214 OFFICE O/P EST MOD 30 MIN: CPT | Mod: S$GLB,,, | Performed by: PHYSICIAN ASSISTANT

## 2024-12-18 PROCEDURE — 99999 PR PBB SHADOW E&M-EST. PATIENT-LVL III: CPT | Mod: PBBFAC,,, | Performed by: PHYSICIAN ASSISTANT

## 2024-12-18 PROCEDURE — 1160F RVW MEDS BY RX/DR IN RCRD: CPT | Mod: CPTII,S$GLB,, | Performed by: PHYSICIAN ASSISTANT

## 2024-12-18 PROCEDURE — 3008F BODY MASS INDEX DOCD: CPT | Mod: CPTII,S$GLB,, | Performed by: PHYSICIAN ASSISTANT

## 2024-12-18 PROCEDURE — 3074F SYST BP LT 130 MM HG: CPT | Mod: CPTII,S$GLB,, | Performed by: PHYSICIAN ASSISTANT

## 2024-12-18 RX ORDER — PREDNISONE 20 MG/1
TABLET ORAL
Qty: 12 TABLET | Refills: 0 | Status: SHIPPED | OUTPATIENT
Start: 2024-12-18

## 2024-12-18 RX ORDER — NORTRIPTYLINE HYDROCHLORIDE 25 MG/1
25 CAPSULE ORAL NIGHTLY
Qty: 30 CAPSULE | Refills: 11 | Status: SHIPPED | OUTPATIENT
Start: 2024-12-18 | End: 2025-12-18

## 2024-12-18 NOTE — PROGRESS NOTES
[facility]  [address] [facility] [address]  [place] [address]  Phone:[phone]  Fax: [phone]  Follow up visit     Patient Name: Valerie Cole  : 1995  MRN:  [id number]  Today: 2024   LOV: 2024  chief complaint: Headache  Approx 34 mins with patient including charting   PCP: Christoph Watson MD.       Assessment:   Valerie Cole is a 29 y.o. right handed female with a PMHx of: asthma, GERD, and nephrolithiasis   whom presents solo in follow up. HA appearing to be migrainous, worse in past week d/t stress/anxiety/missing meals. Notes some brain fog (though able to give examples of what she can't remember). Would like to make some medication changes. We discussed family planning and suggested letting us know when she is ready to start her family (suggest being off medications, let us know so we can help taper off). We can consider nerve blocks at that time.     Review:    ICD-10-CM ICD-9-CM   1. Episodic migraine  G43.909 346.90              Plan:        For HA Prevention:  1 no topamax d/t PMHx of nephrolithiasis, no BB d/t PMHx of asthma   2 she would like to come off elavil, we discussed switching to pamelor or trying cGRP (we discussed options given her hx of constipation), she would like to switch to pamelor. Discussed adv effects/dosing, switch from 25 mg elavil Q2h before bed to pamelor 25 mg Q2h before bed.        For HA :  Ubrelvy   Zofran      To break up Headaches:  Offered celebrex taper vs another deltasone course. Chose the latter.   Course of deltasone to start tomorrow, discussed taper schedule , take on full stomach at breakfast time only, take until completion and discussed side effects. The patient agreed.            Other:  N/A         All test results as well as any necessary instructions were reviewed and discussed with patient.    Review:  Orders Placed This Encounter    predniSONE (DELTASONE) 20 MG tablet     "nortriptyline (PAMELOR) 25 MG capsule               Patient to return to PCP/other specialists for all other problems  Patient to continue on all medications as Rx'd  Full office note available online   RTO-6 months to check in   The patient indicates understanding of these issues and agrees to the plan.    HPI:   Valerie Cole is a 29 y.o.right handed, female with a PMHx of: asthma, GERD, and nephrolithiasis   whom presents solo in follow up for HA.    At last visit (5/2024): elavil at 25 mg, ubrelvy and zofran.   Two weeks of HA, attributes to stress/missing meds/dehydration (spouse has been sick)   Pain along frontalis/occipitalis  Notices pain is all day, but worsening pain in evening  Ubrelvy "takes edge off" but doesn't take away the HA   No steroid use recently, no GIB at present   Having brain fog, STM changes more noticeable, feels it may be d/t to the elavil (has been sleeping better and appreciates that she has an appetite)         -no trouble with ADLS (e.g., dressing, eating, driving, bank account management)  Has HA in office       From previous visit: ellavil at 25 mg and ubrelvy.   2-3 migraine days a month, dehydration can be a trigger and maybe a small HA (1-2 days) out of a month   Intensity come down with HA   Ubrelvy is abortive, no side effects  No side effects from elavil but feels a bit more sluggish in morning, and at times feels mental acuity is decreased  Pain is retrorbital (OD>OS), or can be along frontalis   Last HA: 1 week ago   She would like a refill of zofran for her nausea that she gets with migraines   [facility] as aside liked new rheumatologist  Has seen cardiology, awaiting results of recent visit (having tachycardia), she is concerned she has POTS       -states will have light headedness after standing for a while, and notes to have palpitations  "Just standing there."She asks to discuss POTS more.         At last visit: elavil and prednisone written, has nurtec from " PCP.   8 HD in past month, pain stays about 5/10 on average ( noted 2 may have been a bit higher)   Last mild HA right now, feels may be d/t sinuses   Pain retro orbital (R>L and then moves to ipsilateral forehead)  Elavil at 20 mg no side effects   Steroids had delayed benefit, no side effects   Mentions multi location joint pain, hyper mobility, hx of ulcers, GI issues, and numbness that migrates. Has seen rheum, been referred to PT, finds it not helping and would like second opinion.     HA Historically:   Start:HA in past (2 years ago) but were episodic (1 every few months), but became more prevalent in past 8-9 months  and in past 1-2 months more regularly, no triggering nidus per patient/fiance.   History of trauma (no), History of CNS infection (no), History of Stroke (no)  Location:right retroobital  and right temporal, however rarely on the left   Severity: range: 1-6/10  Duration:4+ hours   Frequency:15 HD in past month  Aura: vision change/blurred vision (infrequent)   Associated factors (bolded positive) WITH HA ( or migraine): Nausea, vomiting, photophobia, numbness in facie (rare), phonophobia, tinnitus, scalp pain, vision loss, diplopia, scintillations, eye pain, jaw pain, clumsiness (dropping things-?),  weakness?    Tried:ibuprofen   Triggers (in bold): stress, lack of sleep, too much caffeine, too little caffeine, weather change, seasonal change, strong odours, bright lights, sunlight, food , not a trigger but patient states she does miss meals frequently   Currently having a HA?:yes   Positives in bold: Hx of Kidney Stones, asthma, GI bleed, osteoporosis, CAD/MI, CVA/TIA, DM    Imaging on file: MRI Brain (non contrasted) 11/9/2023-NML  Therapies tried in past: (failures to be marked, if known---why did it fail?)  Zofran  Toradol  Lexapro  Imitrex  Nurtec   Elavil  Prednisone  Ubrelvy       Medication Reconciliation:   Current Outpatient Medications   Medication Sig Dispense Refill    albuterol  (VENTOLIN HFA) 90 mcg/actuation inhaler Inhale 2 puffs into the lungs every 6 (six) hours as needed for Wheezing. Rescue 18 g 0    amitriptyline (ELAVIL) 25 MG tablet Take1 pill approx 2 hours before bed 30 tablet 6    metoprolol tartrate (LOPRESSOR) 25 MG tablet Take 0.5 tablets (12.5 mg total) by mouth 2 (two) times daily. 180 tablet 3    montelukast (SINGULAIR) 10 mg tablet Take 1 tablet (10 mg total) by mouth every evening. 30 tablet 11    ondansetron (ZOFRAN-ODT) 4 MG TbDL Take 1 tablet (4 mg total) by mouth every 8 (eight) hours as needed (nausea). 20 tablet 3    ubrogepant (UBRELVY) 100 mg tablet Take 1 tablet by mouth at onset headache, second tablet 2 hours later if needed, no more than 2 tablets per day day/3 days use in week 10 tablet 6    albuterol-budesonide (AIRSUPRA) 90-80 mcg/actuation Inhale 2 puffs into the lungs every 4 (four) hours as needed (asthma). (Patient not taking: Reported on 12/18/2024) 10.7 g 11    cetirizine (ZYRTEC) 10 MG tablet Take 1 tablet (10 mg total) by mouth once daily. 30 tablet 0     No current facility-administered medications for this visit.     Review of patient's allergies indicates:  No Known Allergies    PMHx:  Past Medical History:   Diagnosis Date    Asthma     GERD (gastroesophageal reflux disease)     Headache     Kidney stones      No past surgical history on file.    Fhx:  Family History   Problem Relation Name Age of Onset    Arthritis Mother      Hypertension Mother      Hypertension Father      Hypertension Maternal Aunt      Hypertension Maternal Uncle         Shx:   Social History     Socioeconomic History    Marital status:    Tobacco Use    Smoking status: Never    Smokeless tobacco: Never   Substance and Sexual Activity    Alcohol use: No    Drug use: Never     Social Drivers of Health     Financial Resource Strain: High Risk (11/9/2023)    Overall Financial Resource Strain (CARDIA)     Difficulty of Paying Living Expenses: Hard   Food Insecurity:  Food Insecurity Present (11/9/2023)    Hunger Vital Sign     Worried About Running Out of Food in the Last Year: Sometimes true     Ran Out of Food in the Last Year: Never true   Transportation Needs: No Transportation Needs (11/9/2023)    PRAPARE - Transportation     Lack of Transportation (Medical): No     Lack of Transportation (Non-Medical): No   Physical Activity: Sufficiently Active (11/9/2023)    Exercise Vital Sign     Days of Exercise per Week: 2 days     Minutes of Exercise per Session: 90 min   Stress: No Stress Concern Present (11/9/2023)    [employer] [place] [employer] - Occupational Stress Questionnaire     Feeling of Stress : Not at all   Housing Stability: High Risk (11/9/2023)    Housing Stability Vital Sign     Unable to Pay for Housing in the Last Year: Yes     Number of Places Lived in the Last Year: 1     Unstable Housing in the Last Year: No           Labs:   CMP  Sodium   Date Value Ref Range Status   11/09/2023 140 136 - 145 mmol/L Final     Potassium   Date Value Ref Range Status   11/09/2023 3.9 3.5 - 5.1 mmol/L Final     Chloride   Date Value Ref Range Status   11/09/2023 106 95 - 110 mmol/L Final     CO2   Date Value Ref Range Status   11/09/2023 25 23 - 29 mmol/L Final     Glucose   Date Value Ref Range Status   11/09/2023 96 70 - 110 mg/dL Final     BUN   Date Value Ref Range Status   11/09/2023 8 6 - 20 mg/dL Final     Creatinine   Date Value Ref Range Status   11/09/2023 0.9 0.5 - 1.4 mg/dL Final     Calcium   Date Value Ref Range Status   11/09/2023 9.3 8.7 - 10.5 mg/dL Final     Total Protein   Date Value Ref Range Status   11/09/2023 7.0 6.0 - 8.4 g/dL Final     Albumin   Date Value Ref Range Status   11/09/2023 4.3 3.5 - 5.2 g/dL Final     Total Bilirubin   Date Value Ref Range Status   11/09/2023 0.8 0.1 - 1.0 mg/dL Final     Comment:     For infants and newborns, interpretation of results should be based  on gestational age, weight and in agreement with  clinical  observations.    Premature Infant recommended reference ranges:  Up to 24 hours.............<8.0 mg/dL  Up to 48 hours............<12.0 mg/dL  3-5 days..................<15.0 mg/dL  6-29 days.................<15.0 mg/dL       Alkaline Phosphatase   Date Value Ref Range Status   2023 58 55 - 135 U/L Final     AST   Date Value Ref Range Status   2023 26 10 - 40 U/L Final     ALT   Date Value Ref Range Status   2023 29 10 - 44 U/L Final     Anion Gap   Date Value Ref Range Status   2023 9 8 - 16 mmol/L Final     eGFR   Date Value Ref Range Status   2023 >60 >60 mL/min/1.73 m^2 Final     Lab Results   Component Value Date    WBC 4.05 2023    HGB 12.9 2023    HCT 37.5 2023    MCV 92 2023     2023       Lab Results   Component Value Date    TSH 1.239 2023         Imagin2023 MRI Brain (report): Clinical history is headache for 7 days, blurry vision right eye     Multiplanar images of the brain were obtained.     The ventricles and sulci are normal in size and configuration for age. There is no hemorrhage, mass or midline shift. There are no extra-axial fluid collections.  There is normal signal within the white matter on all sequences. There is no abnormality on the diffusion-weighted images to suggest acute infarct. Cerebellum and brainstem are normal.  There are flow voids in the cavernous portions of the internal carotid arteries and basilar artery indicating patency. The corpus callosum, cerebellar tonsils, midline structures and orbits are normal.     IMPRESSION: Negative MRI of the brain      Other testing:  Reviewed in chart     Note: I have independently reviewed any/all imaging/labs/tests and agree with the report (s) as documented.  Any discrepancies will be as noted/demarcated by free text.  JOE ZAVALA 2024                     ROS:   Review Of Systems (questions asked, positive or additions in BOLD)  Gen: Weight  "change, fatigue/malaise, pyrexia   HEENT: Tinnitus, headache,  blurred vision, eye pain, diplopia, photophobia,  nose bleeds, congestion, sore throat, jaw pain, scalp pain, neck stiffness   Card: Palpitations, CP   Pulm: SOB, cough   Vas: Easy bruising, easy bleeding   GI: N/V/D/C, incontinence, hematemesis, hematochezia    : incontinence, hematuria   Endocrine: Temp intolerance, polyuria, polydipsia   M/S: Neck pain, myalgia, back pain, joint pain, falls    Neuro: PER HPI   PSY: Memory loss, confusion, depression, anxiety, trouble in sleep, hallucinations          EXAM:   Remains consistent   /70 (BP Location: Right arm, Patient Position: Sitting)   Pulse 66   Resp 17   Ht 5' 10" (1.778 m)   Wt 72.7 kg (160 lb 6.2 oz)   BMI 23.01 kg/m²    GEN:  NAD  HEENT: NC/AT   EXTREM:    no edema present.    NEURO:  Mental Status:  Awake, alert and appropriately oriented to time, place, and person.  Normal attention and concentration.  Speech is fluent and appropriate language function (I.e., comprehension).     Cranial Nerves:      Extraocular movements are intact and without nystagmus.  Visual fields are full to confrontation testing.    Facial movement is symmetric.  Hearing is normal. Uvula in midline. FROM of neck in all (6) directions, shoulder shrug symmetrical. Tongue in midline without fasiculation.     Motor:  antigravity in all limbs  Tremor/pronator drift not apparent.          Gait and Stance: Normal manner of stance and gait function testing.           This document has been electronically signed by Mr. Haider Dumont MPA, PA-C on 12/18/2024, I have personally typed this message using the EMR.       Dr Jesus MD was available during today's visit.          CC: Christoph Watson MD                 "

## 2025-01-06 DIAGNOSIS — R11.0 NAUSEA: ICD-10-CM

## 2025-01-07 RX ORDER — ONDANSETRON 4 MG/1
4 TABLET, ORALLY DISINTEGRATING ORAL EVERY 8 HOURS PRN
Qty: 20 TABLET | Refills: 3 | Status: SHIPPED | OUTPATIENT
Start: 2025-01-07

## 2025-03-13 ENCOUNTER — OFFICE VISIT (OUTPATIENT)
Dept: OBSTETRICS AND GYNECOLOGY | Facility: CLINIC | Age: 30
End: 2025-03-13
Payer: COMMERCIAL

## 2025-03-13 VITALS
DIASTOLIC BLOOD PRESSURE: 70 MMHG | WEIGHT: 159.38 LBS | BODY MASS INDEX: 22.87 KG/M2 | SYSTOLIC BLOOD PRESSURE: 118 MMHG

## 2025-03-13 DIAGNOSIS — Z13.0 SCREENING, ANEMIA, DEFICIENCY, IRON: ICD-10-CM

## 2025-03-13 DIAGNOSIS — Z13.1 SCREENING FOR DIABETES MELLITUS: ICD-10-CM

## 2025-03-13 DIAGNOSIS — Z12.4 CERVICAL CANCER SCREENING: ICD-10-CM

## 2025-03-13 DIAGNOSIS — Q79.60 EHLERS-DANLOS SYNDROME: ICD-10-CM

## 2025-03-13 DIAGNOSIS — Z13.220 SCREENING FOR HYPERLIPIDEMIA: ICD-10-CM

## 2025-03-13 DIAGNOSIS — Z11.3 SCREEN FOR STD (SEXUALLY TRANSMITTED DISEASE): ICD-10-CM

## 2025-03-13 DIAGNOSIS — Z13.29 SCREENING FOR THYROID DISORDER: ICD-10-CM

## 2025-03-13 DIAGNOSIS — G90.A POTS (POSTURAL ORTHOSTATIC TACHYCARDIA SYNDROME): ICD-10-CM

## 2025-03-13 DIAGNOSIS — Z01.411 ENCOUNTER FOR GYNECOLOGICAL EXAMINATION (GENERAL) (ROUTINE) WITH ABNORMAL FINDINGS: Primary | ICD-10-CM

## 2025-03-13 DIAGNOSIS — Z31.69 ENCOUNTER FOR PRECONCEPTION CONSULTATION: ICD-10-CM

## 2025-03-13 PROCEDURE — 99999 PR PBB SHADOW E&M-EST. PATIENT-LVL III: CPT | Mod: PBBFAC,,, | Performed by: OBSTETRICS & GYNECOLOGY

## 2025-03-13 PROCEDURE — 81515 NFCT DS BV&VAGINITIS DNA ALG: CPT | Performed by: OBSTETRICS & GYNECOLOGY

## 2025-03-13 PROCEDURE — 87491 CHLMYD TRACH DNA AMP PROBE: CPT | Performed by: OBSTETRICS & GYNECOLOGY

## 2025-03-13 NOTE — PROGRESS NOTES
Chief Complaint   Patient presents with    fertility       History and Physical:  Valerie Cole is a 29 y.o. F who presents today for her routine annual GYN exam. The patient has Gynecology complaints. Planning to conceive, has appointment with TRUPTI, same sex couple, patient with Kaelyn Danlos.     Annual:  Patient's last menstrual period was 03/02/2025.  Contraception: none  Menarche: 11  Menstrual cycle: monthly, lasting 3 day, heavy flow, & cramping.   Denies endometriosis, pcos, or fibroids  Last Pap: 2022 normal  History of abnormal pap: none  STD testing: desires  Immunization status: up to date and documented. Status post HPV vaccination    Allergies: Review of patient's allergies indicates:  No Known Allergies  Past Medical History:   Diagnosis Date    Asthma     GERD (gastroesophageal reflux disease)     Headache     Kidney stones      History reviewed. No pertinent surgical history.  MEDS: Medications Ordered Prior to Encounter[1]  OB History    No obstetric history on file.       Social History[2]  Family History   Problem Relation Name Age of Onset    Hypertension Father      Arthritis Mother      Hypertension Mother      Hypertension Maternal Aunt      Hypertension Maternal Uncle      Colon cancer Neg Hx      Uterine cancer Neg Hx      Breast cancer Neg Hx      Ovarian cancer Neg Hx         Past medical and surgical history reviewed.   I have reviewed the patient's medical history in detail and updated the computerized patient record.    Review of System:   General: no chills, fever, night sweats, weight gain or weight loss  Breasts: no new or changing breast lumps, nipple discharge or masses.  Gastrointestinal: no abdominal pain, change in bowel habits, or black or bloody stools  Genito-Urinary: no incontinence, urinary frequency/urgency or vulvar/vaginal symptoms, pelvic pain or abnormal vaginal bleeding.    Physical Exam:   /70 (Patient Position: Sitting)   Wt 72.3 kg (159 lb 6.3 oz)   LMP  03/02/2025   BMI 22.87 kg/m²   Body mass index is 22.87 kg/m².  Constitutional: She appears alert and responsive. She appears well-developed, well-groomed, and well-nourished. No distress.  Breasts: are symmetrical.  Right breast exhibits no inverted nipple, no mass, no nipple discharge, no skin change and no tenderness.  Left breast exhibits no inverted nipple, no mass, no nipple discharge, no skin change and no tenderness.  Abdominal: Soft. She exhibits no distension, hernias or masses. There is no tenderness. No enlargement of liver edge or spleen.  There is no rebound and no guarding.   Genitourinary:    External rectal exam shows no thrombosed external hemorrhoids, no lesions.     Pelvic exam was performed with patient supine.   No labial fusion, and symmetrical.    There is no rash, lesion or injury on the right labia.   There is no rash, lesion or injury on the left labia.   No bleeding and no signs of injury around the vaginal introitus, urethral meatus is normal size and without prolapse or lesions, urethra well supported. The cervix is visualized with no discharge, lesions or friability.   No vaginal discharge found.    No significant Cystocele, Enterocele or rectocele, and cervix and uterus well supported.   Bimanual exam:   The urethra is normal to palpation and there are no palpable vaginal wall masses.   Uterus is not deviated, not enlarged, not fixed, normal shape and not tender.   Cervix exhibits no motion tenderness.    Right adnexum displays no mass or nodularity and no tenderness.   Left adnexum displays no mass or nodularity and no tenderness.    Assessment/Plan:   Encounter for gynecological examination (general) (routine) with abnormal findings    Encounter for preconception consultation  -     Varicella Zoster Antibody, IgG; Future; Expected date: 03/13/2025  -     Rubella Antibody, IgG; Future; Expected date: 03/13/2025  -     Ambulatory referral/consult to Genetics; Future; Expected date:  03/20/2025  -     Ambulatory referral/consult to Perinatology; Future; Expected date: 03/20/2025    Screen for STD (sexually transmitted disease)  -     C. trachomatis/N. gonorrhoeae by AMP DNA [facility]; Cervicovaginal  -     Vaginosis Screen by DNA Probe  -     Hepatitis B Surface Antigen; Future; Expected date: 03/13/2025  -     Hepatitis C Antibody; Future; Expected date: 03/13/2025  -     HIV 1/2 Ag/Ab (4th Gen); Future; Expected date: 03/13/2025  -     Treponema Pallidium Antibodies IgG, IgM; Future; Expected date: 03/13/2025    Screening for diabetes mellitus  -     Glucose, Fasting; Future; Expected date: 03/13/2025  -     Hemoglobin A1C; Future; Expected date: 03/13/2025    Screening for hyperlipidemia  -     Lipid Panel; Future; Expected date: 03/13/2025    Screening, anemia, deficiency, iron  -     CBC Auto Differential; Future; Expected date: 03/13/2025    Screening for thyroid disorder  -     TSH; Future; Expected date: 03/13/2025    Kaelyn-Danlos syndrome  -     Ambulatory referral/consult to Genetics; Future; Expected date: 03/20/2025  -     Ambulatory referral/consult to Perinatology; Future; Expected date: 03/20/2025    Cervical cancer screening    POTS (postural orthostatic tachycardia syndrome)  -     Ambulatory referral/consult to Perinatology; Future; Expected date: 03/20/2025    Start Prenatal vitamins. Discussed safe medications. Discussed genetic testing. Baseline work up ordered. Referral to Genetics and [facility] for preconception consult.             [1]   Current Outpatient Medications on File Prior to Visit   Medication Sig Dispense Refill    albuterol (VENTOLIN HFA) 90 mcg/actuation inhaler Inhale 2 puffs into the lungs every 6 (six) hours as needed for Wheezing. Rescue 18 g 0    cetirizine (ZYRTEC) 10 MG tablet Take 1 tablet (10 mg total) by mouth once daily. 30 tablet 0    metoprolol tartrate (LOPRESSOR) 25 MG tablet Take ONE-HALF tablet by mouth 2 (two) times daily. 180 tablet 3     montelukast (SINGULAIR) 10 mg tablet Take 1 tablet (10 mg total) by mouth every evening. 30 tablet 11    nortriptyline (PAMELOR) 25 MG capsule Take 1 capsule (25 mg total) by mouth every evening. 30 capsule 11    ondansetron (ZOFRAN-ODT) 4 MG TbDL Take 1 tablet (4 mg total) by mouth every 8 (eight) hours as needed (nausea). 20 tablet 3    predniSONE (DELTASONE) 20 MG tablet On full stomach (breakfast): Take 3 tablets by mouth once daily for 2 days, then take 2 tablets by mouth once daily for 2 days, then take 1 tablet by mouth daily for 2 days. then stop 12 tablet 0    ubrogepant (UBRELVY) 100 mg tablet Take 1 tablet by mouth at onset headache, second tablet 2 hours later if needed, no more than 2 tablets per day day/3 days use in week 10 tablet 6    albuterol-budesonide (AIRSUPRA) 90-80 mcg/actuation Inhale 2 puffs into the lungs every 4 (four) hours as needed (asthma). (Patient not taking: Reported on 3/13/2025) 10.7 g 11     No current facility-administered medications on file prior to visit.   [2]   Social History  Socioeconomic History    Marital status:    Tobacco Use    Smoking status: Never    Smokeless tobacco: Never   Substance and Sexual Activity    Alcohol use: No    Drug use: Never    Sexual activity: Yes     Partners: Female     Birth control/protection: OCP     Social Drivers of Health     Financial Resource Strain: Patient Declined (12/18/2024)    Overall Financial Resource Strain (CARDIA)     Difficulty of Paying Living Expenses: Patient declined   Food Insecurity: Patient Declined (12/18/2024)    Hunger Vital Sign     Worried About Running Out of Food in the Last Year: Patient declined     Ran Out of Food in the Last Year: Patient declined   Transportation Needs: No Transportation Needs (11/9/2023)    PRAPARE - Transportation     Lack of Transportation (Medical): No     Lack of Transportation (Non-Medical): No   Physical Activity: Sufficiently Active (12/18/2024)    Exercise Vital Sign      Days of Exercise per Week: 3 days     Minutes of Exercise per Session: 60 min   Stress: No Stress Concern Present (12/18/2024)    [employer] [place] [employer] - Occupational Stress Questionnaire     Feeling of Stress : Not at all   Housing Stability: Unknown (12/18/2024)    Housing Stability Vital Sign     Unable to Pay for Housing in the Last Year: Patient declined

## 2025-03-14 ENCOUNTER — LAB VISIT (OUTPATIENT)
Dept: LAB | Facility: HOSPITAL | Age: 30
End: 2025-03-14
Attending: OBSTETRICS & GYNECOLOGY
Payer: COMMERCIAL

## 2025-03-14 DIAGNOSIS — Z13.0 SCREENING, ANEMIA, DEFICIENCY, IRON: ICD-10-CM

## 2025-03-14 DIAGNOSIS — Z13.1 SCREENING FOR DIABETES MELLITUS: ICD-10-CM

## 2025-03-14 DIAGNOSIS — Z11.3 SCREEN FOR STD (SEXUALLY TRANSMITTED DISEASE): ICD-10-CM

## 2025-03-14 DIAGNOSIS — Z13.29 SCREENING FOR THYROID DISORDER: ICD-10-CM

## 2025-03-14 DIAGNOSIS — Z13.220 SCREENING FOR HYPERLIPIDEMIA: ICD-10-CM

## 2025-03-14 DIAGNOSIS — Z31.69 ENCOUNTER FOR PRECONCEPTION CONSULTATION: ICD-10-CM

## 2025-03-14 LAB
BACTERIAL VAGINOSIS DNA: NOT DETECTED
BASOPHILS # BLD AUTO: 0.02 K/UL (ref 0–0.2)
BASOPHILS NFR BLD: 0.5 % (ref 0–1.9)
C TRACH DNA SPEC QL NAA+PROBE: NOT DETECTED
CANDIDA GLABRATA/KRUSEI: NOT DETECTED
CANDIDA RRNA VAG QL PROBE: NOT DETECTED
CHOLEST SERPL-MCNC: 148 MG/DL (ref 120–199)
CHOLEST/HDLC SERPL: 2.8 {RATIO} (ref 2–5)
DIFFERENTIAL METHOD BLD: NORMAL
EOSINOPHIL # BLD AUTO: 0 K/UL (ref 0–0.5)
EOSINOPHIL NFR BLD: 0.7 % (ref 0–8)
ERYTHROCYTE [DISTWIDTH] IN BLOOD BY AUTOMATED COUNT: 12.9 % (ref 11.5–14.5)
ESTIMATED AVG GLUCOSE: 100 MG/DL (ref 68–131)
GLUCOSE SERPL-MCNC: 98 MG/DL (ref 70–110)
HBA1C MFR BLD: 5.1 % (ref 4–5.6)
HBV SURFACE AG SERPL QL IA: NORMAL
HCT VFR BLD AUTO: 41 % (ref 37–48.5)
HCV AB SERPL QL IA: NORMAL
HDLC SERPL-MCNC: 53 MG/DL (ref 40–75)
HDLC SERPL: 35.8 % (ref 20–50)
HGB BLD-MCNC: 13.3 G/DL (ref 12–16)
HIV 1+2 AB+HIV1 P24 AG SERPL QL IA: NORMAL
IMM GRANULOCYTES # BLD AUTO: 0.01 K/UL (ref 0–0.04)
IMM GRANULOCYTES NFR BLD AUTO: 0.2 % (ref 0–0.5)
LDLC SERPL CALC-MCNC: 81.4 MG/DL (ref 63–159)
LYMPHOCYTES # BLD AUTO: 2 K/UL (ref 1–4.8)
LYMPHOCYTES NFR BLD: 46.3 % (ref 18–48)
MCH RBC QN AUTO: 30.6 PG (ref 27–31)
MCHC RBC AUTO-ENTMCNC: 32.4 G/DL (ref 32–36)
MCV RBC AUTO: 95 FL (ref 82–98)
MONOCYTES # BLD AUTO: 0.4 K/UL (ref 0.3–1)
MONOCYTES NFR BLD: 9.4 % (ref 4–15)
N GONORRHOEA DNA SPEC QL NAA+PROBE: NOT DETECTED
NEUTROPHILS # BLD AUTO: 1.9 K/UL (ref 1.8–7.7)
NEUTROPHILS NFR BLD: 42.9 % (ref 38–73)
NONHDLC SERPL-MCNC: 95 MG/DL
NRBC BLD-RTO: 0 /100 WBC
PLATELET # BLD AUTO: 240 K/UL (ref 150–450)
PMV BLD AUTO: 9.6 FL (ref 9.2–12.9)
RBC # BLD AUTO: 4.34 M/UL (ref 4–5.4)
TREPONEMA PALLIDUM IGG+IGM AB [PRESENCE] IN SERUM OR PLASMA BY IMMUNOASSAY: NONREACTIVE
TRICHOMONAS VAGINALIS: NOT DETECTED
TRIGL SERPL-MCNC: 68 MG/DL (ref 30–150)
TSH SERPL DL<=0.005 MIU/L-ACNC: 1.08 UIU/ML (ref 0.4–4)
WBC # BLD AUTO: 4.36 K/UL (ref 3.9–12.7)

## 2025-03-14 PROCEDURE — 80061 LIPID PANEL: CPT | Performed by: OBSTETRICS & GYNECOLOGY

## 2025-03-14 PROCEDURE — 84443 ASSAY THYROID STIM HORMONE: CPT | Performed by: OBSTETRICS & GYNECOLOGY

## 2025-03-14 PROCEDURE — 87340 HEPATITIS B SURFACE AG IA: CPT | Performed by: OBSTETRICS & GYNECOLOGY

## 2025-03-14 PROCEDURE — 85025 COMPLETE CBC W/AUTO DIFF WBC: CPT | Performed by: OBSTETRICS & GYNECOLOGY

## 2025-03-14 PROCEDURE — 83036 HEMOGLOBIN GLYCOSYLATED A1C: CPT | Performed by: OBSTETRICS & GYNECOLOGY

## 2025-03-14 PROCEDURE — 87389 HIV-1 AG W/HIV-1&-2 AB AG IA: CPT | Performed by: OBSTETRICS & GYNECOLOGY

## 2025-03-14 PROCEDURE — 86787 VARICELLA-ZOSTER ANTIBODY: CPT | Performed by: OBSTETRICS & GYNECOLOGY

## 2025-03-14 PROCEDURE — 86593 SYPHILIS TEST NON-TREP QUANT: CPT | Performed by: OBSTETRICS & GYNECOLOGY

## 2025-03-14 PROCEDURE — 86762 RUBELLA ANTIBODY: CPT | Performed by: OBSTETRICS & GYNECOLOGY

## 2025-03-14 PROCEDURE — 86803 HEPATITIS C AB TEST: CPT | Performed by: OBSTETRICS & GYNECOLOGY

## 2025-03-14 PROCEDURE — 82947 ASSAY GLUCOSE BLOOD QUANT: CPT | Performed by: OBSTETRICS & GYNECOLOGY

## 2025-03-17 ENCOUNTER — RESULTS FOLLOW-UP (OUTPATIENT)
Dept: OBSTETRICS AND GYNECOLOGY | Facility: CLINIC | Age: 30
End: 2025-03-17

## 2025-03-17 LAB
RUBV IGG SER-ACNC: 304 IU/ML
RUBV IGG SER-IMP: REACTIVE
VARICELLA INTERPRETATION: POSITIVE
VARICELLA ZOSTER IGG: 11.3 S/CO

## 2025-03-26 ENCOUNTER — OFFICE VISIT (OUTPATIENT)
Dept: MATERNAL FETAL MEDICINE | Facility: CLINIC | Age: 30
End: 2025-03-26
Payer: COMMERCIAL

## 2025-03-26 VITALS
SYSTOLIC BLOOD PRESSURE: 114 MMHG | DIASTOLIC BLOOD PRESSURE: 55 MMHG | HEIGHT: 70 IN | OXYGEN SATURATION: 99 % | WEIGHT: 160.94 LBS | HEART RATE: 65 BPM | BODY MASS INDEX: 23.04 KG/M2

## 2025-03-26 DIAGNOSIS — Z31.69 ENCOUNTER FOR PRECONCEPTION CONSULTATION: ICD-10-CM

## 2025-03-26 DIAGNOSIS — G90.A POTS (POSTURAL ORTHOSTATIC TACHYCARDIA SYNDROME): ICD-10-CM

## 2025-03-26 DIAGNOSIS — G43.819 OTHER MIGRAINE WITHOUT STATUS MIGRAINOSUS, INTRACTABLE: Primary | ICD-10-CM

## 2025-03-26 DIAGNOSIS — Q79.60 EHLERS-DANLOS SYNDROME: ICD-10-CM

## 2025-03-26 PROBLEM — G43.909 MIGRAINES: Status: ACTIVE | Noted: 2025-03-26

## 2025-03-26 PROCEDURE — 3008F BODY MASS INDEX DOCD: CPT | Mod: CPTII,S$GLB,, | Performed by: STUDENT IN AN ORGANIZED HEALTH CARE EDUCATION/TRAINING PROGRAM

## 2025-03-26 PROCEDURE — 99999 PR PBB SHADOW E&M-EST. PATIENT-LVL III: CPT | Mod: PBBFAC,,, | Performed by: STUDENT IN AN ORGANIZED HEALTH CARE EDUCATION/TRAINING PROGRAM

## 2025-03-26 PROCEDURE — 3074F SYST BP LT 130 MM HG: CPT | Mod: CPTII,S$GLB,, | Performed by: STUDENT IN AN ORGANIZED HEALTH CARE EDUCATION/TRAINING PROGRAM

## 2025-03-26 PROCEDURE — 3044F HG A1C LEVEL LT 7.0%: CPT | Mod: CPTII,S$GLB,, | Performed by: STUDENT IN AN ORGANIZED HEALTH CARE EDUCATION/TRAINING PROGRAM

## 2025-03-26 PROCEDURE — 3078F DIAST BP <80 MM HG: CPT | Mod: CPTII,S$GLB,, | Performed by: STUDENT IN AN ORGANIZED HEALTH CARE EDUCATION/TRAINING PROGRAM

## 2025-03-26 PROCEDURE — 99205 OFFICE O/P NEW HI 60 MIN: CPT | Mod: S$GLB,,, | Performed by: STUDENT IN AN ORGANIZED HEALTH CARE EDUCATION/TRAINING PROGRAM

## 2025-03-26 PROCEDURE — 1159F MED LIST DOCD IN RCRD: CPT | Mod: CPTII,S$GLB,, | Performed by: STUDENT IN AN ORGANIZED HEALTH CARE EDUCATION/TRAINING PROGRAM

## 2025-03-26 NOTE — ASSESSMENT & PLAN NOTE
"MIGRAINE HEADACHES  In pregnancy, migraine headaches often improve, with about 1/3 of patients experiencing worsening symptoms. The mainstays of therapy include supportive care and acetaminophen use. NSAIDS should be avoided due to potential premature closure of the ductus arteriosus. Second line therapy includes the use of Metoclopramide and Benadryl. Opioid use is discouraged as they have a habit-forming quality and can worsen the clinical situation with the formation of rebound headaches. Fioricet is no longer considered a good alternative due to the absence of supplemental analgesia and the associated risks of medication overuse headache and addiction.  Pharmacologic interventions to prevent primary headaches during pregnancy may be considered in those experiencing frequent or disruptive headaches and after careful discussion with the patient .  First line prophylaxis in the prevention of primary headaches are calcium channel blockers and antihistamines. The use of beta-blockers and oral magnesium remain reasonable options as well.  If an antiepileptic is to be considered for headache prevention, carbamazepine should be discussed. However, it may be associated with major congenital anomalies (OR 1.37) and minor anomalies (OR 10.8)    Elavil (Amitriptyline) is a Class C drug in pregnancy, as denoted by the FDA. Although its use is not recommended in pregnancy. It may be considered as an alternative if resistant to primary modes of prophylaxis. Though there has been no direct causal effect delineated, TCA in general are associated with multiple congenital defects (OR 1.31) such as microcephaly, skeletal abnormalities, "cotton- like" hair with pronounced shedding, cleft palate, micrognathia, ambiguous genitalia, and dermal ridges. Its use may also be associated with  convulsion (OR 7.82),  respiratory distress (OR 2.11)    Recommendations:  Avoid NSAID use  Encourage hydration  She is on " Nortryptiline with plans to switch medications to a medication with better safety profile prior to IVF  Continue consultation and follow-up with Neurology  If migraines occur >1x per week, we encourage consideration of prophylactic treatment; however avoid use in the first trimester if possible  If possible, consider alternatives to Topamax and Elavil during pregnancy, especially during the first trimester. However, if no other therapies are appropriate, then the risks/benefits should be re-discussed with the patient.  Consider for treatment:  Acetaminophen 1000 mg every 6-8 hours PO as needed for recurrent headache  Can pair with use of caffeine, though not more than 200 mg daily, metoclopramide 10 mg PO (Rx sent), Mag sulfate 400mg PO and benadryl 12.5mg.  Persistent headaches despite that regimen should be evaluated by primary team  For prophylaxis against recurrent headaches, consider magnesium sulfate 400 mg PO daily  Regardless of continuation or discontinuation of any medications, continue serial growth ultrasounds due to her medication exposures during gestation.    her on signs and symptoms of preeclampsia, as her headaches may confound diagnosis  Breastfeeding:  Topiramate: This compound does pass into breast milk, but its effects on developing babies remain unknown, so is not an absolute contraindication.   Amitriptyline: Existing data indicate that very little nortriptyline (active metabolite) is ingested by a breastfeeding infant and that maternal use of amitriptyline during breastfeeding is probably safe. However, the prolonged half-life of nortriptyline in newborns suggests that some infants may be at risk of accumulation. If the patient elects to breastfeed during amitriptyline therapy, the infant should be monitored for adverse effects.

## 2025-03-26 NOTE — PROGRESS NOTES
"MATERNAL-FETAL MEDICINE   CONSULT NOTE    Provider requesting consultation: Dr. Montelongo  SUBJECTIVE:   Ms. Valerie Cole is a 29 y.o. female who is seen in consultation by MFM for evaluation and management of:  Problem   Kaelyn-Danlos Syndrome   Pots (Postural Orthostatic Tachycardia Syndrome)   Migraines        Medication List with Changes/Refills   Current Medications    ALBUTEROL (VENTOLIN HFA) 90 MCG/ACTUATION INHALER    Inhale 2 puffs into the lungs every 6 (six) hours as needed for Wheezing. Rescue    ALBUTEROL-BUDESONIDE (AIRSUPRA) 90-80 MCG/ACTUATION    Inhale 2 puffs into the lungs every 4 (four) hours as needed (asthma).    CETIRIZINE (ZYRTEC) 10 MG TABLET    Take 1 tablet (10 mg total) by mouth once daily.    METOPROLOL TARTRATE (LOPRESSOR) 25 MG TABLET    Take ONE-HALF tablet by mouth 2 (two) times daily.    MONTELUKAST (SINGULAIR) 10 MG TABLET    Take 1 tablet (10 mg total) by mouth every evening.    NORTRIPTYLINE (PAMELOR) 25 MG CAPSULE    Take 1 capsule (25 mg total) by mouth every evening.    ONDANSETRON (ZOFRAN-ODT) 4 MG TBDL    Take 1 tablet (4 mg total) by mouth every 8 (eight) hours as needed (nausea).    UBROGEPANT (UBRELVY) 100 MG TABLET    Take 1 tablet by mouth at onset headache, second tablet 2 hours later if needed, no more than 2 tablets per day day/3 days use in week     Review of patient's allergies indicates:  No Known Allergies  OB History   No obstetric history on file.     Past Medical History:   Diagnosis Date    Asthma     Kaelyn-Danlos syndrome     GERD (gastroesophageal reflux disease)     Headache     Kidney stones     POTS (postural orthostatic tachycardia syndrome)      No past surgical history on file.  Family history: negative for birth defects, recurrent miscarriages, chromosomal abnormalities.   Social History[1]  Review of patient's allergies indicates:  No Known Allergies  Objective:   BP (!) 114/55   Pulse 65   Ht 5' 10" (1.778 m)   Wt 73 kg (160 lb 15 oz)   LMP " 2025   SpO2 99%   BMI 23.09 kg/m²   ASSESSMENT/PLAN:     29 y.o. No obstetric history on file. female with IUP at Unknown     Kaelyn-Danlos syndrome  She was counseled on her  Possible Kaelyn Danlos syndrome and pregnancy. Pregnancy is generally well-tolerated in women with Kaelyn-Danlos syndrome, with favorable maternal and  outcomes. In Kaelyn-Danlos syndrome type IV it may be associated with severe maternal complications.     Kaelyn-Danlos syndrome (EDS) is a group of connective tissue disorders which are divided into various distinguishable phenotypes. The type of EDS determines the potential obstetric complications. Due to the spectrum of clinical manifestation and overlap between phenotypes, there are no standardised obstetric management guidelines. Available literature demonstrates different obstetric management depending on the clinical status of the patient with respect to those patients with the hypermobility type of EDS:  including uneventful term vaginal deliveries as well as   section deliveries. Depending on the patient's risk for joint dislocation and pain morbidity,  section may be deemed the appropriate mode of delivery in some patients.  In general, patients have good maternal and  outcomes.     Hypermobility type of EDS is associated with relatively benign musculoskeletal problems including joint dislocation and pain. No contraindications to pregnancy in this type of EDS have been described. Classical and hypermobility types are the most common types of EDS, accounting for 60% of all EDS.  spontaneous labour is associated with EDS, and premature rupture of membranes leading to premature births in EDS has been well documented.     Consider medical genetics consultation    POTS (postural orthostatic tachycardia syndrome)  Postural Orthostatic Tachycardia Syndrome (POTS)   Regular postural hypotension is typically characterized by decreased  cerebral perfusion after standing resulting from hypotension secondary to a blunted sympathetic reflex. POTS is characterized by postural intolerance in similar situations but with retained autonomic reflexes.There are a heterogeneous set of disorders with similar clinical characteristics, but all are linked by orthostatic intolerance, defined as the provocation of symptoms on standing. Thus, there is an exaggerated tachycardic response without hypotension or syncope. There are also typically symptoms like migraine, fatigue, and nausea. Overall, it is a relatively common form of autonomic dysfunction, affecting approximately 500,000 [place].   The birth weights and rate of  birth are approximated that of the general population. POTS symptoms typically worsen in 40% of patients and improve in 40%. There were no adverse fetal or  outcomes.    Well controlled. Vitals stable. Previously had symptoms of spots in vision and episodes of lightheadedness- no episode of syncope. These are now controlled with Metoprolol  Recommendations:  Follows with Dr. Chopra   Continue Metoprolol 25 mg daily- discussed association with fetal growth restriction  I recommended avoiding circumstances where she can become volume depleted or vasodilated (e.g. hot weather, hot tubs, saunas, steam rooms, and GI distress). She can consume liquids (e.g. Gatorade), increase her sodium intake, elevate the head of her bed, and wear compression stockings during the day. Mild exercise on a stationary bicycle is also recommended, if tolerated.  She should be well hydrated throughout her pregnancy. Keeping hydrated with keep preload high and reduce the decrease in blood pressure sensed by carotid baroreceptors, which may contribute to reflex sympathetic responses.  Avoidance of stressors and the subsequent triggers of a tachycardic response. Avoidance of infections and allergies, which are triggers of POTS episodes.  She may require  reduced activity in the third trimester, particularly if her episodes are severe or more frequent.   Anesthesia consultation in pregnancy would be very recommended; they may find the report in Brit J Anaesth 2006;97(2):196-9 helpful.  Early epidural would be recommended if labor were considered, in order to blunt the physical stresses of labor.  Epidural anesthesia at the time of  may be more prudent than spinal to avoid the rapid hemodynamic changes sometimes seen after spinal.  Adequate prehydration in labor or before surgery.  Phenylephrine may be the agent of choice in cases of severe, acute hypotension on Labor and Birth, but this should be carefully titrated.  Florinef may be helpful in situations of recurrent episodes.      Migraines  MIGRAINE HEADACHES  In pregnancy, migraine headaches often improve, with about 1/3 of patients experiencing worsening symptoms. The mainstays of therapy include supportive care and acetaminophen use. NSAIDS should be avoided due to potential premature closure of the ductus arteriosus. Second line therapy includes the use of Metoclopramide and Benadryl. Opioid use is discouraged as they have a habit-forming quality and can worsen the clinical situation with the formation of rebound headaches. Fioricet is no longer considered a good alternative due to the absence of supplemental analgesia and the associated risks of medication overuse headache and addiction.  Pharmacologic interventions to prevent primary headaches during pregnancy may be considered in those experiencing frequent or disruptive headaches and after careful discussion with the patient .  First line prophylaxis in the prevention of primary headaches are calcium channel blockers and antihistamines. The use of beta-blockers and oral magnesium remain reasonable options as well.  If an antiepileptic is to be considered for headache prevention, carbamazepine should be discussed. However, it may be associated with  "major congenital anomalies (OR 1.37) and minor anomalies (OR 10.8)    Elavil (Amitriptyline) is a Class C drug in pregnancy, as denoted by the FDA. Although its use is not recommended in pregnancy. It may be considered as an alternative if resistant to primary modes of prophylaxis. Though there has been no direct causal effect delineated, TCA in general are associated with multiple congenital defects (OR 1.31) such as microcephaly, skeletal abnormalities, "cotton- like" hair with pronounced shedding, cleft palate, micrognathia, ambiguous genitalia, and dermal ridges. Its use may also be associated with  convulsion (OR 7.82),  respiratory distress (OR 2.11)    Recommendations:  Avoid NSAID use  Encourage hydration  She is on Nortryptiline with plans to switch medications to a medication with better safety profile prior to IVF  Continue consultation and follow-up with Neurology  If migraines occur >1x per week, we encourage consideration of prophylactic treatment; however avoid use in the first trimester if possible  If possible, consider alternatives to Topamax and Elavil during pregnancy, especially during the first trimester. However, if no other therapies are appropriate, then the risks/benefits should be re-discussed with the patient.  Consider for treatment:  Acetaminophen 1000 mg every 6-8 hours PO as needed for recurrent headache  Can pair with use of caffeine, though not more than 200 mg daily, metoclopramide 10 mg PO (Rx sent), Mag sulfate 400mg PO and benadryl 12.5mg.  Persistent headaches despite that regimen should be evaluated by primary team  For prophylaxis against recurrent headaches, consider magnesium sulfate 400 mg PO daily  Regardless of continuation or discontinuation of any medications, continue serial growth ultrasounds due to her medication exposures during gestation.    her on signs and symptoms of preeclampsia, as her headaches may confound " diagnosis  Breastfeeding:  Topiramate: This compound does pass into breast milk, but its effects on developing babies remain unknown, so is not an absolute contraindication.   Amitriptyline: Existing data indicate that very little nortriptyline (active metabolite) is ingested by a breastfeeding infant and that maternal use of amitriptyline during breastfeeding is probably safe. However, the prolonged half-life of nortriptyline in newborns suggests that some infants may be at risk of accumulation. If the patient elects to breastfeed during amitriptyline therapy, the infant should be monitored for adverse effects.    Preconceptional Recommendations:  Optimization of the patient's comorbid conditions  Avoidance of drug use/alcohol/tobacco  Elimination, if possible, of any unsafe medications  Daily prenatal vitamin.   Daily folic acid supplement (1mg).  Ensure vaccinations are up to date (defer conception for 1 month after MMR and varicella vaccine)  Consideration of genetic carrier screening (hemoglobinopathy, cystic fibrosis, and SMA screening) and discussion of expanded carrier screening as appropriate  Laboratory evaluation including: Rubella, Varicella, Hepatitis B surface antigen, CBC, HIV.  If the patient is Rh-negative, blood type determination of her partner.  Plan for IVF with donor sperm.    Thank you for referring this patient for preconception consultation. We would be happy to meet with her again once pregnancy has been established    Gato Malone  Maternal-Fetal Medicine    Electronically Signed by Gato Malone March 28, 2025           [1]   Social History  Tobacco Use    Smoking status: Never    Smokeless tobacco: Never   Substance Use Topics    Alcohol use: No    Drug use: Never

## 2025-03-26 NOTE — ASSESSMENT & PLAN NOTE
She was counseled on her  Possible Kaelyn Danlos syndrome and pregnancy. Pregnancy is generally well-tolerated in women with Kaelyn-Danlos syndrome, with favorable maternal and  outcomes. In Kaelyn-Danlos syndrome type IV it may be associated with severe maternal complications.     Kaelyn-Danlos syndrome (EDS) is a group of connective tissue disorders which are divided into various distinguishable phenotypes. The type of EDS determines the potential obstetric complications. Due to the spectrum of clinical manifestation and overlap between phenotypes, there are no standardised obstetric management guidelines. Available literature demonstrates different obstetric management depending on the clinical status of the patient with respect to those patients with the hypermobility type of EDS:  including uneventful term vaginal deliveries as well as   section deliveries. Depending on the patient's risk for joint dislocation and pain morbidity,  section may be deemed the appropriate mode of delivery in some patients.  In general, patients have good maternal and  outcomes.     Hypermobility type of EDS is associated with relatively benign musculoskeletal problems including joint dislocation and pain. No contraindications to pregnancy in this type of EDS have been described. Classical and hypermobility types are the most common types of EDS, accounting for 60% of all EDS.  spontaneous labour is associated with EDS, and premature rupture of membranes leading to premature births in EDS has been well documented.

## 2025-03-26 NOTE — ASSESSMENT & PLAN NOTE
Postural Orthostatic Tachycardia Syndrome (POTS)   Regular postural hypotension is typically characterized by decreased cerebral perfusion after standing resulting from hypotension secondary to a blunted sympathetic reflex. POTS is characterized by postural intolerance in similar situations but with retained autonomic reflexes.There are a heterogeneous set of disorders with similar clinical characteristics, but all are linked by orthostatic intolerance, defined as the provocation of symptoms on standing. Thus, there is an exaggerated tachycardic response without hypotension or syncope. There are also typically symptoms like migraine, fatigue, and nausea. Overall, it is a relatively common form of autonomic dysfunction, affecting approximately 500,000 [place].   The birth weights and rate of  birth are approximated that of the general population. POTS symptoms typically worsen in 40% of patients and improve in 40%. There were no adverse fetal or  outcomes.    Well controlled. Vitals stable. Previously had symptoms of spots in vision and episodes of lightheadedness- no episode of syncope. These are now controlled with Metoprolol  Recommendations:  Follows with Dr. Chopra   Continue Metoprolol 25 mg daily- discussed association with fetal growth restriction  I recommended avoiding circumstances where she can become volume depleted or vasodilated (e.g. hot weather, hot tubs, saunas, steam rooms, and GI distress). She can consume liquids (e.g. Gatorade), increase her sodium intake, elevate the head of her bed, and wear compression stockings during the day. Mild exercise on a stationary bicycle is also recommended, if tolerated.  She should be well hydrated throughout her pregnancy. Keeping hydrated with keep preload high and reduce the decrease in blood pressure sensed by carotid baroreceptors, which may contribute to reflex sympathetic responses.  Avoidance of stressors and the subsequent triggers of a  tachycardic response. Avoidance of infections and allergies, which are triggers of POTS episodes.  She may require reduced activity in the third trimester, particularly if her episodes are severe or more frequent.   Anesthesia consultation in pregnancy would be very recommended; they may find the report in Brit J Anaesth 2006;97(2):196-9 helpful.  Early epidural would be recommended if labor were considered, in order to blunt the physical stresses of labor.  Epidural anesthesia at the time of  may be more prudent than spinal to avoid the rapid hemodynamic changes sometimes seen after spinal.  Adequate prehydration in labor or before surgery.  Phenylephrine may be the agent of choice in cases of severe, acute hypotension on Labor and Birth, but this should be carefully titrated.  Florinef may be helpful in situations of recurrent episodes.

## 2025-03-27 DIAGNOSIS — G43.909 EPISODIC MIGRAINE: ICD-10-CM

## 2025-03-27 RX ORDER — UBROGEPANT 100 MG/1
TABLET ORAL
Qty: 10 TABLET | Refills: 6 | Status: CANCELLED | OUTPATIENT
Start: 2025-03-27

## 2025-03-28 ENCOUNTER — OFFICE VISIT (OUTPATIENT)
Dept: FAMILY MEDICINE | Facility: CLINIC | Age: 30
End: 2025-03-28
Payer: COMMERCIAL

## 2025-03-28 VITALS
SYSTOLIC BLOOD PRESSURE: 118 MMHG | TEMPERATURE: 98 F | BODY MASS INDEX: 23.14 KG/M2 | HEIGHT: 70 IN | HEART RATE: 73 BPM | WEIGHT: 161.63 LBS | DIASTOLIC BLOOD PRESSURE: 65 MMHG | OXYGEN SATURATION: 100 %

## 2025-03-28 DIAGNOSIS — Q79.60 EHLERS-DANLOS SYNDROME: ICD-10-CM

## 2025-03-28 DIAGNOSIS — G43.819 OTHER MIGRAINE WITHOUT STATUS MIGRAINOSUS, INTRACTABLE: ICD-10-CM

## 2025-03-28 DIAGNOSIS — Q79.60 EHLERS-DANLOS SYNDROME: Primary | ICD-10-CM

## 2025-03-28 DIAGNOSIS — G90.A POTS (POSTURAL ORTHOSTATIC TACHYCARDIA SYNDROME): ICD-10-CM

## 2025-03-28 DIAGNOSIS — Z00.00 WELL ADULT EXAM: Primary | ICD-10-CM

## 2025-03-28 DIAGNOSIS — G43.909 EPISODIC MIGRAINE: ICD-10-CM

## 2025-03-28 PROCEDURE — 99999 PR PBB SHADOW E&M-EST. PATIENT-LVL III: CPT | Mod: PBBFAC,,, | Performed by: FAMILY MEDICINE

## 2025-03-28 RX ORDER — UBROGEPANT 100 MG/1
TABLET ORAL
Qty: 10 TABLET | Refills: 6 | Status: SHIPPED | OUTPATIENT
Start: 2025-03-28

## 2025-03-28 NOTE — PROGRESS NOTES
"Subjective:       Patient ID: Valerie Cole is a 29 y.o. female.    Chief Complaint: Annual Exam    Patient here today for annual well adult exam.   Tries to eat a healthy diet.  Exercises regularly.    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  Lab work normal.   Never able to get in with [facility].  Has done PT with good results so far.     POTS: seeing cardiology.  On Metoprolol    Migraine:  on Nortriptyline daily for prevention.  Has Ubrelvy for as needed use.      Review of Systems   Constitutional:  Negative for activity change, appetite change, fatigue and fever.   Respiratory:  Negative for cough, shortness of breath and wheezing.    Cardiovascular:  Negative for chest pain and palpitations.   Gastrointestinal:  Negative for abdominal pain, constipation, diarrhea and nausea.   Skin:  Negative for pallor and rash.   Neurological:  Negative for dizziness, syncope, light-headedness and headaches.       Objective:      Vitals:    03/28/25 1021   BP: 118/65   Pulse: 73   Temp: 97.7 °F (36.5 °C)   SpO2: 100%   Weight: 73.3 kg (161 lb 9.6 oz)   Height: 5' 10" (1.778 m)      Physical Exam  Constitutional:       General: She is not in acute distress.  Cardiovascular:      Rate and Rhythm: Normal rate and regular rhythm.      Heart sounds: Normal heart sounds. No murmur heard.  Pulmonary:      Effort: Pulmonary effort is normal. No respiratory distress.      Breath sounds: Normal breath sounds. No wheezing, rhonchi or rales.   Skin:     General: Skin is warm and dry.   Neurological:      General: No focal deficit present.      Mental Status: She is alert.   Psychiatric:         Mood and Affect: Mood normal.         Behavior: Behavior normal.         " Thought Content: Thought content normal.         Results for orders placed or performed in visit on 03/14/25   Glucose, Fasting    Collection Time: 03/14/25  9:56 AM   Result Value Ref Range    Glucose, Fasting 98 70 - 110 mg/dL   Hemoglobin A1C    Collection Time: 03/14/25  9:56 AM   Result Value Ref Range    Hemoglobin A1C 5.1 4.0 - 5.6 %    Estimated Avg Glucose 100 68 - 131 mg/dL   TSH    Collection Time: 03/14/25  9:56 AM   Result Value Ref Range    TSH 1.076 0.400 - 4.000 uIU/mL   CBC Auto Differential    Collection Time: 03/14/25  9:56 AM   Result Value Ref Range    WBC 4.36 3.90 - 12.70 K/uL    RBC 4.34 4.00 - 5.40 M/uL    Hemoglobin 13.3 12.0 - 16.0 g/dL    Hematocrit 41.0 37.0 - 48.5 %    MCV 95 82 - 98 fL    MCH 30.6 27.0 - 31.0 pg    MCHC 32.4 32.0 - 36.0 g/dL    RDW 12.9 11.5 - 14.5 %    Platelets 240 150 - 450 K/uL    MPV 9.6 9.2 - 12.9 fL    Immature Granulocytes 0.2 0.0 - 0.5 %    Gran # (ANC) 1.9 1.8 - 7.7 K/uL    Immature Grans (Abs) 0.01 0.00 - 0.04 K/uL    Lymph # 2.0 1.0 - 4.8 K/uL    Mono # 0.4 0.3 - 1.0 K/uL    Eos # 0.0 0.0 - 0.5 K/uL    Baso # 0.02 0.00 - 0.20 K/uL    nRBC 0 0 /100 WBC    Gran % 42.9 38.0 - 73.0 %    Lymph % 46.3 18.0 - 48.0 %    Mono % 9.4 4.0 - 15.0 %    Eosinophil % 0.7 0.0 - 8.0 %    Basophil % 0.5 0.0 - 1.9 %    Differential Method Automated    Lipid Panel    Collection Time: 03/14/25  9:56 AM   Result Value Ref Range    Cholesterol 148 120 - 199 mg/dL    Triglycerides 68 30 - 150 mg/dL    HDL 53 40 - 75 mg/dL    LDL Cholesterol 81.4 63.0 - 159.0 mg/dL    HDL/Cholesterol Ratio 35.8 20.0 - 50.0 %    Total Cholesterol/HDL Ratio 2.8 2.0 - 5.0    Non-HDL Cholesterol 95 mg/dL   Varicella Zoster Antibody, IgG    Collection Time: 03/14/25  9:56 AM   Result Value Ref Range    Varicella Zoster IgG 11.300 S/CO    Varicella Interpretation Positive (A)    Rubella Antibody, IgG    Collection Time: 03/14/25  9:56 AM   Result Value Ref Range    Rubella IgG Antibodies 304.0 >=10.0  IU/mL    Rubella Immune Status Reactive    Hepatitis B Surface Antigen    Collection Time: 03/14/25  9:56 AM   Result Value Ref Range    Hepatitis B Surface Ag Non-reactive Non-reactive   Hepatitis C Antibody    Collection Time: 03/14/25  9:56 AM   Result Value Ref Range    Hepatitis C Ab Non-reactive Non-reactive   HIV 1/2 Ag/Ab (4th Gen)    Collection Time: 03/14/25  9:56 AM   Result Value Ref Range    HIV 1/2 Ag/Ab Non-reactive Non-reactive   Treponema Pallidium Antibodies IgG, IgM    Collection Time: 03/14/25  9:56 AM   Result Value Ref Range    Treponema Pallidum Antibodies (IgG, IgM) Nonreactive Nonreactive      Assessment:       1. Well adult exam    2. Kaelyn-Danlos syndrome    3. POTS (postural orthostatic tachycardia syndrome)    4. Other migraine without status migrainosus, intractable        Plan:       Well adult exam  Continue to work on dietary improvements (decrease overall calorie intake, decrease sugar and carb intake, decrease animal protein intake)  Continue to exercise at least 30-40 minutes, 3 times per week  Immunizations were discussed and were up to date  Preventative exams were discussed and up to date   Kaelyn-Danlos syndrome  She will continue to try to get in with [facility].  Discussed pregnancy may help.  Can do PT again in the future if needed.  POTS (postural orthostatic tachycardia syndrome)  Continue Toprol for now  Other migraine without status migrainosus, intractable  Continue Nortriptyline.     F/U in about 1 month    Visit today included increased complexity associated with the care of the episodic problem Migraines addressed and managing the longitudinal care of the patient due to the serious and/or complex managed problem(s) as above.       Medication List with Changes/Refills   Current Medications    ALBUTEROL (VENTOLIN HFA) 90 MCG/ACTUATION INHALER    Inhale 2 puffs into the lungs every 6 (six) hours as needed for Wheezing. Rescue    CETIRIZINE (ZYRTEC) 10 MG TABLET    Take 1  tablet (10 mg total) by mouth once daily.    METOPROLOL TARTRATE (LOPRESSOR) 25 MG TABLET    Take ONE-HALF tablet by mouth 2 (two) times daily.    MONTELUKAST (SINGULAIR) 10 MG TABLET    Take 1 tablet (10 mg total) by mouth every evening.    NORTRIPTYLINE (PAMELOR) 25 MG CAPSULE    Take 1 capsule (25 mg total) by mouth every evening.    ONDANSETRON (ZOFRAN-ODT) 4 MG TBDL    Take 1 tablet (4 mg total) by mouth every 8 (eight) hours as needed (nausea).   Changed and/or Refilled Medications    Modified Medication Previous Medication    UBROGEPANT (UBRELVY) 100 MG TABLET ubrogepant (UBRELVY) 100 mg tablet       Take 1 tablet by mouth at onset headache, second tablet 2 hours later if needed, no more than 2 tablets per day day/3 days use in week    Take 1 tablet by mouth at onset headache, second tablet 2 hours later if needed, no more than 2 tablets per day day/3 days use in week   Discontinued Medications    ALBUTEROL-BUDESONIDE (AIRSUPRA) 90-80 MCG/ACTUATION    Inhale 2 puffs into the lungs every 4 (four) hours as needed (asthma).

## 2025-05-06 ENCOUNTER — OFFICE VISIT (OUTPATIENT)
Dept: FAMILY MEDICINE | Facility: CLINIC | Age: 30
End: 2025-05-06
Payer: COMMERCIAL

## 2025-05-06 ENCOUNTER — RESULTS FOLLOW-UP (OUTPATIENT)
Dept: FAMILY MEDICINE | Facility: CLINIC | Age: 30
End: 2025-05-06

## 2025-05-06 VITALS
HEIGHT: 70 IN | SYSTOLIC BLOOD PRESSURE: 106 MMHG | DIASTOLIC BLOOD PRESSURE: 62 MMHG | HEART RATE: 84 BPM | OXYGEN SATURATION: 98 % | WEIGHT: 156.75 LBS | BODY MASS INDEX: 22.44 KG/M2 | TEMPERATURE: 98 F

## 2025-05-06 DIAGNOSIS — R53.83 FATIGUE, UNSPECIFIED TYPE: ICD-10-CM

## 2025-05-06 DIAGNOSIS — B34.9 VIRAL SYNDROME: Primary | ICD-10-CM

## 2025-05-06 LAB
BILIRUBIN, UA POC OHS: ABNORMAL
BLOOD, UA POC OHS: ABNORMAL
CLARITY, UA POC OHS: CLEAR
COLOR, UA POC OHS: YELLOW
CTP QC/QA: YES
GLUCOSE, UA POC OHS: NEGATIVE
KETONES, UA POC OHS: 40
LEUKOCYTES, UA POC OHS: NEGATIVE
NITRITE, UA POC OHS: NEGATIVE
PH, UA POC OHS: 6
POC MOLECULAR INFLUENZA A AGN: NEGATIVE
POC MOLECULAR INFLUENZA B AGN: NEGATIVE
PROTEIN, UA POC OHS: 30
SPECIFIC GRAVITY, UA POC OHS: >=1.03
UROBILINOGEN, UA POC OHS: 2

## 2025-05-06 PROCEDURE — 99999 PR PBB SHADOW E&M-EST. PATIENT-LVL IV: CPT | Mod: PBBFAC,,, | Performed by: FAMILY MEDICINE

## 2025-05-06 NOTE — PROGRESS NOTES
"Subjective:       Patient ID: Valerie Cole is a 29 y.o. female.    Chief Complaint: Feeling Weak (Rundown and achy. Has been off since Tuesday.)     Here today for an acute visit.  She is here today complaining of feeling "not well" for the past week.  Went home from work last Tuesday.  Felt okay on Wednesday/Thursday.  Went to [facility] on Friday and got overheated.  Had to spend some time in the medical tent.  Very tired on Sat., congestion on Sun.  Yesterday felt the worst.  Eyes burn, mild congestion, cough, nauseated, body aches, fatigued.  No fevers.    She notes that her wife has shingles.      Review of Systems   Constitutional:  Positive for fatigue. Negative for activity change, appetite change and fever.   HENT:  Positive for congestion.    Respiratory:  Positive for cough. Negative for shortness of breath and wheezing.    Cardiovascular:  Negative for chest pain and palpitations.   Gastrointestinal:  Negative for abdominal pain, constipation, diarrhea and nausea.   Neurological:  Positive for dizziness and light-headedness.       Objective:      Vitals:    05/06/25 1056   BP: 106/62   Pulse: 84   Temp: 97.5 °F (36.4 °C)   SpO2: 98%   Weight: 71.1 kg (156 lb 12 oz)   Height: 5' 10" (1.778 m)      Physical Exam  Constitutional:       General: She is not in acute distress.  Cardiovascular:      Rate and Rhythm: Normal rate and regular rhythm.      Heart sounds: Normal heart sounds. No murmur heard.  Pulmonary:      Effort: Pulmonary effort is normal. No respiratory distress.      Breath sounds: Normal breath sounds. No wheezing, rhonchi or rales.   Skin:     General: Skin is warm and dry.   Neurological:      General: No focal deficit present.      Mental Status: She is alert.   Psychiatric:         Mood and Affect: Mood normal.         Behavior: Behavior normal.         Thought Content: Thought content normal.            Results for orders placed or performed in visit on 05/06/25   POCT Influenza A/B " Molecular    Collection Time: 05/06/25 12:04 PM   Result Value Ref Range    POC Molecular Influenza A Ag Negative Negative    POC Molecular Influenza B Ag Negative Negative     Acceptable Yes    POCT Urinalysis(Instrument)    Collection Time: 05/06/25 12:05 PM   Result Value Ref Range    Color, POC UA Yellow (A) Yellow, Straw, Colorless    Clarity, POC UA Clear Clear    Glucose, POC UA Negative Negative    Bilirubin, POC UA Moderate (A) Negative    Ketones, POC UA 40 (A) Negative    Spec Grav POC UA >=1.030 1.005 - 1.030    Blood, POC UA Trace-lysed (A) Negative    pH, POC UA 6.0 5.0 - 8.0    Protein, POC UA 30 (A) Negative    Urobilinogen, POC UA 2.0 (A) <=1.0    Nitrite, POC UA Negative Negative    WBC, POC UA Negative Negative      Assessment:       1. Viral syndrome    2. Fatigue, unspecified type        Plan:       Viral syndrome  -     POCT Influenza A/B Molecular    Fatigue, unspecified type  -     POCT Urinalysis(Instrument)    Flu and UA are normal.  Decided not to repeat blood work at this time.  Hydrate and rest.  F/U if no improvement      Medication List with Changes/Refills   Current Medications    ALBUTEROL (VENTOLIN HFA) 90 MCG/ACTUATION INHALER    Inhale 2 puffs into the lungs every 6 (six) hours as needed for Wheezing. Rescue    CETIRIZINE (ZYRTEC) 10 MG TABLET    Take 1 tablet (10 mg total) by mouth once daily.    METOPROLOL TARTRATE (LOPRESSOR) 25 MG TABLET    Take ONE-HALF tablet by mouth 2 (two) times daily.    MONTELUKAST (SINGULAIR) 10 MG TABLET    Take 1 tablet (10 mg total) by mouth every evening.    NORTRIPTYLINE (PAMELOR) 25 MG CAPSULE    Take 1 capsule (25 mg total) by mouth every evening.    ONDANSETRON (ZOFRAN-ODT) 4 MG TBDL    Take 1 tablet (4 mg total) by mouth every 8 (eight) hours as needed (nausea).    UBROGEPANT (UBRELVY) 100 MG TABLET    Take 1 tablet by mouth at onset headache, second tablet 2 hours later if needed, no more than 2 tablets per day day/3 days use  in week

## 2025-05-18 ENCOUNTER — PATIENT MESSAGE (OUTPATIENT)
Dept: NEUROLOGY | Facility: CLINIC | Age: 30
End: 2025-05-18
Payer: COMMERCIAL

## 2025-05-19 RX ORDER — NORTRIPTYLINE HYDROCHLORIDE 10 MG/1
CAPSULE ORAL
Qty: 15 CAPSULE | Refills: 0 | Status: SHIPPED | OUTPATIENT
Start: 2025-05-19

## 2025-06-02 RX ORDER — NORTRIPTYLINE HYDROCHLORIDE 10 MG/1
CAPSULE ORAL
Qty: 15 CAPSULE | Refills: 0 | OUTPATIENT
Start: 2025-06-02

## 2025-06-03 ENCOUNTER — PATIENT MESSAGE (OUTPATIENT)
Dept: NEUROLOGY | Facility: CLINIC | Age: 30
End: 2025-06-03
Payer: COMMERCIAL

## 2025-06-09 ENCOUNTER — OFFICE VISIT (OUTPATIENT)
Dept: CARDIOLOGY | Facility: CLINIC | Age: 30
End: 2025-06-09
Payer: COMMERCIAL

## 2025-06-09 VITALS
WEIGHT: 161.63 LBS | BODY MASS INDEX: 23.19 KG/M2 | HEART RATE: 72 BPM | SYSTOLIC BLOOD PRESSURE: 106 MMHG | DIASTOLIC BLOOD PRESSURE: 70 MMHG

## 2025-06-09 DIAGNOSIS — G90.A POTS (POSTURAL ORTHOSTATIC TACHYCARDIA SYNDROME): Primary | ICD-10-CM

## 2025-06-09 PROCEDURE — 99999 PR PBB SHADOW E&M-EST. PATIENT-LVL III: CPT | Mod: PBBFAC,,, | Performed by: INTERNAL MEDICINE

## 2025-06-09 PROCEDURE — 99213 OFFICE O/P EST LOW 20 MIN: CPT | Mod: PBBFAC | Performed by: INTERNAL MEDICINE

## 2025-06-09 PROCEDURE — 99214 OFFICE O/P EST MOD 30 MIN: CPT | Mod: S$PBB,,, | Performed by: INTERNAL MEDICINE

## 2025-06-09 NOTE — PROGRESS NOTES
HISTORY:    30 yo F w a h/o hypermobility/POTS presenting for initial evaluation by me.    Seen by Dr. Faith Pederson earlier this year and diagnosed with POTS.    Notes a long h/o intermittent elevated heart rates. Heart rates go upto 160-170s with light exercise or doing certain activities. Sometimes does not have that response. Occasionally has orthostatic symptoms. Symptoms do bother her, but she has learned to live with them. Pre-syncope without a h/o syncope. Symptoms present since high school.    Has been aggressive w fluid intake, increased salt intake, electrolytes.     After initial evaluation, we started metoprolol 12.5x2. Feels that this has improved symptoms.     Able to function at a high level. 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      The patient denies any previous history of myocardial infarction, coronary artery disease, peripheral arterial disease, stroke, congestive heart failure, or cardiomyopathy.      PHYSICAL EXAM:    Vitals:    06/09/25 1401   BP: 106/70   Pulse: 72       NAD, A+Ox3.  No jvd, no bruit.  RRR nml s1,s2. No murmurs.  CTA B no wheezes or crackles.  No edema.    LABS/STUDIES (imaging reviewed during clinic visit):    March 2025 CBC and 2023 CMP normal.  2025 A1c and TSH normal.  2025 /HDL 53/LDL 81/TG 68.  2022 /HDL 57/LDL 88/TG 46.    ECG October 2024 demonstrates sinus rhythm with no Q-waves or ST changes.    Holter April 2024 demonstrates sinus rhythm no evidence of ectopy or arrhythmia.  Average heart rate 81.  Multiple episodes of palpitations, lightheadedness, dizziness, shortness of breath all corresponded with sinus rhythm.  Sometimes symptoms correspond with sinus tach. Heart rates are elevated at times as described by history.  TTE October 2024 normal LV size and function with EF 60 65%.  Normal diastology.  CVP 3.    ASSESSMENT & PLAN:    1. POTS (postural orthostatic tachycardia syndrome)                  Pt with POTS. Doing well with 1 galloon  water intake/day and 8-12 gm salt. Compression stockings as tolerated. Exercising as much as she can.    Tolerating metoprolol 12.5x2 and functioning at a high level.    Pt planning to get pregnant this summer. She has no CV hx and POTS does not put her at higher risk. She has a diagnosis of EDS. Saw genetics and has been told she doesn't have the genetic marker for vascular disease. Normal TTE.      Discussed pros and cons of metoprolol tx carson-partum. I recommend stopping, even though risk of fetus is low. Potential benefits would be subjective related to POTS symptoms. Patient to trial off. She will recah out if she feels that the benefit of betablocker therapy is greater.     Follow-up as needed.     Roseanne Chopra MD

## 2025-06-20 ENCOUNTER — OFFICE VISIT (OUTPATIENT)
Dept: NEUROLOGY | Facility: CLINIC | Age: 30
End: 2025-06-20
Payer: COMMERCIAL

## 2025-06-20 VITALS
DIASTOLIC BLOOD PRESSURE: 85 MMHG | BODY MASS INDEX: 22.87 KG/M2 | HEIGHT: 70 IN | RESPIRATION RATE: 19 BRPM | HEART RATE: 59 BPM | SYSTOLIC BLOOD PRESSURE: 123 MMHG | WEIGHT: 159.75 LBS

## 2025-06-20 DIAGNOSIS — G43.909 EPISODIC MIGRAINE: Primary | ICD-10-CM

## 2025-06-20 DIAGNOSIS — J34.89 SINUS PAIN: ICD-10-CM

## 2025-06-20 PROCEDURE — 99999 PR PBB SHADOW E&M-EST. PATIENT-LVL III: CPT | Mod: PBBFAC,,, | Performed by: PHYSICIAN ASSISTANT

## 2025-06-20 NOTE — PATIENT INSTRUCTIONS
"Suggested that the patient research out/speak to reproductive specialist about the aforementioned OTC supplements (stressed NOT FDA regulated and should take at own risk).    To help prevent a headache:      Vitamin B2 (riboflavin)  CoQ10  Magnesium  "Dolovent" which is magnesium/Vitamin B2/coq10     We did discuss trigeminal nerve block I use marcaine, this is NOT botox         Consider the cefaly device, www.cefaly.com please research, this is TENs unit designed in [place] and was FDA approved in the early 2010s for migraines.     "

## 2025-06-20 NOTE — PROGRESS NOTES
"  [facility]  [address] [facility] [address]  [place] [address]  Phone:[phone]  Fax: [phone]  Follow up visit     Patient Name: Valerie Cole  : 1995  MRN:  [id number]  Today: 2025   LOV:  2024  chief complaint: Headache  Approx 33 mins in discussion along with charting   PCP: Christoph Watson MD.       Assessment:   Valerie Cole is a 29 y.o. right handed female with a PMHx of: asthma, GERD, and nephrolithiasis   whom presents with her partner in follow up. HA historically migrainous (off medications d/t pending reproductive assistance in growing her family). Presently more "sinus" related.     Review:    ICD-10-CM ICD-9-CM   1. Episodic migraine  G43.909 346.90   2. Sinus pain  J34.89 478.19                Plan:        For HA Prevention:  Consider the cefaly device, www.cefaly.com, please research, this is TENs unit designed in [place] and was FDA approved in the early s for migraines. Stop if pregnant  Gave list of OTC supplements to research, discussed not FDA regulated, take at own risk/Run past her reproductive specialist, she agreed          For HA :  OTC, avoid NSAIDs if pregnant     To break up Headaches:  We discussed TNB, for now, would like to hold off       Other:  N/A         All test results as well as any necessary instructions were reviewed and discussed with patient.    Review:                 Patient to return to PCP/other specialists for all other problems  Patient to continue on all medications as Rx'd  Full office note available online   RTO-PRN   The patient indicates understanding of these issues and agrees to the plan.    HPI:   Valerie Cole is a 29 y.o.right handed, female with a PMHx of: asthma, GERD, and nephrolithiasis   whom presents with her partner in follow up    At LOV (2024):pamelor, ubrelvy, zofran an deltasone.   3 HD a week, feels it could be sinuses  Has come off all " "medicines, preparing for reproductive help  Pain along frontalis/cheeks, R>L  Followed by cardiology for dysautonomia/arrhythmia, on BB   No pain in office              At last visit (5/2024): elavil at 25 mg, ubrelvy and zofran.   Two weeks of HA, attributes to stress/missing meds/dehydration (spouse has been sick)   Pain along frontalis/occipitalis  Notices pain is all day, but worsening pain in evening  Ubrelvy "takes edge off" but doesn't take away the HA   No steroid use recently, no GIB at present   Having brain fog, STM changes more noticeable, feels it may be d/t to the elavil (has been sleeping better and appreciates that she has an appetite)         -no trouble with ADLS (e.g., dressing, eating, driving, bank account management)  Has HA in office       From previous visit: ellavil at 25 mg and ubrelvy.   2-3 migraine days a month, dehydration can be a trigger and maybe a small HA (1-2 days) out of a month   Intensity come down with HA   Ubrelvy is abortive, no side effects  No side effects from elavil but feels a bit more sluggish in morning, and at times feels mental acuity is decreased  Pain is retrorbital (OD>OS), or can be along frontalis   Last HA: 1 week ago   She would like a refill of zofran for her nausea that she gets with migraines   [place] as aside liked new rheumatologist  Has seen cardiology, awaiting results of recent visit (having tachycardia), she is concerned she has POTS       -states will have light headedness after standing for a while, and notes to have palpitations  "Just standing there."She asks to discuss POTS more.         At last visit: elavil and prednisone written, has nurtec from PCP.   8 HD in past month, pain stays about 5/10 on average ( noted 2 may have been a bit higher)   Last mild HA right now, feels may be d/t sinuses   Pain retro orbital (R>L and then moves to ipsilateral forehead)  Elavil at 20 mg no side effects   Steroids had delayed benefit, no side effects "   Mentions multi location joint pain, hyper mobility, hx of ulcers, GI issues, and numbness that migrates. Has seen rheum, been referred to PT, finds it not helping and would like second opinion.     HA Historically:   Start:HA in past (2 years ago) but were episodic (1 every few months), but became more prevalent in past 8-9 months  and in past 1-2 months more regularly, no triggering nidus per patient/fiance.   History of trauma (no), History of CNS infection (no), History of Stroke (no)  Location:right retroobital  and right temporal, however rarely on the left   Severity: range: 1-6/10  Duration:4+ hours   Frequency:15 HD in past month  Aura: vision change/blurred vision (infrequent)   Associated factors (bolded positive) WITH HA ( or migraine): Nausea, vomiting, photophobia, numbness in facie (rare), phonophobia, tinnitus, scalp pain, vision loss, diplopia, scintillations, eye pain, jaw pain, clumsiness (dropping things-?),  weakness?    Tried:ibuprofen   Triggers (in bold): stress, lack of sleep, too much caffeine, too little caffeine, weather change, seasonal change, strong odours, bright lights, sunlight, food , not a trigger but patient states she does miss meals frequently   Currently having a HA?:yes   Positives in bold: Hx of Kidney Stones, asthma, GI bleed, osteoporosis, CAD/MI, CVA/TIA, DM    Imaging on file: MRI Brain (non contrasted) 11/9/2023-NML  Therapies tried in past: (failures to be marked, if known---why did it fail?)  Zofran  Toradol  Lexapro  Imitrex  Nurtec   Elavil  Prednisone  Ubrelvy   Pamelor      Medication Reconciliation:   Current Outpatient Medications   Medication Sig Dispense Refill    albuterol (VENTOLIN HFA) 90 mcg/actuation inhaler Inhale 2 puffs into the lungs every 6 (six) hours as needed for Wheezing. Rescue 18 g 0    cetirizine (ZYRTEC) 10 MG tablet Take 1 tablet (10 mg total) by mouth once daily. 30 tablet 0    montelukast (SINGULAIR) 10 mg tablet Take 1 tablet (10 mg  total) by mouth every evening. 30 tablet 11    nortriptyline (PAMELOR) 10 MG capsule Taper schedule (take 2 hours before bed): 2 pills for 5 days then 1 pill for 5 days then off 15 capsule 0    ondansetron (ZOFRAN-ODT) 4 MG TbDL Take 1 tablet (4 mg total) by mouth every 8 (eight) hours as needed (nausea). 20 tablet 3    ubrogepant (UBRELVY) 100 mg tablet Take 1 tablet by mouth at onset headache, second tablet 2 hours later if needed, no more than 2 tablets per day day/3 days use in week 10 tablet 6     No current facility-administered medications for this visit.     Review of patient's allergies indicates:  No Known Allergies    PMHx:  Past Medical History:   Diagnosis Date    Asthma     Kaelyn-Danlos syndrome     GERD (gastroesophageal reflux disease)     Headache     Kidney stones     POTS (postural orthostatic tachycardia syndrome)      No past surgical history on file.    Fhx:  Family History   Problem Relation Name Age of Onset    Hypertension Father      Arthritis Mother      Hypertension Mother      Hypertension Maternal Aunt      Hypertension Maternal Uncle      Colon cancer Neg Hx      Uterine cancer Neg Hx      Breast cancer Neg Hx      Ovarian cancer Neg Hx         Shx:   Social History     Socioeconomic History    Marital status:    Tobacco Use    Smoking status: Never    Smokeless tobacco: Never   Substance and Sexual Activity    Alcohol use: No    Drug use: Never    Sexual activity: Yes     Partners: Female     Birth control/protection: OCP     Social Drivers of Health     Financial Resource Strain: Patient Declined (12/18/2024)    Overall Financial Resource Strain (CARDIA)     Difficulty of Paying Living Expenses: Patient declined   Food Insecurity: Patient Declined (12/18/2024)    Hunger Vital Sign     Worried About Running Out of Food in the Last Year: Patient declined     Ran Out of Food in the Last Year: Patient declined   Transportation Needs: No Transportation Needs (11/9/2023)    PAMELA  - Transportation     Lack of Transportation (Medical): No     Lack of Transportation (Non-Medical): No   Physical Activity: Sufficiently Active (12/18/2024)    Exercise Vital Sign     Days of Exercise per Week: 3 days     Minutes of Exercise per Session: 60 min   Stress: No Stress Concern Present (12/18/2024)    [employer] - Occupational Stress Questionnaire     Feeling of Stress : Not at all   Housing Stability: Unknown (12/18/2024)    Housing Stability Vital Sign     Unable to Pay for Housing in the Last Year: Patient declined           Labs:   CMP  Sodium   Date Value Ref Range Status   11/09/2023 140 136 - 145 mmol/L Final     Potassium   Date Value Ref Range Status   11/09/2023 3.9 3.5 - 5.1 mmol/L Final     Chloride   Date Value Ref Range Status   11/09/2023 106 95 - 110 mmol/L Final     CO2   Date Value Ref Range Status   11/09/2023 25 23 - 29 mmol/L Final     Glucose   Date Value Ref Range Status   11/09/2023 96 70 - 110 mg/dL Final     BUN   Date Value Ref Range Status   11/09/2023 8 6 - 20 mg/dL Final     Creatinine   Date Value Ref Range Status   11/09/2023 0.9 0.5 - 1.4 mg/dL Final     Calcium   Date Value Ref Range Status   11/09/2023 9.3 8.7 - 10.5 mg/dL Final     Total Protein   Date Value Ref Range Status   11/09/2023 7.0 6.0 - 8.4 g/dL Final     Albumin   Date Value Ref Range Status   11/09/2023 4.3 3.5 - 5.2 g/dL Final     Total Bilirubin   Date Value Ref Range Status   11/09/2023 0.8 0.1 - 1.0 mg/dL Final     Comment:     For infants and newborns, interpretation of results should be based  on gestational age, weight and in agreement with clinical  observations.    Premature Infant recommended reference ranges:  Up to 24 hours.............<8.0 mg/dL  Up to 48 hours............<12.0 mg/dL  3-5 days..................<15.0 mg/dL  6-29 days.................<15.0 mg/dL       Alkaline Phosphatase   Date Value Ref Range Status   11/09/2023 58 55 - 135 U/L Final     AST   Date  Value Ref Range Status   2023 26 10 - 40 U/L Final     ALT   Date Value Ref Range Status   2023 29 10 - 44 U/L Final     Anion Gap   Date Value Ref Range Status   2023 9 8 - 16 mmol/L Final     eGFR   Date Value Ref Range Status   2023 >60 >60 mL/min/1.73 m^2 Final     Lab Results   Component Value Date    WBC 4.36 2025    HGB 13.3 2025    HCT 41.0 2025    MCV 95 2025     2025       Lab Results   Component Value Date    TSH 1.076 2025         Imagin2023 MRI Brain (report): Clinical history is headache for 7 days, blurry vision right eye     Multiplanar images of the brain were obtained.     The ventricles and sulci are normal in size and configuration for age. There is no hemorrhage, mass or midline shift. There are no extra-axial fluid collections.  There is normal signal within the white matter on all sequences. There is no abnormality on the diffusion-weighted images to suggest acute infarct. Cerebellum and brainstem are normal.  There are flow voids in the cavernous portions of the internal carotid arteries and basilar artery indicating patency. The corpus callosum, cerebellar tonsils, midline structures and orbits are normal.     IMPRESSION: Negative MRI of the brain      Other testing:  Reviewed in chart     Note: I have independently reviewed any/all imaging/labs/tests and agree with the report (s) as documented.  Any discrepancies will be as noted/demarcated by free text.  JOE ZAVALA 2025                     ROS:   Review Of Systems (questions asked, positive or additions in BOLD)  Gen: Weight change, fatigue/malaise, pyrexia   HEENT: Tinnitus, headache,  blurred vision, eye pain, diplopia, photophobia,  nose bleeds, congestion, sore throat, jaw pain, scalp pain, neck stiffness   Card: Palpitations, CP   Pulm: SOB, cough   Vas: Easy bruising, easy bleeding   GI: N/V/D/C, incontinence, hematemesis, hematochezia    :  "incontinence, hematuria   Endocrine: Temp intolerance, polyuria, polydipsia   M/S: Neck pain, myalgia, back pain, joint pain, falls    Neuro: PER HPI   PSY: Memory loss, confusion, depression, anxiety, trouble in sleep, hallucinations          EXAM:   Remains consistent   /85   Pulse (!) 59   Resp 19   Ht 5' 10" (1.778 m)   Wt 72.4 kg (159 lb 11.6 oz)   BMI 22.92 kg/m²    GEN:  NAD  HEENT: NC/AT, frontalis/temporalis/occipitalis all NTTP   EXTREM:    no edema present.    NEURO:  Mental Status:  Awake, alert and appropriately oriented to time, place, and person.  Normal attention and concentration.  Speech is fluent and appropriate language function (I.e., comprehension).     Cranial Nerves:      Extraocular movements are intact and without nystagmus.  Visual fields are full to confrontation testing.    Facial movement is symmetric.  Hearing is normal. Uvula in midline. FROM of neck in all (6) directions, shoulder shrug symmetrical. Tongue in midline without fasiculation.     Motor:  antigravity in all limbs  Tremor/pronator drift not apparent.     DTR: 2+ bilat patellar     Gait and Stance: Normal manner of stance and gait function testing.           This document has been electronically signed by Mr. Haider Dumont MPA, PA-C on 6/20/2025, I have personally typed this message using the EMR.       Dr Jesus MD was available during today's visit.     Personal Protective Equipment:    Personal Protective Equipment was used during this encounter including;    non latex gloves.       CC: Christoph Watson MD                   "

## 2025-06-24 ENCOUNTER — TELEPHONE (OUTPATIENT)
Dept: OBSTETRICS AND GYNECOLOGY | Facility: CLINIC | Age: 30
End: 2025-06-24
Payer: COMMERCIAL

## 2025-06-24 NOTE — TELEPHONE ENCOUNTER
Copied from CRM #[id number]. Topic: General Inquiry - Patient Advice  >> Jun 24, 2025  1:00 PM Jordon wrote:  Type: Needs Medical Advice  Who Called:  Patient    Best Call Back Number: [phone]    Additional Information: Called to speak with office regarding HSG results revealing polyps. Called to discuss, see if US can be ordered at [facility]. Please call      Returned pt call and got pt scheduled to come in for . pt understood on time date and provider.

## 2025-06-28 ENCOUNTER — HOSPITAL ENCOUNTER (EMERGENCY)
Facility: HOSPITAL | Age: 30
Discharge: HOME | End: 2025-06-28
Attending: EMERGENCY MEDICINE
Payer: COMMERCIAL

## 2025-06-28 VITALS
DIASTOLIC BLOOD PRESSURE: 67 MMHG | SYSTOLIC BLOOD PRESSURE: 110 MMHG | BODY MASS INDEX: 22.9 KG/M2 | TEMPERATURE: 99 F | OXYGEN SATURATION: 100 % | RESPIRATION RATE: 19 BRPM | WEIGHT: 160 LBS | HEIGHT: 70 IN | HEART RATE: 61 BPM

## 2025-06-28 DIAGNOSIS — R00.1 BRADYCARDIA, SINUS: ICD-10-CM

## 2025-06-28 DIAGNOSIS — R53.1 GENERALIZED WEAKNESS: Primary | ICD-10-CM

## 2025-06-28 LAB
ABSOLUTE EOSINOPHIL (SMH): 0.04 K/UL
ABSOLUTE MONOCYTE (SMH): 0.3 K/UL (ref 0.3–1)
ABSOLUTE NEUTROPHIL COUNT (SMH): 2 K/UL (ref 1.8–7.7)
ALBUMIN SERPL-MCNC: 4.4 G/DL (ref 3.5–5.2)
ALP SERPL-CCNC: 60 UNIT/L (ref 55–135)
ALT SERPL-CCNC: 19 UNIT/L (ref 10–44)
ANION GAP (SMH): 4 MMOL/L (ref 8–16)
AST SERPL-CCNC: 17 UNIT/L (ref 10–40)
BASOPHILS # BLD AUTO: 0.03 K/UL
BASOPHILS NFR BLD AUTO: 0.7 %
BILIRUB SERPL-MCNC: 0.4 MG/DL (ref 0.1–1)
BNP SERPL-MCNC: 44 PG/ML
BUN SERPL-MCNC: 12 MG/DL (ref 6–20)
CALCIUM SERPL-MCNC: 9.8 MG/DL (ref 8.7–10.5)
CHLORIDE SERPL-SCNC: 108 MMOL/L (ref 95–110)
CO2 SERPL-SCNC: 29 MMOL/L (ref 23–29)
CREAT SERPL-MCNC: 0.9 MG/DL (ref 0.5–1.4)
ERYTHROCYTE [DISTWIDTH] IN BLOOD BY AUTOMATED COUNT: 12.8 % (ref 11.5–14.5)
GFR SERPLBLD CREATININE-BSD FMLA CKD-EPI: >60 ML/MIN/1.73/M2
GLUCOSE SERPL-MCNC: 87 MG/DL (ref 70–110)
HCT VFR BLD AUTO: 38.2 % (ref 37–48.5)
HETEROPH AB SERPL QL IA: NEGATIVE
HGB BLD-MCNC: 12.7 GM/DL (ref 12–16)
IMM GRANULOCYTES # BLD AUTO: 0.01 K/UL (ref 0–0.04)
IMM GRANULOCYTES NFR BLD AUTO: 0.2 % (ref 0–0.5)
LYMPHOCYTES # BLD AUTO: 2.23 K/UL (ref 1–4.8)
MAGNESIUM SERPL-MCNC: 2.1 MG/DL (ref 1.6–2.6)
MCH RBC QN AUTO: 31.7 PG (ref 27–31)
MCHC RBC AUTO-ENTMCNC: 33.2 G/DL (ref 32–36)
MCV RBC AUTO: 95 FL (ref 82–98)
NUCLEATED RBC (/100WBC) (SMH): 0 /100 WBC
PLATELET # BLD AUTO: 210 K/UL (ref 150–450)
PMV BLD AUTO: 9.4 FL (ref 9.2–12.9)
POTASSIUM SERPL-SCNC: 3.8 MMOL/L (ref 3.5–5.1)
PROT SERPL-MCNC: 7.2 GM/DL (ref 6–8.4)
RBC # BLD AUTO: 4.01 M/UL (ref 4–5.4)
RELATIVE EOSINOPHIL (SMH): 0.9 % (ref 0–8)
RELATIVE LYMPHOCYTE (SMH): 48.9 % (ref 18–48)
RELATIVE MONOCYTE (SMH): 6.6 % (ref 4–15)
RELATIVE NEUTROPHIL (SMH): 42.7 % (ref 38–73)
SODIUM SERPL-SCNC: 141 MMOL/L (ref 136–145)
TROPONIN HIGH SENSITIVE (SMH): <2.3 PG/ML
TSH SERPL-ACNC: 1.64 UIU/ML (ref 0.34–5.6)
WBC # BLD AUTO: 4.56 K/UL (ref 3.9–12.7)

## 2025-06-28 PROCEDURE — 86308 HETEROPHILE ANTIBODY SCREEN: CPT | Performed by: EMERGENCY MEDICINE

## 2025-06-28 PROCEDURE — 84484 ASSAY OF TROPONIN QUANT: CPT | Performed by: EMERGENCY MEDICINE

## 2025-06-28 PROCEDURE — 93010 ELECTROCARDIOGRAM REPORT: CPT | Mod: ,,, | Performed by: INTERNAL MEDICINE

## 2025-06-28 PROCEDURE — 25000003 PHARM REV CODE 250: Performed by: EMERGENCY MEDICINE

## 2025-06-28 PROCEDURE — 93005 ELECTROCARDIOGRAM TRACING: CPT | Performed by: INTERNAL MEDICINE

## 2025-06-28 PROCEDURE — 63600175 PHARM REV CODE 636 W HCPCS: Performed by: EMERGENCY MEDICINE

## 2025-06-28 PROCEDURE — 85025 COMPLETE CBC W/AUTO DIFF WBC: CPT | Performed by: EMERGENCY MEDICINE

## 2025-06-28 PROCEDURE — 82565 ASSAY OF CREATININE: CPT | Performed by: EMERGENCY MEDICINE

## 2025-06-28 PROCEDURE — 83735 ASSAY OF MAGNESIUM: CPT | Performed by: EMERGENCY MEDICINE

## 2025-06-28 PROCEDURE — 83880 ASSAY OF NATRIURETIC PEPTIDE: CPT | Performed by: EMERGENCY MEDICINE

## 2025-06-28 PROCEDURE — 84443 ASSAY THYROID STIM HORMONE: CPT | Performed by: EMERGENCY MEDICINE

## 2025-06-28 PROCEDURE — 96360 HYDRATION IV INFUSION INIT: CPT

## 2025-06-28 PROCEDURE — 99285 EMERGENCY DEPT VISIT HI MDM: CPT | Mod: 25

## 2025-06-28 RX ADMIN — POTASSIUM BICARBONATE 20 MEQ: 782 TABLET, EFFERVESCENT ORAL at 06:06

## 2025-06-28 RX ADMIN — SODIUM CHLORIDE, POTASSIUM CHLORIDE, SODIUM LACTATE AND CALCIUM CHLORIDE 1000 ML: 600; 310; 30; 20 INJECTION, SOLUTION INTRAVENOUS at 03:06

## 2025-06-28 NOTE — DISCHARGE INSTRUCTIONS
Please hold your beta blocker for next 24 hours.  Once he resume your dose begin at either 12.5 mg daily or 6.25 mg twice daily.  Follow up with your cardiologist next week.  Return to emergency department if problems persist worsens or additional concerns.

## 2025-06-28 NOTE — ED PROVIDER NOTES
Encounter Date: 6/28/2025       History     Chief Complaint   Patient presents with    Fatigue     X 2 days with bradycardia     29-year-old female with history of asthma, Kaelyn-Danlos syndrome, gastroesophageal reflux, postural orthostatic tachycardic syndrome, currently on metoprolol 12.5 mg b.i.d..  Patient presents emergency department with complaint of fatigue over last 2 days with associated bradycardia.  States heart rates has been in the 40s.  She denies any recent illness.      Review of patient's allergies indicates:  No Known Allergies  Past Medical History:   Diagnosis Date    Asthma     Kaelyn-Danlos syndrome     GERD (gastroesophageal reflux disease)     Headache     Kidney stones     POTS (postural orthostatic tachycardia syndrome)      No past surgical history on file.  Family History   Problem Relation Name Age of Onset    Hypertension Father      Arthritis Mother      Hypertension Mother      Hypertension Maternal Aunt      Hypertension Maternal Uncle      Colon cancer Neg Hx      Uterine cancer Neg Hx      Breast cancer Neg Hx      Ovarian cancer Neg Hx       Social History[1]  Review of Systems   Constitutional:  Positive for fatigue. Negative for fever.   HENT:  Negative for sore throat.    Respiratory:  Negative for shortness of breath.    Cardiovascular:  Negative for chest pain, palpitations and leg swelling.   Gastrointestinal:  Negative for abdominal pain, nausea and vomiting.   Genitourinary:  Negative for dysuria.   Musculoskeletal:  Negative for back pain.   Skin:  Negative for rash.   Neurological:  Positive for weakness. Negative for dizziness, light-headedness and headaches.   Hematological:  Does not bruise/bleed easily.       Physical Exam     Initial Vitals [06/28/25 1417]   BP Pulse Resp Temp SpO2   133/89 (!) 47 18 97.6 °F (36.4 °C) 97 %      MAP       --         Physical Exam    Nursing note and vitals reviewed.  Constitutional: She appears well-developed and well-nourished.    HENT:   Head: Normocephalic and atraumatic.   Nose: Nose normal. Mouth/Throat: Oropharynx is clear and moist.   Eyes: Conjunctivae and EOM are normal. Pupils are equal, round, and reactive to light.   Neck: Neck supple. No thyromegaly present. No tracheal deviation present.   Normal range of motion.  Cardiovascular:  Normal rate, regular rhythm, normal heart sounds and intact distal pulses.     Exam reveals no gallop and no friction rub.       No murmur heard.  Pulmonary/Chest: No stridor. No respiratory distress.   Course bilateral breath sounds, no adventitious sounds   Abdominal: Abdomen is soft. Bowel sounds are normal. She exhibits no mass. There is no rebound and no guarding.   Musculoskeletal:         General: No edema. Normal range of motion.      Cervical back: Normal range of motion and neck supple.     Lymphadenopathy:     She has no cervical adenopathy.   Neurological: She is alert and oriented to person, place, and time. She has normal strength and normal reflexes. GCS score is 15. GCS eye subscore is 4. GCS verbal subscore is 5. GCS motor subscore is 6.   Skin: Skin is warm and dry. Capillary refill takes less than 2 seconds.   Psychiatric: She has a normal mood and affect.         ED Course   Procedures  Labs Reviewed   COMPREHENSIVE METABOLIC PANEL - Abnormal       Result Value    Sodium 141      Potassium 3.8      Chloride 108      CO2 29      Glucose 87      BUN 12      Creatinine 0.9      Calcium 9.8      Protein Total 7.2      Albumin 4.4      Bilirubin Total 0.4      ALP 60      AST 17      ALT 19      Anion Gap 4 (*)     eGFR >60     CBC WITH DIFFERENTIAL - Abnormal    WBC 4.56      RBC 4.01      Hgb 12.7      Hct 38.2      MCV 95      MCH 31.7 (*)     MCHC 33.2      RDW 12.8      Platelet Count 210      MPV 9.4      Nucleated RBC 0      Neut % 42.7      Lymph % 48.9 (*)     Mono % 6.6      Eos % 0.9      Basophil % 0.7      Imm Grans % 0.2      Neut # 2.0      Lymph # 2.23      Mono # 0.30       Eos # 0.04      Baso # 0.03      Imm Grans # 0.01     MAGNESIUM - Normal    Magnesium 2.1     TROPONIN I HIGH SENSITIVITY - Normal    Troponin High Sensitive <2.3     B-TYPE NATRIURETIC PEPTIDE - Normal    BNP 44     TSH - Normal    TSH 1.635     HETEROPHILE AB SCREEN - Normal    Mono, Immunochomatopraphic IM Heterophile Antibody Negative     CBC W/ AUTO DIFFERENTIAL    Narrative:     The following orders were created for panel order CBC auto differential.  Procedure                               Abnormality         Status                     ---------                               -----------         ------                     CBC with Differential[[id number]]       Abnormal            Final result                 Please view results for these tests on the individual orders.        ECG Results              EKG 12-lead (In process)        Collection Time Result Time QRS Duration [facility] QTC Calculation    06/28/25 14:44:12 06/28/25 15:15:44 74 393                     In process by Interface, Lab In [facility] (06/28/25 15:15:46)                   Narrative:    Test Reason : R07.9,    Vent. Rate :  44 BPM     Atrial Rate :  44 BPM     P-R Int : 116 ms          QRS Dur :  74 ms      QT Int : 460 ms       P-R-T Axes :  44  77  47 degrees    QTcB Int : 393 ms    Marked sinus bradycardia  Abnormal ECG  When compared with ECG of 10-Oct-2024 08:19,  Vent. rate has decreased by  29 bpm    Referred By:            Confirmed By:                                   Imaging Results              X-Ray Chest AP Portable (Final result)  Result time 06/28/25 15:36:18      Final result by Emre Spence MD (06/28/25 15:36:18)                   Impression:      No acute cardiac or pulmonary process.      Electronically signed by: Emre Spence  Date:    06/28/2025  Time:    15:36               Narrative:    CLINICAL HISTORY:  ([id number])30 y/o  (1995) F    Chest Pain;    TECHNIQUE:  (A#[id number], exam time 6/28/2025  15:34)    XR CHEST AP PORTABLE [id number]    COMPARISON:  None available.    FINDINGS:  The lungs are clear. Costophrenic angles are seen without effusion. No pneumothorax is identified. The heart is normal in size. The mediastinum is within normal limits. Osseous structures appear within normal limits. The visualized upper abdomen is unremarkable.                                       Medications   lactated ringers bolus 1,000 mL (0 mLs Intravenous Stopped 6/28/25 1611)   potassium bicarbonate disintegrating tablet 20 mEq (20 mEq Oral Given 6/28/25 1814)     Medical Decision Making  Amount and/or Complexity of Data Reviewed  Labs: ordered.  Radiology: ordered.    Risk  Prescription drug management.               ED Course as of 06/29/25 0920   Sat Jun 28, 2025   1748 Patient seen evaluated emergency department.  Currently this time patient found with no acute electrolyte abnormalities.  Patient had sinus bradycardia however no prolonged QT or , 393.  Patient was able to ambulate in emergency department in heart rate improved to 80s.  Currently remained chest pain-free.  TSH found to be at 1.63, BNP 44, Monospot was negative.  H and H is 12 and 38 with normal platelets of 210.  White count 4000.  Currently at this time patient found to be hemodynamically adequate.  Not orthostatic.  Did receive IV hydration emergency department.  Was given potassium as well for a potassium of 3.8.  Currently at this time patient will be discharged.  She is instructed to hold her her beta-blocker for next 24 hours.  She is to be resume and half dose at 12.5 mg daily or 6.25 mg b.i.d..  Patient given detailed return precautions.  Discharged in good condition. [RM]      ED Course User Index  [RM] Jagdeep Esquivel MD                           Clinical Impression:  Final diagnoses:  [R53.1] Generalized weakness (Primary)  [R00.1] Bradycardia, sinus          ED Disposition Condition    Discharge Stable          ED Prescriptions     None       Follow-up Information       Follow up With Specialties Details Why Contact Info    Christoph Watson MD Family Medicine Schedule an appointment as soon as possible for a visit in 1 week For recheck/continuing care [address] [facility] [address]  [phone]                     [1]   Social History  Tobacco Use    Smoking status: Never    Smokeless tobacco: Never   Substance Use Topics    Alcohol use: No    Drug use: Never        Jagdeep Esquivel MD  06/29/25 0927

## 2025-06-30 LAB
OHS QRS DURATION: 74 MS
OHS QTC CALCULATION: 393 MS

## 2025-08-13 ENCOUNTER — PATIENT MESSAGE (OUTPATIENT)
Dept: OBSTETRICS AND GYNECOLOGY | Facility: CLINIC | Age: 30
End: 2025-08-13
Payer: COMMERCIAL

## 2025-08-13 DIAGNOSIS — Z32.00 ENCOUNTER FOR PREGNANCY TEST, RESULT UNKNOWN: Primary | ICD-10-CM

## 2025-08-14 ENCOUNTER — LAB VISIT (OUTPATIENT)
Dept: LAB | Facility: HOSPITAL | Age: 30
End: 2025-08-14
Attending: OBSTETRICS & GYNECOLOGY
Payer: COMMERCIAL

## 2025-08-14 DIAGNOSIS — Z32.00 ENCOUNTER FOR PREGNANCY TEST, RESULT UNKNOWN: ICD-10-CM

## 2025-08-14 LAB — HCG INTACT+B SERPL-ACNC: <2.42 MIU/ML

## 2025-08-14 PROCEDURE — 36415 COLL VENOUS BLD VENIPUNCTURE: CPT | Mod: PO

## 2025-08-14 PROCEDURE — 84702 CHORIONIC GONADOTROPIN TEST: CPT

## 2025-08-21 ENCOUNTER — HOSPITAL ENCOUNTER (INPATIENT)
Facility: HOSPITAL | Age: 30
LOS: 4 days | Discharge: HOME | DRG: 418 | End: 2025-08-25
Attending: STUDENT IN AN ORGANIZED HEALTH CARE EDUCATION/TRAINING PROGRAM | Admitting: INTERNAL MEDICINE
Payer: COMMERCIAL

## 2025-08-21 ENCOUNTER — HOSPITAL ENCOUNTER (OUTPATIENT)
Dept: RADIOLOGY | Facility: HOSPITAL | Age: 30
Discharge: HOME | End: 2025-08-21
Attending: FAMILY MEDICINE
Payer: COMMERCIAL

## 2025-08-21 ENCOUNTER — OFFICE VISIT (OUTPATIENT)
Dept: FAMILY MEDICINE | Facility: CLINIC | Age: 30
End: 2025-08-21
Payer: COMMERCIAL

## 2025-08-21 ENCOUNTER — TELEPHONE (OUTPATIENT)
Dept: FAMILY MEDICINE | Facility: CLINIC | Age: 30
End: 2025-08-21

## 2025-08-21 VITALS
HEART RATE: 68 BPM | SYSTOLIC BLOOD PRESSURE: 118 MMHG | RESPIRATION RATE: 18 BRPM | OXYGEN SATURATION: 98 % | WEIGHT: 157.63 LBS | TEMPERATURE: 97 F | BODY MASS INDEX: 22.57 KG/M2 | DIASTOLIC BLOOD PRESSURE: 68 MMHG | HEIGHT: 70 IN

## 2025-08-21 DIAGNOSIS — R10.9 ABDOMINAL PAIN, UNSPECIFIED ABDOMINAL LOCATION: Primary | ICD-10-CM

## 2025-08-21 DIAGNOSIS — R10.13 EPIGASTRIC PAIN: ICD-10-CM

## 2025-08-21 DIAGNOSIS — R10.9 ABDOMINAL PAIN, UNSPECIFIED ABDOMINAL LOCATION: ICD-10-CM

## 2025-08-21 DIAGNOSIS — R79.89 ELEVATED LFTS: ICD-10-CM

## 2025-08-21 DIAGNOSIS — R79.89 ABNORMAL LFTS: ICD-10-CM

## 2025-08-21 DIAGNOSIS — K85.90 ACUTE PANCREATITIS, UNSPECIFIED COMPLICATION STATUS, UNSPECIFIED PANCREATITIS TYPE: Primary | ICD-10-CM

## 2025-08-21 DIAGNOSIS — K80.21 CALCULUS OF GALLBLADDER WITH BILIARY OBSTRUCTION BUT WITHOUT CHOLECYSTITIS: ICD-10-CM

## 2025-08-21 DIAGNOSIS — R07.9 CHEST PAIN: ICD-10-CM

## 2025-08-21 DIAGNOSIS — R17 ELEVATED BILIRUBIN: ICD-10-CM

## 2025-08-21 LAB
ABSOLUTE EOSINOPHIL (SMH): 0.02 K/UL
ABSOLUTE MONOCYTE (SMH): 0.23 K/UL (ref 0.3–1)
ABSOLUTE NEUTROPHIL COUNT (SMH): 1.7 K/UL (ref 1.8–7.7)
ALBUMIN SERPL-MCNC: 4.4 G/DL (ref 3.5–5.2)
ALP SERPL-CCNC: 166 UNIT/L (ref 55–135)
ALT SERPL-CCNC: 635 UNIT/L (ref 10–44)
ANION GAP (SMH): 8 MMOL/L (ref 8–16)
APAP SERPL-MCNC: 0.1 UG/ML (ref 10–20)
AST SERPL-CCNC: 367 UNIT/L (ref 10–40)
BASOPHILS # BLD AUTO: 0.01 K/UL
BASOPHILS NFR BLD AUTO: 0.3 %
BILIRUB SERPL-MCNC: 4.3 MG/DL (ref 0.1–1)
BILIRUBIN, UA POC OHS: ABNORMAL
BLOOD, UA POC OHS: ABNORMAL
BUN SERPL-MCNC: 10 MG/DL (ref 6–20)
CALCIUM SERPL-MCNC: 9.3 MG/DL (ref 8.7–10.5)
CHLORIDE SERPL-SCNC: 107 MMOL/L (ref 95–110)
CLARITY, UA POC OHS: ABNORMAL
CO2 SERPL-SCNC: 25 MMOL/L (ref 23–29)
COLOR, UA POC OHS: ABNORMAL
CREAT SERPL-MCNC: 0.9 MG/DL (ref 0.5–1.4)
ERYTHROCYTE [DISTWIDTH] IN BLOOD BY AUTOMATED COUNT: 12.7 % (ref 11.5–14.5)
GFR SERPLBLD CREATININE-BSD FMLA CKD-EPI: >60 ML/MIN/1.73/M2
GLUCOSE SERPL-MCNC: 86 MG/DL (ref 70–110)
GLUCOSE, UA POC OHS: 100
HCT VFR BLD AUTO: 37 % (ref 37–48.5)
HGB BLD-MCNC: 12.6 GM/DL (ref 12–16)
IMM GRANULOCYTES # BLD AUTO: 0.01 K/UL (ref 0–0.04)
IMM GRANULOCYTES NFR BLD AUTO: 0.3 % (ref 0–0.5)
INR PPP: 1 (ref 0.8–1.2)
KETONES, UA POC OHS: ABNORMAL
LEUKOCYTES, UA POC OHS: NEGATIVE
LIPASE SERPL-CCNC: 215 U/L (ref 4–60)
LYMPHOCYTES # BLD AUTO: 1.12 K/UL (ref 1–4.8)
MCH RBC QN AUTO: 31.4 PG (ref 27–31)
MCHC RBC AUTO-ENTMCNC: 34.1 G/DL (ref 32–36)
MCV RBC AUTO: 92 FL (ref 82–98)
NITRITE, UA POC OHS: NEGATIVE
NUCLEATED RBC (/100WBC) (SMH): 0 /100 WBC
PH, UA POC OHS: 6
PLATELET # BLD AUTO: 200 K/UL (ref 150–450)
PMV BLD AUTO: 9.4 FL (ref 9.2–12.9)
POTASSIUM SERPL-SCNC: 3.9 MMOL/L (ref 3.5–5.1)
PROT SERPL-MCNC: 7.1 GM/DL (ref 6–8.4)
PROTEIN, UA POC OHS: ABNORMAL
PROTHROMBIN TIME: 11 SECONDS (ref 9–12.5)
RBC # BLD AUTO: 4.01 M/UL (ref 4–5.4)
RELATIVE EOSINOPHIL (SMH): 0.6 % (ref 0–8)
RELATIVE LYMPHOCYTE (SMH): 35.9 % (ref 18–48)
RELATIVE MONOCYTE (SMH): 7.4 % (ref 4–15)
RELATIVE NEUTROPHIL (SMH): 55.5 % (ref 38–73)
SODIUM SERPL-SCNC: 140 MMOL/L (ref 136–145)
SPECIFIC GRAVITY, UA POC OHS: >=1.03
UROBILINOGEN, UA POC OHS: >=8
WBC # BLD AUTO: 3.12 K/UL (ref 3.9–12.7)

## 2025-08-21 PROCEDURE — 99285 EMERGENCY DEPT VISIT HI MDM: CPT | Mod: 25

## 2025-08-21 PROCEDURE — 80143 DRUG ASSAY ACETAMINOPHEN: CPT | Performed by: STUDENT IN AN ORGANIZED HEALTH CARE EDUCATION/TRAINING PROGRAM

## 2025-08-21 PROCEDURE — 25000003 PHARM REV CODE 250: Performed by: STUDENT IN AN ORGANIZED HEALTH CARE EDUCATION/TRAINING PROGRAM

## 2025-08-21 PROCEDURE — 85025 COMPLETE CBC W/AUTO DIFF WBC: CPT

## 2025-08-21 PROCEDURE — 25000003 PHARM REV CODE 250: Performed by: INTERNAL MEDICINE

## 2025-08-21 PROCEDURE — 3074F SYST BP LT 130 MM HG: CPT | Mod: CPTII,S$GLB,, | Performed by: FAMILY MEDICINE

## 2025-08-21 PROCEDURE — 85610 PROTHROMBIN TIME: CPT | Performed by: STUDENT IN AN ORGANIZED HEALTH CARE EDUCATION/TRAINING PROGRAM

## 2025-08-21 PROCEDURE — 74177 CT ABD & PELVIS W/CONTRAST: CPT | Mod: 26,,, | Performed by: RADIOLOGY

## 2025-08-21 PROCEDURE — 81003 URINALYSIS AUTO W/O SCOPE: CPT | Mod: QW,S$GLB,, | Performed by: FAMILY MEDICINE

## 2025-08-21 PROCEDURE — 87040 BLOOD CULTURE FOR BACTERIA: CPT | Performed by: INTERNAL MEDICINE

## 2025-08-21 PROCEDURE — 3044F HG A1C LEVEL LT 7.0%: CPT | Mod: CPTII,S$GLB,, | Performed by: FAMILY MEDICINE

## 2025-08-21 PROCEDURE — 94799 UNLISTED PULMONARY SVC/PX: CPT

## 2025-08-21 PROCEDURE — A9698 NON-RAD CONTRAST MATERIALNOC: HCPCS | Mod: PO | Performed by: FAMILY MEDICINE

## 2025-08-21 PROCEDURE — 99214 OFFICE O/P EST MOD 30 MIN: CPT | Mod: S$GLB,,, | Performed by: FAMILY MEDICINE

## 2025-08-21 PROCEDURE — 3008F BODY MASS INDEX DOCD: CPT | Mod: CPTII,S$GLB,, | Performed by: FAMILY MEDICINE

## 2025-08-21 PROCEDURE — 1160F RVW MEDS BY RX/DR IN RCRD: CPT | Mod: CPTII,S$GLB,, | Performed by: FAMILY MEDICINE

## 2025-08-21 PROCEDURE — 94760 N-INVAS EAR/PLS OXIMETRY 1: CPT

## 2025-08-21 PROCEDURE — 80074 ACUTE HEPATITIS PANEL: CPT | Performed by: STUDENT IN AN ORGANIZED HEALTH CARE EDUCATION/TRAINING PROGRAM

## 2025-08-21 PROCEDURE — 99999 PR PBB SHADOW E&M-EST. PATIENT-LVL IV: CPT | Mod: PBBFAC,,, | Performed by: FAMILY MEDICINE

## 2025-08-21 PROCEDURE — 84132 ASSAY OF SERUM POTASSIUM: CPT | Performed by: STUDENT IN AN ORGANIZED HEALTH CARE EDUCATION/TRAINING PROGRAM

## 2025-08-21 PROCEDURE — 11000001 HC ACUTE MED/SURG PRIVATE ROOM

## 2025-08-21 PROCEDURE — 1159F MED LIST DOCD IN RCRD: CPT | Mod: CPTII,S$GLB,, | Performed by: FAMILY MEDICINE

## 2025-08-21 PROCEDURE — 74177 CT ABD & PELVIS W/CONTRAST: CPT | Mod: TC,PO

## 2025-08-21 PROCEDURE — 99900031 HC PATIENT EDUCATION (STAT)

## 2025-08-21 PROCEDURE — 99900035 HC TECH TIME PER 15 MIN (STAT)

## 2025-08-21 PROCEDURE — 83690 ASSAY OF LIPASE: CPT | Performed by: STUDENT IN AN ORGANIZED HEALTH CARE EDUCATION/TRAINING PROGRAM

## 2025-08-21 PROCEDURE — 87086 URINE CULTURE/COLONY COUNT: CPT | Performed by: FAMILY MEDICINE

## 2025-08-21 PROCEDURE — 25500020 PHARM REV CODE 255: Mod: PO | Performed by: FAMILY MEDICINE

## 2025-08-21 PROCEDURE — 36415 COLL VENOUS BLD VENIPUNCTURE: CPT | Performed by: INTERNAL MEDICINE

## 2025-08-21 PROCEDURE — 84703 CHORIONIC GONADOTROPIN ASSAY: CPT | Performed by: INTERNAL MEDICINE

## 2025-08-21 PROCEDURE — 63600175 PHARM REV CODE 636 W HCPCS: Performed by: STUDENT IN AN ORGANIZED HEALTH CARE EDUCATION/TRAINING PROGRAM

## 2025-08-21 PROCEDURE — 3078F DIAST BP <80 MM HG: CPT | Mod: CPTII,S$GLB,, | Performed by: FAMILY MEDICINE

## 2025-08-21 RX ORDER — ACETAMINOPHEN 325 MG/1
650 TABLET ORAL EVERY 8 HOURS PRN
Status: DISCONTINUED | OUTPATIENT
Start: 2025-08-21 | End: 2025-08-21

## 2025-08-21 RX ORDER — SODIUM CHLORIDE 9 MG/ML
INJECTION, SOLUTION INTRAVENOUS CONTINUOUS
Status: DISCONTINUED | OUTPATIENT
Start: 2025-08-21 | End: 2025-08-22

## 2025-08-21 RX ORDER — NALOXONE HCL 0.4 MG/ML
0.02 VIAL (ML) INJECTION
Status: DISCONTINUED | OUTPATIENT
Start: 2025-08-21 | End: 2025-08-25 | Stop reason: HOSPADM

## 2025-08-21 RX ORDER — ONDANSETRON 4 MG/1
4 TABLET, ORALLY DISINTEGRATING ORAL
Status: COMPLETED | OUTPATIENT
Start: 2025-08-21 | End: 2025-08-21

## 2025-08-21 RX ORDER — SODIUM,POTASSIUM PHOSPHATES 280-250MG
2 POWDER IN PACKET (EA) ORAL
Status: DISCONTINUED | OUTPATIENT
Start: 2025-08-21 | End: 2025-08-25 | Stop reason: HOSPADM

## 2025-08-21 RX ORDER — LORATADINE 10 MG
10 TABLET,DISINTEGRATING ORAL DAILY
COMMUNITY

## 2025-08-21 RX ORDER — GLUCAGON 1 MG
1 KIT INJECTION
Status: DISCONTINUED | OUTPATIENT
Start: 2025-08-21 | End: 2025-08-25 | Stop reason: HOSPADM

## 2025-08-21 RX ORDER — ACETAMINOPHEN 325 MG/1
650 TABLET ORAL EVERY 4 HOURS PRN
Status: DISCONTINUED | OUTPATIENT
Start: 2025-08-21 | End: 2025-08-21

## 2025-08-21 RX ORDER — MORPHINE SULFATE 2 MG/ML
2 INJECTION, SOLUTION INTRAMUSCULAR; INTRAVENOUS
Refills: 0 | Status: DISCONTINUED | OUTPATIENT
Start: 2025-08-21 | End: 2025-08-25 | Stop reason: HOSPADM

## 2025-08-21 RX ORDER — ALUMINUM HYDROXIDE, MAGNESIUM HYDROXIDE, AND SIMETHICONE 1200; 120; 1200 MG/30ML; MG/30ML; MG/30ML
30 SUSPENSION ORAL 4 TIMES DAILY PRN
Status: DISCONTINUED | OUTPATIENT
Start: 2025-08-21 | End: 2025-08-25 | Stop reason: HOSPADM

## 2025-08-21 RX ORDER — LANOLIN ALCOHOL/MO/W.PET/CERES
800 CREAM (GRAM) TOPICAL
Status: DISCONTINUED | OUTPATIENT
Start: 2025-08-21 | End: 2025-08-25 | Stop reason: HOSPADM

## 2025-08-21 RX ORDER — TALC
6 POWDER (GRAM) TOPICAL NIGHTLY PRN
Status: DISCONTINUED | OUTPATIENT
Start: 2025-08-21 | End: 2025-08-25 | Stop reason: HOSPADM

## 2025-08-21 RX ORDER — HYDROCODONE BITARTRATE AND ACETAMINOPHEN 7.5; 325 MG/1; MG/1
1 TABLET ORAL
Refills: 0 | Status: COMPLETED | OUTPATIENT
Start: 2025-08-21 | End: 2025-08-21

## 2025-08-21 RX ORDER — IBUPROFEN 200 MG
24 TABLET ORAL
Status: DISCONTINUED | OUTPATIENT
Start: 2025-08-21 | End: 2025-08-25 | Stop reason: HOSPADM

## 2025-08-21 RX ORDER — IBUPROFEN 200 MG
16 TABLET ORAL
Status: DISCONTINUED | OUTPATIENT
Start: 2025-08-21 | End: 2025-08-25 | Stop reason: HOSPADM

## 2025-08-21 RX ORDER — ONDANSETRON HYDROCHLORIDE 2 MG/ML
4 INJECTION, SOLUTION INTRAVENOUS EVERY 6 HOURS PRN
Status: DISCONTINUED | OUTPATIENT
Start: 2025-08-21 | End: 2025-08-25 | Stop reason: HOSPADM

## 2025-08-21 RX ADMIN — IOHEXOL 75 ML: 350 INJECTION, SOLUTION INTRAVENOUS at 01:08

## 2025-08-21 RX ADMIN — PIPERACILLIN SODIUM AND TAZOBACTAM SODIUM 4.5 G: 4; .5 INJECTION, POWDER, LYOPHILIZED, FOR SOLUTION INTRAVENOUS at 10:08

## 2025-08-21 RX ADMIN — HYDROCODONE BITARTRATE AND ACETAMINOPHEN 1 TABLET: 7.5; 325 TABLET ORAL at 06:08

## 2025-08-21 RX ADMIN — ONDANSETRON 4 MG: 4 TABLET, ORALLY DISINTEGRATING ORAL at 06:08

## 2025-08-21 RX ADMIN — SODIUM CHLORIDE: 9 INJECTION, SOLUTION INTRAVENOUS at 10:08

## 2025-08-21 RX ADMIN — IOHEXOL 1000 ML: 9 SOLUTION ORAL at 01:08

## 2025-08-22 ENCOUNTER — ANESTHESIA EVENT (OUTPATIENT)
Dept: SURGERY | Facility: HOSPITAL | Age: 30
End: 2025-08-22
Payer: COMMERCIAL

## 2025-08-22 ENCOUNTER — ANESTHESIA (OUTPATIENT)
Dept: SURGERY | Facility: HOSPITAL | Age: 30
End: 2025-08-22
Payer: COMMERCIAL

## 2025-08-22 LAB
ABSOLUTE EOSINOPHIL (SMH): 0.07 K/UL
ABSOLUTE MONOCYTE (SMH): 0.32 K/UL (ref 0.3–1)
ABSOLUTE NEUTROPHIL COUNT (SMH): 1.2 K/UL (ref 1.8–7.7)
ALBUMIN SERPL-MCNC: 3.9 G/DL (ref 3.5–5.2)
ALP SERPL-CCNC: 167 UNIT/L (ref 55–135)
ALT SERPL-CCNC: 528 UNIT/L (ref 10–44)
ANION GAP (SMH): 6 MMOL/L (ref 8–16)
AST SERPL-CCNC: 276 UNIT/L (ref 10–40)
B-HCG SERPL QL: NEGATIVE
BASOPHILS # BLD AUTO: 0.02 K/UL
BASOPHILS NFR BLD AUTO: 0.7 %
BILIRUB SERPL-MCNC: 3.1 MG/DL (ref 0.1–1)
BUN SERPL-MCNC: 9 MG/DL (ref 6–20)
CALCIUM SERPL-MCNC: 8.8 MG/DL (ref 8.7–10.5)
CHLORIDE SERPL-SCNC: 107 MMOL/L (ref 95–110)
CO2 SERPL-SCNC: 25 MMOL/L (ref 23–29)
CREAT SERPL-MCNC: 0.9 MG/DL (ref 0.5–1.4)
ERYTHROCYTE [DISTWIDTH] IN BLOOD BY AUTOMATED COUNT: 12.9 % (ref 11.5–14.5)
GFR SERPLBLD CREATININE-BSD FMLA CKD-EPI: >60 ML/MIN/1.73/M2
GLUCOSE SERPL-MCNC: 83 MG/DL (ref 70–110)
HCT VFR BLD AUTO: 33.6 % (ref 37–48.5)
HGB BLD-MCNC: 11.5 GM/DL (ref 12–16)
IMM GRANULOCYTES # BLD AUTO: 0.01 K/UL (ref 0–0.04)
IMM GRANULOCYTES NFR BLD AUTO: 0.3 % (ref 0–0.5)
LIPASE SERPL-CCNC: 63 U/L (ref 4–60)
LYMPHOCYTES # BLD AUTO: 1.38 K/UL (ref 1–4.8)
MAGNESIUM SERPL-MCNC: 2 MG/DL (ref 1.6–2.6)
MCH RBC QN AUTO: 32 PG (ref 27–31)
MCHC RBC AUTO-ENTMCNC: 34.2 G/DL (ref 32–36)
MCV RBC AUTO: 94 FL (ref 82–98)
NUCLEATED RBC (/100WBC) (SMH): 0 /100 WBC
PLATELET # BLD AUTO: 183 K/UL (ref 150–450)
PMV BLD AUTO: 9.5 FL (ref 9.2–12.9)
POTASSIUM SERPL-SCNC: 3.8 MMOL/L (ref 3.5–5.1)
PROT SERPL-MCNC: 6.2 GM/DL (ref 6–8.4)
RBC # BLD AUTO: 3.59 M/UL (ref 4–5.4)
RELATIVE EOSINOPHIL (SMH): 2.3 % (ref 0–8)
RELATIVE LYMPHOCYTE (SMH): 45.5 % (ref 18–48)
RELATIVE MONOCYTE (SMH): 10.6 % (ref 4–15)
RELATIVE NEUTROPHIL (SMH): 40.6 % (ref 38–73)
SODIUM SERPL-SCNC: 138 MMOL/L (ref 136–145)
WBC # BLD AUTO: 3.03 K/UL (ref 3.9–12.7)

## 2025-08-22 PROCEDURE — 0DJ08ZZ INSPECTION OF UPPER INTESTINAL TRACT, VIA NATURAL OR ARTIFICIAL OPENING ENDOSCOPIC: ICD-10-PCS | Performed by: INTERNAL MEDICINE

## 2025-08-22 PROCEDURE — 83690 ASSAY OF LIPASE: CPT | Performed by: INTERNAL MEDICINE

## 2025-08-22 PROCEDURE — 94761 N-INVAS EAR/PLS OXIMETRY MLT: CPT

## 2025-08-22 PROCEDURE — 63600175 PHARM REV CODE 636 W HCPCS

## 2025-08-22 PROCEDURE — 43259 EGD US EXAM DUODENUM/JEJUNUM: CPT | Performed by: INTERNAL MEDICINE

## 2025-08-22 PROCEDURE — 85025 COMPLETE CBC W/AUTO DIFF WBC: CPT | Performed by: INTERNAL MEDICINE

## 2025-08-22 PROCEDURE — 25000003 PHARM REV CODE 250: Performed by: STUDENT IN AN ORGANIZED HEALTH CARE EDUCATION/TRAINING PROGRAM

## 2025-08-22 PROCEDURE — 25000003 PHARM REV CODE 250: Performed by: INTERNAL MEDICINE

## 2025-08-22 PROCEDURE — 11000001 HC ACUTE MED/SURG PRIVATE ROOM

## 2025-08-22 PROCEDURE — 63600175 PHARM REV CODE 636 W HCPCS: Performed by: INTERNAL MEDICINE

## 2025-08-22 PROCEDURE — 83735 ASSAY OF MAGNESIUM: CPT | Performed by: INTERNAL MEDICINE

## 2025-08-22 PROCEDURE — 37000008 HC ANESTHESIA 1ST 15 MINUTES: Performed by: INTERNAL MEDICINE

## 2025-08-22 PROCEDURE — 36415 COLL VENOUS BLD VENIPUNCTURE: CPT | Performed by: INTERNAL MEDICINE

## 2025-08-22 PROCEDURE — 80053 COMPREHEN METABOLIC PANEL: CPT | Performed by: INTERNAL MEDICINE

## 2025-08-22 PROCEDURE — 63600175 PHARM REV CODE 636 W HCPCS: Performed by: ANESTHESIOLOGY

## 2025-08-22 RX ORDER — LORAZEPAM 1 MG/1
1 TABLET ORAL EVERY 6 HOURS PRN
Status: DISCONTINUED | OUTPATIENT
Start: 2025-08-22 | End: 2025-08-25 | Stop reason: HOSPADM

## 2025-08-22 RX ORDER — HYDROCODONE BITARTRATE AND ACETAMINOPHEN 5; 325 MG/1; MG/1
1 TABLET ORAL EVERY 6 HOURS PRN
Refills: 0 | Status: DISCONTINUED | OUTPATIENT
Start: 2025-08-22 | End: 2025-08-24

## 2025-08-22 RX ORDER — DOCUSATE SODIUM 100 MG/1
100 CAPSULE, LIQUID FILLED ORAL 2 TIMES DAILY
Status: DISCONTINUED | OUTPATIENT
Start: 2025-08-22 | End: 2025-08-25 | Stop reason: HOSPADM

## 2025-08-22 RX ORDER — ONDANSETRON HYDROCHLORIDE 2 MG/ML
INJECTION, SOLUTION INTRAVENOUS
Status: DISCONTINUED | OUTPATIENT
Start: 2025-08-22 | End: 2025-08-22

## 2025-08-22 RX ORDER — PROPOFOL 10 MG/ML
VIAL (ML) INTRAVENOUS
Status: DISCONTINUED | OUTPATIENT
Start: 2025-08-22 | End: 2025-08-22

## 2025-08-22 RX ADMIN — PIPERACILLIN SODIUM AND TAZOBACTAM SODIUM 4.5 G: 4; .5 INJECTION, POWDER, LYOPHILIZED, FOR SOLUTION INTRAVENOUS at 11:08

## 2025-08-22 RX ADMIN — PIPERACILLIN SODIUM AND TAZOBACTAM SODIUM 4.5 G: 4; .5 INJECTION, POWDER, LYOPHILIZED, FOR SOLUTION INTRAVENOUS at 04:08

## 2025-08-22 RX ADMIN — HYDROCODONE BITARTRATE AND ACETAMINOPHEN 1 TABLET: 5; 325 TABLET ORAL at 09:08

## 2025-08-22 RX ADMIN — PROPOFOL 50 MG: 10 INJECTION, EMULSION INTRAVENOUS at 02:08

## 2025-08-22 RX ADMIN — LORAZEPAM 1 MG: 1 TABLET ORAL at 04:08

## 2025-08-22 RX ADMIN — DOCUSATE SODIUM 100 MG: 100 CAPSULE, LIQUID FILLED ORAL at 08:08

## 2025-08-22 RX ADMIN — MORPHINE SULFATE 2 MG: 2 INJECTION, SOLUTION INTRAMUSCULAR; INTRAVENOUS at 01:08

## 2025-08-22 RX ADMIN — UBROGEPANT 100 MG: 100 TABLET ORAL at 10:08

## 2025-08-22 RX ADMIN — PIPERACILLIN SODIUM AND TAZOBACTAM SODIUM 4.5 G: 4; .5 INJECTION, POWDER, LYOPHILIZED, FOR SOLUTION INTRAVENOUS at 08:08

## 2025-08-22 RX ADMIN — LORAZEPAM 1 MG: 1 TABLET ORAL at 09:08

## 2025-08-22 RX ADMIN — PROPOFOL 20 MG: 10 INJECTION, EMULSION INTRAVENOUS at 02:08

## 2025-08-22 RX ADMIN — PROPOFOL 100 MG: 10 INJECTION, EMULSION INTRAVENOUS at 02:08

## 2025-08-22 RX ADMIN — ONDANSETRON 4 MG: 2 INJECTION INTRAMUSCULAR; INTRAVENOUS at 01:08

## 2025-08-22 RX ADMIN — HYDROCODONE BITARTRATE AND ACETAMINOPHEN 1 TABLET: 5; 325 TABLET ORAL at 04:08

## 2025-08-22 RX ADMIN — ONDANSETRON 4 MG: 2 INJECTION INTRAMUSCULAR; INTRAVENOUS at 08:08

## 2025-08-23 ENCOUNTER — ANESTHESIA (OUTPATIENT)
Dept: SURGERY | Facility: HOSPITAL | Age: 30
End: 2025-08-23
Payer: COMMERCIAL

## 2025-08-23 ENCOUNTER — ANESTHESIA EVENT (OUTPATIENT)
Dept: SURGERY | Facility: HOSPITAL | Age: 30
End: 2025-08-23
Payer: COMMERCIAL

## 2025-08-23 LAB
ABORH RETYPE: NORMAL
ABSOLUTE EOSINOPHIL (SMH): 0.04 K/UL
ABSOLUTE MONOCYTE (SMH): 0.31 K/UL (ref 0.3–1)
ABSOLUTE NEUTROPHIL COUNT (SMH): 1.3 K/UL (ref 1.8–7.7)
ALBUMIN SERPL-MCNC: 4.2 G/DL (ref 3.5–5.2)
ALP SERPL-CCNC: 190 UNIT/L (ref 55–135)
ALT SERPL-CCNC: 475 UNIT/L (ref 10–44)
ANION GAP (SMH): 7 MMOL/L (ref 8–16)
AST SERPL-CCNC: 173 UNIT/L (ref 10–40)
BACTERIA UR CULT: ABNORMAL
BASOPHILS # BLD AUTO: 0.02 K/UL
BASOPHILS NFR BLD AUTO: 0.7 %
BILIRUB SERPL-MCNC: 2.2 MG/DL (ref 0.1–1)
BUN SERPL-MCNC: 8 MG/DL (ref 6–20)
CALCIUM SERPL-MCNC: 9.6 MG/DL (ref 8.7–10.5)
CHLORIDE SERPL-SCNC: 106 MMOL/L (ref 95–110)
CO2 SERPL-SCNC: 26 MMOL/L (ref 23–29)
CREAT SERPL-MCNC: 1.1 MG/DL (ref 0.5–1.4)
ERYTHROCYTE [DISTWIDTH] IN BLOOD BY AUTOMATED COUNT: 12.7 % (ref 11.5–14.5)
GFR SERPLBLD CREATININE-BSD FMLA CKD-EPI: >60 ML/MIN/1.73/M2
GLUCOSE SERPL-MCNC: 96 MG/DL (ref 70–110)
HAV IGM SERPL QL IA: NEGATIVE
HBV CORE IGM SERPL QL IA: NEGATIVE
HBV SURFACE AG SERPL QL IA: NEGATIVE
HCT VFR BLD AUTO: 36.9 % (ref 37–48.5)
HCV AB SERPL QL IA: NON REACTIVE
HCV AB SERPL QL IA: NORMAL
HGB BLD-MCNC: 12.5 GM/DL (ref 12–16)
IMM GRANULOCYTES # BLD AUTO: 0.01 K/UL (ref 0–0.04)
IMM GRANULOCYTES NFR BLD AUTO: 0.4 % (ref 0–0.5)
INDIRECT COOMBS: NORMAL
LYMPHOCYTES # BLD AUTO: 1.13 K/UL (ref 1–4.8)
MAGNESIUM SERPL-MCNC: 2 MG/DL (ref 1.6–2.6)
MCH RBC QN AUTO: 31.6 PG (ref 27–31)
MCHC RBC AUTO-ENTMCNC: 33.9 G/DL (ref 32–36)
MCV RBC AUTO: 93 FL (ref 82–98)
NUCLEATED RBC (/100WBC) (SMH): 0 /100 WBC
PLATELET # BLD AUTO: 187 K/UL (ref 150–450)
PMV BLD AUTO: 9.4 FL (ref 9.2–12.9)
POTASSIUM SERPL-SCNC: 4 MMOL/L (ref 3.5–5.1)
PROT SERPL-MCNC: 6.8 GM/DL (ref 6–8.4)
RBC # BLD AUTO: 3.96 M/UL (ref 4–5.4)
RELATIVE EOSINOPHIL (SMH): 1.4 % (ref 0–8)
RELATIVE LYMPHOCYTE (SMH): 40.5 % (ref 18–48)
RELATIVE MONOCYTE (SMH): 11.1 % (ref 4–15)
RELATIVE NEUTROPHIL (SMH): 45.9 % (ref 38–73)
RH BLD: NORMAL
SODIUM SERPL-SCNC: 139 MMOL/L (ref 136–145)
SPECIMEN OUTDATE: NORMAL
WBC # BLD AUTO: 2.79 K/UL (ref 3.9–12.7)

## 2025-08-23 PROCEDURE — 11000001 HC ACUTE MED/SURG PRIVATE ROOM

## 2025-08-23 PROCEDURE — 94761 N-INVAS EAR/PLS OXIMETRY MLT: CPT

## 2025-08-23 PROCEDURE — 25000003 PHARM REV CODE 250: Performed by: INTERNAL MEDICINE

## 2025-08-23 PROCEDURE — 63600175 PHARM REV CODE 636 W HCPCS: Performed by: NURSE ANESTHETIST, CERTIFIED REGISTERED

## 2025-08-23 PROCEDURE — 71000039 HC RECOVERY, EACH ADD'L HOUR: Performed by: SURGERY

## 2025-08-23 PROCEDURE — 63600175 PHARM REV CODE 636 W HCPCS: Mod: JZ,TB | Performed by: SURGERY

## 2025-08-23 PROCEDURE — 37000009 HC ANESTHESIA EA ADD 15 MINS: Performed by: SURGERY

## 2025-08-23 PROCEDURE — 0FT44ZZ RESECTION OF GALLBLADDER, PERCUTANEOUS ENDOSCOPIC APPROACH: ICD-10-PCS | Performed by: SURGERY

## 2025-08-23 PROCEDURE — 37000008 HC ANESTHESIA 1ST 15 MINUTES: Performed by: SURGERY

## 2025-08-23 PROCEDURE — 36415 COLL VENOUS BLD VENIPUNCTURE: CPT | Performed by: INTERNAL MEDICINE

## 2025-08-23 PROCEDURE — 86901 BLOOD TYPING SEROLOGIC RH(D): CPT | Performed by: INTERNAL MEDICINE

## 2025-08-23 PROCEDURE — 27000221 HC OXYGEN, UP TO 24 HOURS

## 2025-08-23 PROCEDURE — 47562 LAPAROSCOPIC CHOLECYSTECTOMY: CPT | Mod: ,,, | Performed by: SURGERY

## 2025-08-23 PROCEDURE — 27201423 OPTIME MED/SURG SUP & DEVICES STERILE SUPPLY: Performed by: SURGERY

## 2025-08-23 PROCEDURE — 99231 SBSQ HOSP IP/OBS SF/LOW 25: CPT | Mod: 57,,, | Performed by: SURGERY

## 2025-08-23 PROCEDURE — 63600175 PHARM REV CODE 636 W HCPCS: Performed by: STUDENT IN AN ORGANIZED HEALTH CARE EDUCATION/TRAINING PROGRAM

## 2025-08-23 PROCEDURE — 82040 ASSAY OF SERUM ALBUMIN: CPT | Performed by: INTERNAL MEDICINE

## 2025-08-23 PROCEDURE — 63600175 PHARM REV CODE 636 W HCPCS: Performed by: INTERNAL MEDICINE

## 2025-08-23 PROCEDURE — 25000003 PHARM REV CODE 250: Performed by: NURSE ANESTHETIST, CERTIFIED REGISTERED

## 2025-08-23 PROCEDURE — 83735 ASSAY OF MAGNESIUM: CPT | Performed by: INTERNAL MEDICINE

## 2025-08-23 PROCEDURE — 36000709 HC OR TIME LEV III EA ADD 15 MIN: Performed by: SURGERY

## 2025-08-23 PROCEDURE — 71000033 HC RECOVERY, INTIAL HOUR: Performed by: SURGERY

## 2025-08-23 PROCEDURE — 36000708 HC OR TIME LEV III 1ST 15 MIN: Performed by: SURGERY

## 2025-08-23 PROCEDURE — 85025 COMPLETE CBC W/AUTO DIFF WBC: CPT | Performed by: INTERNAL MEDICINE

## 2025-08-23 PROCEDURE — 25000003 PHARM REV CODE 250: Performed by: STUDENT IN AN ORGANIZED HEALTH CARE EDUCATION/TRAINING PROGRAM

## 2025-08-23 RX ORDER — ONDANSETRON HYDROCHLORIDE 2 MG/ML
INJECTION, SOLUTION INTRAMUSCULAR; INTRAVENOUS
Status: DISCONTINUED | OUTPATIENT
Start: 2025-08-23 | End: 2025-08-23

## 2025-08-23 RX ORDER — SCOPOLAMINE 1 MG/3D
1 PATCH, EXTENDED RELEASE TRANSDERMAL
Status: DISCONTINUED | OUTPATIENT
Start: 2025-08-23 | End: 2025-08-25 | Stop reason: HOSPADM

## 2025-08-23 RX ORDER — FENTANYL CITRATE 50 UG/ML
INJECTION, SOLUTION INTRAMUSCULAR; INTRAVENOUS
Status: DISCONTINUED | OUTPATIENT
Start: 2025-08-23 | End: 2025-08-23

## 2025-08-23 RX ORDER — GLUCAGON 1 MG
1 KIT INJECTION
Status: DISCONTINUED | OUTPATIENT
Start: 2025-08-23 | End: 2025-08-24

## 2025-08-23 RX ORDER — MEPERIDINE HYDROCHLORIDE 50 MG/ML
12.5 INJECTION INTRAMUSCULAR; INTRAVENOUS; SUBCUTANEOUS EVERY 10 MIN PRN
Status: DISCONTINUED | OUTPATIENT
Start: 2025-08-23 | End: 2025-08-23

## 2025-08-23 RX ORDER — BUPIVACAINE HYDROCHLORIDE 2.5 MG/ML
INJECTION, SOLUTION EPIDURAL; INFILTRATION; INTRACAUDAL; PERINEURAL
Status: DISCONTINUED | OUTPATIENT
Start: 2025-08-23 | End: 2025-08-23 | Stop reason: HOSPADM

## 2025-08-23 RX ORDER — DEXAMETHASONE SODIUM PHOSPHATE 4 MG/ML
INJECTION, SOLUTION INTRA-ARTICULAR; INTRALESIONAL; INTRAMUSCULAR; INTRAVENOUS; SOFT TISSUE
Status: DISCONTINUED | OUTPATIENT
Start: 2025-08-23 | End: 2025-08-23

## 2025-08-23 RX ORDER — BUPIVACAINE 13.3 MG/ML
INJECTION, SUSPENSION, LIPOSOMAL INFILTRATION
Status: DISCONTINUED | OUTPATIENT
Start: 2025-08-23 | End: 2025-08-23 | Stop reason: HOSPADM

## 2025-08-23 RX ORDER — ROCURONIUM BROMIDE 10 MG/ML
INJECTION, SOLUTION INTRAVENOUS
Status: DISCONTINUED | OUTPATIENT
Start: 2025-08-23 | End: 2025-08-23

## 2025-08-23 RX ORDER — DIPHENHYDRAMINE HYDROCHLORIDE 50 MG/ML
12.5 INJECTION, SOLUTION INTRAMUSCULAR; INTRAVENOUS
Status: DISCONTINUED | OUTPATIENT
Start: 2025-08-23 | End: 2025-08-24

## 2025-08-23 RX ORDER — ESMOLOL HYDROCHLORIDE 10 MG/ML
INJECTION INTRAVENOUS
Status: DISCONTINUED | OUTPATIENT
Start: 2025-08-23 | End: 2025-08-23

## 2025-08-23 RX ORDER — OXYCODONE HYDROCHLORIDE 5 MG/1
5 TABLET ORAL
Status: DISCONTINUED | OUTPATIENT
Start: 2025-08-23 | End: 2025-08-24

## 2025-08-23 RX ORDER — MIDAZOLAM HYDROCHLORIDE 1 MG/ML
INJECTION INTRAMUSCULAR; INTRAVENOUS
Status: DISCONTINUED | OUTPATIENT
Start: 2025-08-23 | End: 2025-08-23

## 2025-08-23 RX ORDER — KETAMINE HCL IN 0.9 % NACL 50 MG/5 ML
SYRINGE (ML) INTRAVENOUS
Status: DISCONTINUED | OUTPATIENT
Start: 2025-08-23 | End: 2025-08-23

## 2025-08-23 RX ORDER — HYDROMORPHONE HYDROCHLORIDE 1 MG/ML
0.2 INJECTION, SOLUTION INTRAMUSCULAR; INTRAVENOUS; SUBCUTANEOUS EVERY 5 MIN PRN
Status: DISCONTINUED | OUTPATIENT
Start: 2025-08-23 | End: 2025-08-24

## 2025-08-23 RX ORDER — FAMOTIDINE 10 MG/ML
INJECTION, SOLUTION INTRAVENOUS
Status: DISCONTINUED | OUTPATIENT
Start: 2025-08-23 | End: 2025-08-23

## 2025-08-23 RX ORDER — LIDOCAINE HYDROCHLORIDE 20 MG/ML
INJECTION, SOLUTION EPIDURAL; INFILTRATION; INTRACAUDAL; PERINEURAL
Status: DISCONTINUED | OUTPATIENT
Start: 2025-08-23 | End: 2025-08-23

## 2025-08-23 RX ORDER — SUCCINYLCHOLINE CHLORIDE 20 MG/ML
INJECTION INTRAMUSCULAR; INTRAVENOUS
Status: DISCONTINUED | OUTPATIENT
Start: 2025-08-23 | End: 2025-08-23

## 2025-08-23 RX ORDER — PROPOFOL 10 MG/ML
VIAL (ML) INTRAVENOUS
Status: DISCONTINUED | OUTPATIENT
Start: 2025-08-23 | End: 2025-08-23

## 2025-08-23 RX ORDER — ONDANSETRON HYDROCHLORIDE 2 MG/ML
4 INJECTION, SOLUTION INTRAVENOUS DAILY PRN
Status: DISCONTINUED | OUTPATIENT
Start: 2025-08-23 | End: 2025-08-24

## 2025-08-23 RX ADMIN — ONDANSETRON 4 MG: 2 INJECTION INTRAMUSCULAR; INTRAVENOUS at 01:08

## 2025-08-23 RX ADMIN — Medication 120 MG: at 12:08

## 2025-08-23 RX ADMIN — MEPERIDINE HYDROCHLORIDE 12.5 MG: 50 INJECTION INTRAMUSCULAR; INTRAVENOUS; SUBCUTANEOUS at 02:08

## 2025-08-23 RX ADMIN — ESMOLOL HYDROCHLORIDE 10 MG: 10 INJECTION, SOLUTION INTRAVENOUS at 01:08

## 2025-08-23 RX ADMIN — DEXAMETHASONE SODIUM PHOSPHATE 4 MG: 4 INJECTION, SOLUTION INTRAMUSCULAR; INTRAVENOUS at 01:08

## 2025-08-23 RX ADMIN — ROCURONIUM BROMIDE 50 MG: 10 INJECTION, SOLUTION INTRAVENOUS at 12:08

## 2025-08-23 RX ADMIN — FENTANYL CITRATE 50 MCG: 0.05 INJECTION, SOLUTION INTRAMUSCULAR; INTRAVENOUS at 12:08

## 2025-08-23 RX ADMIN — HYDROMORPHONE HYDROCHLORIDE 0.2 MG: 1 INJECTION, SOLUTION INTRAMUSCULAR; INTRAVENOUS; SUBCUTANEOUS at 02:08

## 2025-08-23 RX ADMIN — FENTANYL CITRATE 100 MCG: 0.05 INJECTION, SOLUTION INTRAMUSCULAR; INTRAVENOUS at 12:08

## 2025-08-23 RX ADMIN — DOCUSATE SODIUM 100 MG: 100 CAPSULE, LIQUID FILLED ORAL at 08:08

## 2025-08-23 RX ADMIN — SCOPOLAMINE 1 PATCH: 1.5 PATCH, EXTENDED RELEASE TRANSDERMAL at 08:08

## 2025-08-23 RX ADMIN — SODIUM CHLORIDE, SODIUM LACTATE, POTASSIUM CHLORIDE, AND CALCIUM CHLORIDE: .6; .31; .03; .02 INJECTION, SOLUTION INTRAVENOUS at 12:08

## 2025-08-23 RX ADMIN — LORAZEPAM 1 MG: 1 TABLET ORAL at 08:08

## 2025-08-23 RX ADMIN — MIDAZOLAM HYDROCHLORIDE 2 MG: 1 INJECTION, SOLUTION INTRAMUSCULAR; INTRAVENOUS at 12:08

## 2025-08-23 RX ADMIN — Medication 30 MG: at 01:08

## 2025-08-23 RX ADMIN — LIDOCAINE HYDROCHLORIDE 100 MG: 20 INJECTION, SOLUTION INTRAVENOUS at 12:08

## 2025-08-23 RX ADMIN — SUGAMMADEX 200 MG: 100 INJECTION, SOLUTION INTRAVENOUS at 01:08

## 2025-08-23 RX ADMIN — ONDANSETRON 4 MG: 2 INJECTION INTRAMUSCULAR; INTRAVENOUS at 12:08

## 2025-08-23 RX ADMIN — HYDROMORPHONE HYDROCHLORIDE 0.2 MG: 1 INJECTION, SOLUTION INTRAMUSCULAR; INTRAVENOUS; SUBCUTANEOUS at 10:08

## 2025-08-23 RX ADMIN — OXYCODONE HYDROCHLORIDE 5 MG: 5 TABLET ORAL at 03:08

## 2025-08-23 RX ADMIN — DIPHENHYDRAMINE HYDROCHLORIDE 12.5 MG: 50 INJECTION, SOLUTION INTRAMUSCULAR; INTRAVENOUS at 02:08

## 2025-08-23 RX ADMIN — PIPERACILLIN SODIUM AND TAZOBACTAM SODIUM 4.5 G: 4; .5 INJECTION, POWDER, LYOPHILIZED, FOR SOLUTION INTRAVENOUS at 07:08

## 2025-08-23 RX ADMIN — LIDOCAINE HYDROCHLORIDE 100 MG: 20 INJECTION, SOLUTION INTRAVENOUS at 01:08

## 2025-08-23 RX ADMIN — DOCUSATE SODIUM 100 MG: 100 CAPSULE, LIQUID FILLED ORAL at 07:08

## 2025-08-23 RX ADMIN — MORPHINE SULFATE 2 MG: 2 INJECTION, SOLUTION INTRAMUSCULAR; INTRAVENOUS at 04:08

## 2025-08-23 RX ADMIN — FAMOTIDINE 20 MG: 10 INJECTION, SOLUTION INTRAVENOUS at 01:08

## 2025-08-23 RX ADMIN — HYDROCODONE BITARTRATE AND ACETAMINOPHEN 1 TABLET: 5; 325 TABLET ORAL at 07:08

## 2025-08-23 RX ADMIN — PROPOFOL 200 MG: 10 INJECTION, EMULSION INTRAVENOUS at 12:08

## 2025-08-24 LAB
ABSOLUTE EOSINOPHIL (SMH): 0.02 K/UL
ABSOLUTE MONOCYTE (SMH): 0.54 K/UL (ref 0.3–1)
ABSOLUTE NEUTROPHIL COUNT (SMH): 2.6 K/UL (ref 1.8–7.7)
ALBUMIN SERPL-MCNC: 4 G/DL (ref 3.5–5.2)
ALP SERPL-CCNC: 156 UNIT/L (ref 55–135)
ALT SERPL-CCNC: 366 UNIT/L (ref 10–44)
ANION GAP (SMH): 6 MMOL/L (ref 8–16)
AST SERPL-CCNC: 116 UNIT/L (ref 10–40)
BASOPHILS # BLD AUTO: 0.01 K/UL
BASOPHILS NFR BLD AUTO: 0.2 %
BILIRUB SERPL-MCNC: 1.4 MG/DL (ref 0.1–1)
BUN SERPL-MCNC: 7 MG/DL (ref 6–20)
CALCIUM SERPL-MCNC: 9.3 MG/DL (ref 8.7–10.5)
CHLORIDE SERPL-SCNC: 105 MMOL/L (ref 95–110)
CO2 SERPL-SCNC: 27 MMOL/L (ref 23–29)
CREAT SERPL-MCNC: 0.8 MG/DL (ref 0.5–1.4)
ERYTHROCYTE [DISTWIDTH] IN BLOOD BY AUTOMATED COUNT: 12.8 % (ref 11.5–14.5)
GFR SERPLBLD CREATININE-BSD FMLA CKD-EPI: >60 ML/MIN/1.73/M2
GLUCOSE SERPL-MCNC: 89 MG/DL (ref 70–110)
HCT VFR BLD AUTO: 34.9 % (ref 37–48.5)
HGB BLD-MCNC: 11.7 GM/DL (ref 12–16)
IMM GRANULOCYTES # BLD AUTO: 0.01 K/UL (ref 0–0.04)
IMM GRANULOCYTES NFR BLD AUTO: 0.2 % (ref 0–0.5)
LYMPHOCYTES # BLD AUTO: 1.32 K/UL (ref 1–4.8)
MAGNESIUM SERPL-MCNC: 1.8 MG/DL (ref 1.6–2.6)
MCH RBC QN AUTO: 31.3 PG (ref 27–31)
MCHC RBC AUTO-ENTMCNC: 33.5 G/DL (ref 32–36)
MCV RBC AUTO: 93 FL (ref 82–98)
NUCLEATED RBC (/100WBC) (SMH): 0 /100 WBC
PLATELET # BLD AUTO: 192 K/UL (ref 150–450)
PMV BLD AUTO: 9.8 FL (ref 9.2–12.9)
POTASSIUM SERPL-SCNC: 3.9 MMOL/L (ref 3.5–5.1)
PROT SERPL-MCNC: 6.6 GM/DL (ref 6–8.4)
RBC # BLD AUTO: 3.74 M/UL (ref 4–5.4)
RELATIVE EOSINOPHIL (SMH): 0.4 % (ref 0–8)
RELATIVE LYMPHOCYTE (SMH): 29.3 % (ref 18–48)
RELATIVE MONOCYTE (SMH): 12 % (ref 4–15)
RELATIVE NEUTROPHIL (SMH): 57.9 % (ref 38–73)
SODIUM SERPL-SCNC: 138 MMOL/L (ref 136–145)
WBC # BLD AUTO: 4.51 K/UL (ref 3.9–12.7)

## 2025-08-24 PROCEDURE — 25000003 PHARM REV CODE 250: Performed by: SURGERY

## 2025-08-24 PROCEDURE — 85025 COMPLETE CBC W/AUTO DIFF WBC: CPT | Performed by: INTERNAL MEDICINE

## 2025-08-24 PROCEDURE — 63600175 PHARM REV CODE 636 W HCPCS: Mod: JZ | Performed by: SURGERY

## 2025-08-24 PROCEDURE — 63600175 PHARM REV CODE 636 W HCPCS: Performed by: SURGERY

## 2025-08-24 PROCEDURE — 25000003 PHARM REV CODE 250: Performed by: INTERNAL MEDICINE

## 2025-08-24 PROCEDURE — 11000001 HC ACUTE MED/SURG PRIVATE ROOM

## 2025-08-24 PROCEDURE — 94799 UNLISTED PULMONARY SVC/PX: CPT

## 2025-08-24 PROCEDURE — 25000003 PHARM REV CODE 250: Performed by: STUDENT IN AN ORGANIZED HEALTH CARE EDUCATION/TRAINING PROGRAM

## 2025-08-24 PROCEDURE — 99900035 HC TECH TIME PER 15 MIN (STAT)

## 2025-08-24 PROCEDURE — 94761 N-INVAS EAR/PLS OXIMETRY MLT: CPT

## 2025-08-24 PROCEDURE — 36415 COLL VENOUS BLD VENIPUNCTURE: CPT | Performed by: INTERNAL MEDICINE

## 2025-08-24 PROCEDURE — 99900031 HC PATIENT EDUCATION (STAT)

## 2025-08-24 PROCEDURE — 63600175 PHARM REV CODE 636 W HCPCS: Performed by: INTERNAL MEDICINE

## 2025-08-24 PROCEDURE — 83735 ASSAY OF MAGNESIUM: CPT | Performed by: INTERNAL MEDICINE

## 2025-08-24 PROCEDURE — 94640 AIRWAY INHALATION TREATMENT: CPT

## 2025-08-24 PROCEDURE — 80053 COMPREHEN METABOLIC PANEL: CPT | Performed by: INTERNAL MEDICINE

## 2025-08-24 PROCEDURE — 25000242 PHARM REV CODE 250 ALT 637 W/ HCPCS: Performed by: INTERNAL MEDICINE

## 2025-08-24 RX ORDER — CALCIUM CARBONATE 200(500)MG
500 TABLET,CHEWABLE ORAL DAILY PRN
Status: DISCONTINUED | OUTPATIENT
Start: 2025-08-24 | End: 2025-08-25 | Stop reason: HOSPADM

## 2025-08-24 RX ORDER — KETOROLAC TROMETHAMINE 30 MG/ML
15 INJECTION, SOLUTION INTRAMUSCULAR; INTRAVENOUS ONCE
Status: COMPLETED | OUTPATIENT
Start: 2025-08-24 | End: 2025-08-24

## 2025-08-24 RX ORDER — KETOROLAC TROMETHAMINE 10 MG/1
10 TABLET, FILM COATED ORAL EVERY 8 HOURS
Status: DISCONTINUED | OUTPATIENT
Start: 2025-08-24 | End: 2025-08-25 | Stop reason: HOSPADM

## 2025-08-24 RX ORDER — ALBUTEROL SULFATE 0.83 MG/ML
2.5 SOLUTION RESPIRATORY (INHALATION) EVERY 6 HOURS
Status: DISCONTINUED | OUTPATIENT
Start: 2025-08-24 | End: 2025-08-25 | Stop reason: HOSPADM

## 2025-08-24 RX ORDER — ALBUTEROL SULFATE 0.83 MG/ML
2.5 SOLUTION RESPIRATORY (INHALATION) EVERY 4 HOURS PRN
Status: DISCONTINUED | OUTPATIENT
Start: 2025-08-24 | End: 2025-08-24

## 2025-08-24 RX ORDER — OXYCODONE AND ACETAMINOPHEN 10; 325 MG/1; MG/1
1 TABLET ORAL EVERY 4 HOURS PRN
Refills: 0 | Status: DISCONTINUED | OUTPATIENT
Start: 2025-08-24 | End: 2025-08-25 | Stop reason: HOSPADM

## 2025-08-24 RX ADMIN — UBROGEPANT 100 MG: 100 TABLET ORAL at 05:08

## 2025-08-24 RX ADMIN — ALBUTEROL SULFATE 2.5 MG: 2.5 SOLUTION RESPIRATORY (INHALATION) at 10:08

## 2025-08-24 RX ADMIN — MORPHINE SULFATE 2 MG: 2 INJECTION, SOLUTION INTRAMUSCULAR; INTRAVENOUS at 10:08

## 2025-08-24 RX ADMIN — MORPHINE SULFATE 2 MG: 2 INJECTION, SOLUTION INTRAMUSCULAR; INTRAVENOUS at 01:08

## 2025-08-24 RX ADMIN — OXYCODONE HYDROCHLORIDE 5 MG: 5 TABLET ORAL at 08:08

## 2025-08-24 RX ADMIN — Medication 800 MG: at 10:08

## 2025-08-24 RX ADMIN — ALBUTEROL SULFATE 2.5 MG: 2.5 SOLUTION RESPIRATORY (INHALATION) at 04:08

## 2025-08-24 RX ADMIN — Medication 800 MG: at 02:08

## 2025-08-24 RX ADMIN — ALBUTEROL SULFATE 2.5 MG: 2.5 SOLUTION RESPIRATORY (INHALATION) at 07:08

## 2025-08-24 RX ADMIN — KETOROLAC TROMETHAMINE 15 MG: 30 INJECTION, SOLUTION INTRAMUSCULAR; INTRAVENOUS at 12:08

## 2025-08-24 RX ADMIN — KETOROLAC TROMETHAMINE 10 MG: 10 TABLET, FILM COATED ORAL at 07:08

## 2025-08-24 RX ADMIN — DOCUSATE SODIUM 100 MG: 100 CAPSULE, LIQUID FILLED ORAL at 08:08

## 2025-08-25 ENCOUNTER — TELEPHONE (OUTPATIENT)
Dept: FAMILY MEDICINE | Facility: CLINIC | Age: 30
End: 2025-08-25
Payer: COMMERCIAL

## 2025-08-25 VITALS
WEIGHT: 169.75 LBS | HEART RATE: 86 BPM | DIASTOLIC BLOOD PRESSURE: 58 MMHG | SYSTOLIC BLOOD PRESSURE: 98 MMHG | RESPIRATION RATE: 18 BRPM | TEMPERATURE: 98 F | OXYGEN SATURATION: 97 % | HEIGHT: 70 IN | BODY MASS INDEX: 24.3 KG/M2

## 2025-08-25 LAB
ABSOLUTE EOSINOPHIL (SMH): 0.03 K/UL
ABSOLUTE MONOCYTE (SMH): 0.3 K/UL (ref 0.3–1)
ABSOLUTE NEUTROPHIL COUNT (SMH): 1 K/UL (ref 1.8–7.7)
ALBUMIN SERPL-MCNC: 3.8 G/DL (ref 3.5–5.2)
ALP SERPL-CCNC: 148 UNIT/L (ref 55–135)
ALT SERPL-CCNC: 344 UNIT/L (ref 10–44)
ANION GAP (SMH): 7 MMOL/L (ref 8–16)
AST SERPL-CCNC: 144 UNIT/L (ref 10–40)
BASOPHILS # BLD AUTO: 0.02 K/UL
BASOPHILS NFR BLD AUTO: 0.7 %
BILIRUB SERPL-MCNC: 1 MG/DL (ref 0.1–1)
BUN SERPL-MCNC: 6 MG/DL (ref 6–20)
CALCIUM SERPL-MCNC: 9.4 MG/DL (ref 8.7–10.5)
CHLORIDE SERPL-SCNC: 107 MMOL/L (ref 95–110)
CO2 SERPL-SCNC: 27 MMOL/L (ref 23–29)
CREAT SERPL-MCNC: 0.9 MG/DL (ref 0.5–1.4)
ERYTHROCYTE [DISTWIDTH] IN BLOOD BY AUTOMATED COUNT: 13.1 % (ref 11.5–14.5)
GFR SERPLBLD CREATININE-BSD FMLA CKD-EPI: >60 ML/MIN/1.73/M2
GLUCOSE SERPL-MCNC: 104 MG/DL (ref 70–110)
HCT VFR BLD AUTO: 33.2 % (ref 37–48.5)
HGB BLD-MCNC: 11 GM/DL (ref 12–16)
IMM GRANULOCYTES # BLD AUTO: 0 K/UL (ref 0–0.04)
IMM GRANULOCYTES NFR BLD AUTO: 0 % (ref 0–0.5)
LYMPHOCYTES # BLD AUTO: 1.66 K/UL (ref 1–4.8)
MAGNESIUM SERPL-MCNC: 1.9 MG/DL (ref 1.6–2.6)
MCH RBC QN AUTO: 31.6 PG (ref 27–31)
MCHC RBC AUTO-ENTMCNC: 33.1 G/DL (ref 32–36)
MCV RBC AUTO: 95 FL (ref 82–98)
NUCLEATED RBC (/100WBC) (SMH): 0 /100 WBC
PLATELET # BLD AUTO: 146 K/UL (ref 150–450)
PMV BLD AUTO: 9.1 FL (ref 9.2–12.9)
POTASSIUM SERPL-SCNC: 3.3 MMOL/L (ref 3.5–5.1)
PROT SERPL-MCNC: 6 GM/DL (ref 6–8.4)
RBC # BLD AUTO: 3.48 M/UL (ref 4–5.4)
RELATIVE EOSINOPHIL (SMH): 1 % (ref 0–8)
RELATIVE LYMPHOCYTE (SMH): 55 % (ref 18–48)
RELATIVE MONOCYTE (SMH): 9.9 % (ref 4–15)
RELATIVE NEUTROPHIL (SMH): 33.4 % (ref 38–73)
SODIUM SERPL-SCNC: 141 MMOL/L (ref 136–145)
WBC # BLD AUTO: 3.02 K/UL (ref 3.9–12.7)

## 2025-08-25 PROCEDURE — 82565 ASSAY OF CREATININE: CPT | Performed by: SURGERY

## 2025-08-25 PROCEDURE — 83735 ASSAY OF MAGNESIUM: CPT | Performed by: SURGERY

## 2025-08-25 PROCEDURE — 99900031 HC PATIENT EDUCATION (STAT)

## 2025-08-25 PROCEDURE — 94799 UNLISTED PULMONARY SVC/PX: CPT

## 2025-08-25 PROCEDURE — 25000003 PHARM REV CODE 250: Performed by: INTERNAL MEDICINE

## 2025-08-25 PROCEDURE — 63600175 PHARM REV CODE 636 W HCPCS: Performed by: SURGERY

## 2025-08-25 PROCEDURE — 25000242 PHARM REV CODE 250 ALT 637 W/ HCPCS: Performed by: INTERNAL MEDICINE

## 2025-08-25 PROCEDURE — 25000003 PHARM REV CODE 250: Performed by: SURGERY

## 2025-08-25 PROCEDURE — 94761 N-INVAS EAR/PLS OXIMETRY MLT: CPT

## 2025-08-25 PROCEDURE — 36415 COLL VENOUS BLD VENIPUNCTURE: CPT | Performed by: SURGERY

## 2025-08-25 PROCEDURE — 94640 AIRWAY INHALATION TREATMENT: CPT

## 2025-08-25 PROCEDURE — 85025 COMPLETE CBC W/AUTO DIFF WBC: CPT | Performed by: SURGERY

## 2025-08-25 RX ORDER — OXYCODONE AND ACETAMINOPHEN 10; 325 MG/1; MG/1
1 TABLET ORAL EVERY 6 HOURS PRN
Qty: 15 TABLET | Refills: 0 | Status: SHIPPED | OUTPATIENT
Start: 2025-08-25

## 2025-08-25 RX ORDER — SCOPOLAMINE 1 MG/3D
1 PATCH, EXTENDED RELEASE TRANSDERMAL
Qty: 5 PATCH | Refills: 0 | Status: SHIPPED | OUTPATIENT
Start: 2025-08-25

## 2025-08-25 RX ORDER — KETOROLAC TROMETHAMINE 10 MG/1
10 TABLET, FILM COATED ORAL ONCE
Status: COMPLETED | OUTPATIENT
Start: 2025-08-25 | End: 2025-08-25

## 2025-08-25 RX ORDER — KETOROLAC TROMETHAMINE 10 MG/1
10 TABLET, FILM COATED ORAL EVERY 8 HOURS PRN
Qty: 21 TABLET | Refills: 0 | Status: SHIPPED | OUTPATIENT
Start: 2025-08-25

## 2025-08-25 RX ADMIN — ONDANSETRON 4 MG: 2 INJECTION INTRAMUSCULAR; INTRAVENOUS at 02:08

## 2025-08-25 RX ADMIN — ALBUTEROL SULFATE 2.5 MG: 2.5 SOLUTION RESPIRATORY (INHALATION) at 01:08

## 2025-08-25 RX ADMIN — KETOROLAC TROMETHAMINE 10 MG: 10 TABLET, FILM COATED ORAL at 10:08

## 2025-08-25 RX ADMIN — DOCUSATE SODIUM 100 MG: 100 CAPSULE, LIQUID FILLED ORAL at 09:08

## 2025-08-25 RX ADMIN — OXYCODONE HYDROCHLORIDE AND ACETAMINOPHEN 1 TABLET: 10; 325 TABLET ORAL at 02:08

## 2025-08-25 RX ADMIN — POTASSIUM BICARBONATE 35 MEQ: 391 TABLET, EFFERVESCENT ORAL at 09:08

## 2025-08-25 RX ADMIN — ALBUTEROL SULFATE 2.5 MG: 2.5 SOLUTION RESPIRATORY (INHALATION) at 06:08

## 2025-08-26 ENCOUNTER — PATIENT OUTREACH (OUTPATIENT)
Dept: ADMINISTRATIVE | Facility: CLINIC | Age: 30
End: 2025-08-26
Payer: COMMERCIAL

## 2025-08-26 LAB
BACTERIA BLD CULT: NORMAL
BACTERIA BLD CULT: NORMAL

## 2025-08-28 ENCOUNTER — OFFICE VISIT (OUTPATIENT)
Dept: FAMILY MEDICINE | Facility: CLINIC | Age: 30
End: 2025-08-28
Payer: COMMERCIAL

## 2025-08-28 ENCOUNTER — LAB VISIT (OUTPATIENT)
Dept: LAB | Facility: HOSPITAL | Age: 30
End: 2025-08-28
Attending: FAMILY MEDICINE
Payer: COMMERCIAL

## 2025-08-28 ENCOUNTER — HOSPITAL ENCOUNTER (EMERGENCY)
Facility: HOSPITAL | Age: 30
Discharge: HOME | End: 2025-08-28
Attending: STUDENT IN AN ORGANIZED HEALTH CARE EDUCATION/TRAINING PROGRAM
Payer: COMMERCIAL

## 2025-08-28 VITALS
OXYGEN SATURATION: 99 % | HEIGHT: 70 IN | RESPIRATION RATE: 18 BRPM | HEART RATE: 89 BPM | WEIGHT: 152.75 LBS | SYSTOLIC BLOOD PRESSURE: 98 MMHG | BODY MASS INDEX: 21.87 KG/M2 | TEMPERATURE: 98 F | DIASTOLIC BLOOD PRESSURE: 68 MMHG

## 2025-08-28 VITALS
HEART RATE: 59 BPM | SYSTOLIC BLOOD PRESSURE: 117 MMHG | WEIGHT: 152 LBS | TEMPERATURE: 98 F | OXYGEN SATURATION: 98 % | BODY MASS INDEX: 21.76 KG/M2 | RESPIRATION RATE: 18 BRPM | HEIGHT: 70 IN | DIASTOLIC BLOOD PRESSURE: 75 MMHG

## 2025-08-28 DIAGNOSIS — R10.11 RIGHT UPPER QUADRANT ABDOMINAL PAIN: Primary | ICD-10-CM

## 2025-08-28 DIAGNOSIS — R11.0 NAUSEA: ICD-10-CM

## 2025-08-28 DIAGNOSIS — K85.10 GALLSTONE PANCREATITIS: ICD-10-CM

## 2025-08-28 DIAGNOSIS — K85.10 GALLSTONE PANCREATITIS: Primary | ICD-10-CM

## 2025-08-28 DIAGNOSIS — B17.9 ACUTE HEPATITIS: ICD-10-CM

## 2025-08-28 DIAGNOSIS — K59.00 CONSTIPATION, UNSPECIFIED CONSTIPATION TYPE: ICD-10-CM

## 2025-08-28 DIAGNOSIS — Z90.49 STATUS POST LAPAROSCOPIC CHOLECYSTECTOMY: ICD-10-CM

## 2025-08-28 LAB
ABSOLUTE EOSINOPHIL (OHS): 0.03 K/UL
ABSOLUTE EOSINOPHIL (SMH): 0.05 K/UL
ABSOLUTE MONOCYTE (OHS): 0.26 K/UL (ref 0.3–1)
ABSOLUTE MONOCYTE (SMH): 0.3 K/UL (ref 0.3–1)
ABSOLUTE NEUTROPHIL COUNT (OHS): 1.68 K/UL (ref 1.8–7.7)
ABSOLUTE NEUTROPHIL COUNT (SMH): 1.7 K/UL (ref 1.8–7.7)
ALBUMIN SERPL BCP-MCNC: 4.2 G/DL (ref 3.5–5.2)
ALBUMIN SERPL-MCNC: 4.4 G/DL (ref 3.5–5.2)
ALP SERPL-CCNC: 141 UNIT/L (ref 55–135)
ALP SERPL-CCNC: 157 UNIT/L (ref 40–150)
ALT SERPL W/O P-5'-P-CCNC: 398 UNIT/L (ref 0–55)
ALT SERPL-CCNC: 252 UNIT/L (ref 10–44)
ANION GAP (OHS): 8 MMOL/L (ref 8–16)
ANION GAP (SMH): 8 MMOL/L (ref 8–16)
AST SERPL-CCNC: 114 UNIT/L (ref 0–50)
AST SERPL-CCNC: 72 UNIT/L (ref 10–40)
B-HCG UR QL: NEGATIVE
BASOPHILS # BLD AUTO: 0.02 K/UL
BASOPHILS # BLD AUTO: 0.03 K/UL
BASOPHILS NFR BLD AUTO: 0.6 %
BASOPHILS NFR BLD AUTO: 0.9 %
BILIRUB SERPL-MCNC: 0.8 MG/DL (ref 0.1–1)
BILIRUB SERPL-MCNC: 0.9 MG/DL (ref 0.1–1)
BILIRUB UR QL STRIP.AUTO: NEGATIVE
BUN SERPL-MCNC: 11 MG/DL (ref 6–20)
BUN SERPL-MCNC: 12 MG/DL (ref 6–20)
CALCIUM SERPL-MCNC: 9.7 MG/DL (ref 8.7–10.5)
CALCIUM SERPL-MCNC: 9.8 MG/DL (ref 8.7–10.5)
CHLORIDE SERPL-SCNC: 104 MMOL/L (ref 95–110)
CHLORIDE SERPL-SCNC: 105 MMOL/L (ref 95–110)
CLARITY UR: CLEAR
CO2 SERPL-SCNC: 24 MMOL/L (ref 23–29)
CO2 SERPL-SCNC: 26 MMOL/L (ref 23–29)
COLOR UR AUTO: YELLOW
CREAT SERPL-MCNC: 0.9 MG/DL (ref 0.5–1.4)
CREAT SERPL-MCNC: 0.9 MG/DL (ref 0.5–1.4)
CTP QC/QA: YES
ERYTHROCYTE [DISTWIDTH] IN BLOOD BY AUTOMATED COUNT: 12.6 % (ref 11.5–14.5)
ERYTHROCYTE [DISTWIDTH] IN BLOOD BY AUTOMATED COUNT: 12.7 % (ref 11.5–14.5)
GFR SERPLBLD CREATININE-BSD FMLA CKD-EPI: >60 ML/MIN/1.73/M2
GFR SERPLBLD CREATININE-BSD FMLA CKD-EPI: >60 ML/MIN/1.73/M2
GLUCOSE SERPL-MCNC: 104 MG/DL (ref 70–110)
GLUCOSE SERPL-MCNC: 89 MG/DL (ref 70–110)
GLUCOSE UR QL STRIP: NEGATIVE
HCT VFR BLD AUTO: 36 % (ref 37–48.5)
HCT VFR BLD AUTO: 40.2 % (ref 37–48.5)
HGB BLD-MCNC: 12.3 GM/DL (ref 12–16)
HGB BLD-MCNC: 13.5 GM/DL (ref 12–16)
HGB UR QL STRIP: NEGATIVE
IMM GRANULOCYTES # BLD AUTO: 0 K/UL (ref 0–0.04)
IMM GRANULOCYTES # BLD AUTO: 0 K/UL (ref 0–0.04)
IMM GRANULOCYTES NFR BLD AUTO: 0 % (ref 0–0.5)
IMM GRANULOCYTES NFR BLD AUTO: 0 % (ref 0–0.5)
KETONES UR QL STRIP: ABNORMAL
LEUKOCYTE ESTERASE UR QL STRIP: NEGATIVE
LIPASE SERPL-CCNC: 56 U/L (ref 4–60)
LIPASE SERPL-CCNC: 84 U/L (ref 4–60)
LYMPHOCYTES # BLD AUTO: 1.26 K/UL (ref 1–4.8)
LYMPHOCYTES # BLD AUTO: 1.58 K/UL (ref 1–4.8)
MCH RBC QN AUTO: 31.6 PG (ref 27–31)
MCH RBC QN AUTO: 31.8 PG (ref 27–31)
MCHC RBC AUTO-ENTMCNC: 33.6 G/DL (ref 32–36)
MCHC RBC AUTO-ENTMCNC: 34.2 G/DL (ref 32–36)
MCV RBC AUTO: 93 FL (ref 82–98)
MCV RBC AUTO: 95 FL (ref 82–98)
NITRITE UR QL STRIP: NEGATIVE
NUCLEATED RBC (/100WBC) (OHS): 0 /100 WBC
NUCLEATED RBC (/100WBC) (SMH): 0 /100 WBC
PH UR STRIP: 7 [PH]
PLATELET # BLD AUTO: 212 K/UL (ref 150–450)
PLATELET # BLD AUTO: 236 K/UL (ref 150–450)
PLATELET BLD QL SMEAR: NORMAL
PMV BLD AUTO: 9.6 FL (ref 9.2–12.9)
PMV BLD AUTO: 9.9 FL (ref 9.2–12.9)
POTASSIUM SERPL-SCNC: 3.8 MMOL/L (ref 3.5–5.1)
POTASSIUM SERPL-SCNC: 4.7 MMOL/L (ref 3.5–5.1)
PROT SERPL-MCNC: 7.1 GM/DL (ref 6–8.4)
PROT SERPL-MCNC: 7.4 GM/DL (ref 6–8.4)
PROT UR QL STRIP: NEGATIVE
RBC # BLD AUTO: 3.89 M/UL (ref 4–5.4)
RBC # BLD AUTO: 4.25 M/UL (ref 4–5.4)
RELATIVE EOSINOPHIL (OHS): 0.9 %
RELATIVE EOSINOPHIL (SMH): 1.4 % (ref 0–8)
RELATIVE LYMPHOCYTE (OHS): 38.7 % (ref 18–48)
RELATIVE LYMPHOCYTE (SMH): 43.9 % (ref 18–48)
RELATIVE MONOCYTE (OHS): 8 % (ref 4–15)
RELATIVE MONOCYTE (SMH): 8.3 % (ref 4–15)
RELATIVE NEUTROPHIL (OHS): 51.5 % (ref 38–73)
RELATIVE NEUTROPHIL (SMH): 45.8 % (ref 38–73)
SODIUM SERPL-SCNC: 137 MMOL/L (ref 136–145)
SODIUM SERPL-SCNC: 138 MMOL/L (ref 136–145)
SP GR UR STRIP: 1.01
UROBILINOGEN UR STRIP-ACNC: NEGATIVE EU/DL
WBC # BLD AUTO: 3.26 K/UL (ref 3.9–12.7)
WBC # BLD AUTO: 3.6 K/UL (ref 3.9–12.7)

## 2025-08-28 PROCEDURE — 63600175 PHARM REV CODE 636 W HCPCS: Mod: JZ,TB

## 2025-08-28 PROCEDURE — 99285 EMERGENCY DEPT VISIT HI MDM: CPT | Mod: 25

## 2025-08-28 PROCEDURE — 85025 COMPLETE CBC W/AUTO DIFF WBC: CPT

## 2025-08-28 PROCEDURE — 99999 PR PBB SHADOW E&M-EST. PATIENT-LVL IV: CPT | Mod: PBBFAC,,, | Performed by: FAMILY MEDICINE

## 2025-08-28 PROCEDURE — 83690 ASSAY OF LIPASE: CPT | Performed by: STUDENT IN AN ORGANIZED HEALTH CARE EDUCATION/TRAINING PROGRAM

## 2025-08-28 PROCEDURE — 83690 ASSAY OF LIPASE: CPT

## 2025-08-28 PROCEDURE — 36415 COLL VENOUS BLD VENIPUNCTURE: CPT | Mod: PO

## 2025-08-28 PROCEDURE — 96361 HYDRATE IV INFUSION ADD-ON: CPT

## 2025-08-28 PROCEDURE — 80053 COMPREHEN METABOLIC PANEL: CPT

## 2025-08-28 PROCEDURE — 63600175 PHARM REV CODE 636 W HCPCS: Performed by: STUDENT IN AN ORGANIZED HEALTH CARE EDUCATION/TRAINING PROGRAM

## 2025-08-28 PROCEDURE — 81003 URINALYSIS AUTO W/O SCOPE: CPT | Performed by: STUDENT IN AN ORGANIZED HEALTH CARE EDUCATION/TRAINING PROGRAM

## 2025-08-28 PROCEDURE — 85025 COMPLETE CBC W/AUTO DIFF WBC: CPT | Performed by: STUDENT IN AN ORGANIZED HEALTH CARE EDUCATION/TRAINING PROGRAM

## 2025-08-28 PROCEDURE — 25000003 PHARM REV CODE 250

## 2025-08-28 PROCEDURE — 96375 TX/PRO/DX INJ NEW DRUG ADDON: CPT

## 2025-08-28 PROCEDURE — 96374 THER/PROPH/DIAG INJ IV PUSH: CPT

## 2025-08-28 PROCEDURE — 81025 URINE PREGNANCY TEST: CPT | Performed by: STUDENT IN AN ORGANIZED HEALTH CARE EDUCATION/TRAINING PROGRAM

## 2025-08-28 PROCEDURE — 25500020 PHARM REV CODE 255: Performed by: STUDENT IN AN ORGANIZED HEALTH CARE EDUCATION/TRAINING PROGRAM

## 2025-08-28 PROCEDURE — 80053 COMPREHEN METABOLIC PANEL: CPT | Performed by: STUDENT IN AN ORGANIZED HEALTH CARE EDUCATION/TRAINING PROGRAM

## 2025-08-28 RX ORDER — KETOROLAC TROMETHAMINE 30 MG/ML
15 INJECTION, SOLUTION INTRAMUSCULAR; INTRAVENOUS
Status: COMPLETED | OUTPATIENT
Start: 2025-08-28 | End: 2025-08-28

## 2025-08-28 RX ORDER — MORPHINE SULFATE 4 MG/ML
4 INJECTION, SOLUTION INTRAMUSCULAR; INTRAVENOUS
Refills: 0 | Status: COMPLETED | OUTPATIENT
Start: 2025-08-28 | End: 2025-08-28

## 2025-08-28 RX ORDER — ONDANSETRON HYDROCHLORIDE 2 MG/ML
4 INJECTION, SOLUTION INTRAVENOUS
Status: COMPLETED | OUTPATIENT
Start: 2025-08-28 | End: 2025-08-28

## 2025-08-28 RX ADMIN — MORPHINE SULFATE 4 MG: 4 INJECTION, SOLUTION INTRAMUSCULAR; INTRAVENOUS at 08:08

## 2025-08-28 RX ADMIN — SODIUM CHLORIDE 1000 ML: 0.9 INJECTION, SOLUTION INTRAVENOUS at 09:08

## 2025-08-28 RX ADMIN — SODIUM CHLORIDE 1000 ML: 9 INJECTION, SOLUTION INTRAVENOUS at 08:08

## 2025-08-28 RX ADMIN — KETOROLAC TROMETHAMINE 15 MG: 30 INJECTION, SOLUTION INTRAMUSCULAR; INTRAVENOUS at 08:08

## 2025-08-28 RX ADMIN — IOHEXOL 100 ML: 350 INJECTION, SOLUTION INTRAVENOUS at 08:08

## 2025-08-28 RX ADMIN — ONDANSETRON 4 MG: 2 INJECTION INTRAMUSCULAR; INTRAVENOUS at 09:08

## 2025-08-29 ENCOUNTER — TELEPHONE (OUTPATIENT)
Dept: FAMILY MEDICINE | Facility: CLINIC | Age: 30
End: 2025-08-29
Payer: COMMERCIAL

## 2025-08-29 ENCOUNTER — NURSE TRIAGE (OUTPATIENT)
Dept: ADMINISTRATIVE | Facility: CLINIC | Age: 30
End: 2025-08-29
Payer: COMMERCIAL

## 2025-08-30 ENCOUNTER — PATIENT MESSAGE (OUTPATIENT)
Dept: SURGERY | Facility: CLINIC | Age: 30
End: 2025-08-30
Payer: COMMERCIAL

## (undated) DEVICE — GLOVE SENSICARE PI SURG 7

## (undated) DEVICE — TROCAR ENDOPATH XCEL 5X100MM

## (undated) DEVICE — TROCAR KII FIOS ZTHREAD 12X100

## (undated) DEVICE — SET TUBE PNEUMOCLEAR SE HI FLO

## (undated) DEVICE — CANNULA ENDOPATH XCEL 5X100MM

## (undated) DEVICE — SUT MCRYL PLUS 4-0 PS2 27IN

## (undated) DEVICE — KIT PROCEDURE STER INLET CLOSU

## (undated) DEVICE — SCISSOR 5MMX35CM DIRECT DRIVE

## (undated) DEVICE — ELECTRODE MEGADYNE RETURN DUAL

## (undated) DEVICE — DISSECTOR KITTNER 5MM T 45CM S

## (undated) DEVICE — SYS SEE SHARP SCP ANTIFG LNG

## (undated) DEVICE — SUT VICRYL+ 27 UR-6 VIOL

## (undated) DEVICE — ELECTRODE L HOOK 13IN 33M

## (undated) DEVICE — DISSECTOR CURVED 5DCD

## (undated) DEVICE — Device

## (undated) DEVICE — NDL PNEUMOPERITONEUM 150MM

## (undated) DEVICE — CORD MPLR STR PLUG DISP 10FT

## (undated) DEVICE — ADHESIVE DERMABOND ADVANCED

## (undated) DEVICE — COVER LIGHT HANDLE 80/CA

## (undated) DEVICE — APPLIER CLIP ENDO LIGAMAX 5MM

## (undated) DEVICE — COVER CAMERA OPERATING ROOM

## (undated) DEVICE — BAG TISS RETRV MONARCH 10MM

## (undated) DEVICE — SOL NACL IRR 1000ML BTL